# Patient Record
Sex: FEMALE | Race: WHITE | NOT HISPANIC OR LATINO | Employment: FULL TIME | URBAN - METROPOLITAN AREA
[De-identification: names, ages, dates, MRNs, and addresses within clinical notes are randomized per-mention and may not be internally consistent; named-entity substitution may affect disease eponyms.]

---

## 2017-02-19 ENCOUNTER — PATIENT MESSAGE (OUTPATIENT)
Dept: FAMILY MEDICINE | Facility: CLINIC | Age: 57
End: 2017-02-19

## 2017-02-20 RX ORDER — BUTALBITAL, ACETAMINOPHEN AND CAFFEINE 50; 325; 40 MG/1; MG/1; MG/1
1 TABLET ORAL EVERY 4 HOURS PRN
Qty: 30 TABLET | Refills: 3 | Status: SHIPPED | OUTPATIENT
Start: 2017-02-20 | End: 2017-03-22

## 2017-06-28 ENCOUNTER — TELEPHONE (OUTPATIENT)
Dept: FAMILY MEDICINE | Facility: CLINIC | Age: 57
End: 2017-06-28

## 2017-06-28 DIAGNOSIS — R30.0 DYSURIA: Primary | ICD-10-CM

## 2017-06-28 RX ORDER — CIPROFLOXACIN 250 MG/1
250 TABLET, FILM COATED ORAL 2 TIMES DAILY
Qty: 10 TABLET | Refills: 0 | Status: SHIPPED | OUTPATIENT
Start: 2017-06-28 | End: 2017-09-08

## 2017-06-28 RX ORDER — CIPROFLOXACIN 250 MG/1
250 TABLET, FILM COATED ORAL 2 TIMES DAILY
Qty: 10 TABLET | Refills: 0 | Status: SHIPPED | OUTPATIENT
Start: 2017-06-28 | End: 2017-06-28 | Stop reason: SDUPTHER

## 2017-06-29 ENCOUNTER — LAB VISIT (OUTPATIENT)
Dept: LAB | Facility: HOSPITAL | Age: 57
End: 2017-06-29
Attending: FAMILY MEDICINE
Payer: COMMERCIAL

## 2017-06-29 DIAGNOSIS — R30.0 DYSURIA: ICD-10-CM

## 2017-06-29 LAB
BACTERIA #/AREA URNS HPF: ABNORMAL /HPF
BILIRUB UR QL STRIP: NEGATIVE
CLARITY UR: ABNORMAL
COLOR UR: YELLOW
GLUCOSE UR QL STRIP: NEGATIVE
HGB UR QL STRIP: ABNORMAL
KETONES UR QL STRIP: NEGATIVE
LEUKOCYTE ESTERASE UR QL STRIP: ABNORMAL
MICROSCOPIC COMMENT: ABNORMAL
NITRITE UR QL STRIP: NEGATIVE
PH UR STRIP: 7 [PH] (ref 5–8)
PROT UR QL STRIP: NEGATIVE
RBC #/AREA URNS HPF: 0 /HPF (ref 0–4)
SP GR UR STRIP: <=1.005 (ref 1–1.03)
SQUAMOUS #/AREA URNS HPF: 3 /HPF
URN SPEC COLLECT METH UR: ABNORMAL
UROBILINOGEN UR STRIP-ACNC: NEGATIVE EU/DL
WBC #/AREA URNS HPF: 39 /HPF (ref 0–5)
WBC CLUMPS URNS QL MICRO: ABNORMAL

## 2017-06-29 PROCEDURE — 81000 URINALYSIS NONAUTO W/SCOPE: CPT

## 2017-06-29 PROCEDURE — 87086 URINE CULTURE/COLONY COUNT: CPT

## 2017-06-30 ENCOUNTER — PATIENT MESSAGE (OUTPATIENT)
Dept: FAMILY MEDICINE | Facility: CLINIC | Age: 57
End: 2017-06-30

## 2017-06-30 LAB
BACTERIA UR CULT: NORMAL
BACTERIA UR CULT: NORMAL

## 2017-07-24 ENCOUNTER — PATIENT MESSAGE (OUTPATIENT)
Dept: FAMILY MEDICINE | Facility: CLINIC | Age: 57
End: 2017-07-24

## 2017-07-24 DIAGNOSIS — Z12.31 ENCOUNTER FOR SCREENING MAMMOGRAM FOR BREAST CANCER: Primary | ICD-10-CM

## 2017-08-03 ENCOUNTER — HOSPITAL ENCOUNTER (OUTPATIENT)
Dept: RADIOLOGY | Facility: HOSPITAL | Age: 57
Discharge: HOME | End: 2017-08-03
Attending: FAMILY MEDICINE
Payer: COMMERCIAL

## 2017-08-03 VITALS — BODY MASS INDEX: 32.57 KG/M2 | HEIGHT: 62 IN | WEIGHT: 177 LBS

## 2017-08-03 DIAGNOSIS — Z12.31 ENCOUNTER FOR SCREENING MAMMOGRAM FOR BREAST CANCER: ICD-10-CM

## 2017-08-03 PROCEDURE — 77067 SCR MAMMO BI INCL CAD: CPT | Mod: TC

## 2017-08-03 PROCEDURE — 77067 SCR MAMMO BI INCL CAD: CPT | Mod: 26,,, | Performed by: RADIOLOGY

## 2017-08-03 PROCEDURE — 77063 BREAST TOMOSYNTHESIS BI: CPT | Mod: 26,,, | Performed by: RADIOLOGY

## 2017-09-07 ENCOUNTER — TELEPHONE (OUTPATIENT)
Dept: FAMILY MEDICINE | Facility: CLINIC | Age: 57
End: 2017-09-07

## 2017-09-07 NOTE — TELEPHONE ENCOUNTER
----- Message from Lourdes Saul sent at 9/7/2017  3:43 PM CDT -----  Contact: Self/ [phone]  Patient is returning your call.  Please advise.

## 2017-09-07 NOTE — TELEPHONE ENCOUNTER
----- Message from Allison Almonte sent at 9/7/2017  3:28 PM CDT -----  Contact: [phone]/self  Pt requesting to speak with the nurse about scheduling a PHYSICAL before the next available appointment.  Please call and advise

## 2017-09-08 ENCOUNTER — OFFICE VISIT (OUTPATIENT)
Dept: INTERNAL MEDICINE | Facility: CLINIC | Age: 57
End: 2017-09-08
Payer: COMMERCIAL

## 2017-09-08 VITALS
HEIGHT: 62 IN | DIASTOLIC BLOOD PRESSURE: 62 MMHG | BODY MASS INDEX: 31.16 KG/M2 | RESPIRATION RATE: 16 BRPM | HEART RATE: 99 BPM | WEIGHT: 169.31 LBS | TEMPERATURE: 99 F | SYSTOLIC BLOOD PRESSURE: 97 MMHG

## 2017-09-08 DIAGNOSIS — R35.0 URINARY FREQUENCY: ICD-10-CM

## 2017-09-08 DIAGNOSIS — Z11.59 NEED FOR HEPATITIS C SCREENING TEST: ICD-10-CM

## 2017-09-08 DIAGNOSIS — E78.5 HYPERLIPIDEMIA, UNSPECIFIED HYPERLIPIDEMIA TYPE: ICD-10-CM

## 2017-09-08 DIAGNOSIS — E66.9 OBESITY (BMI 30.0-34.9): ICD-10-CM

## 2017-09-08 DIAGNOSIS — I10 BENIGN ESSENTIAL HYPERTENSION: ICD-10-CM

## 2017-09-08 DIAGNOSIS — G43.109 MIGRAINE WITH AURA AND WITHOUT STATUS MIGRAINOSUS, NOT INTRACTABLE: ICD-10-CM

## 2017-09-08 DIAGNOSIS — Z00.00 ANNUAL PHYSICAL EXAM: Primary | ICD-10-CM

## 2017-09-08 PROCEDURE — 99396 PREV VISIT EST AGE 40-64: CPT | Mod: S$GLB,,, | Performed by: INTERNAL MEDICINE

## 2017-09-08 PROCEDURE — 99999 PR PBB SHADOW E&M-EST. PATIENT-LVL III: CPT | Mod: PBBFAC,,, | Performed by: INTERNAL MEDICINE

## 2017-09-08 NOTE — PROGRESS NOTES
Subjective:      Esther Adams is a 57 y.o. female who presents for annual exam.    Family History:  family history includes Breast cancer in her mother; Glaucoma in her mother; Heart disease in her mother; Hypertension in her mother; Migraines in her father and sister; Ovarian cancer (age of onset: 61) in her other; Ovarian cancer (age of onset: 65) in her maternal grandmother; Pancreatitis in her father; Schizophrenia in her father; Stroke in her father and mother; Thyroid disease in her mother.    Health Maintenance:  Health Maintenance       Date Due Completion Date    Hepatitis C Screening 1960 ---    Influenza Vaccine 08/01/2017 10/14/2015 (Done)    Override on 10/14/2015: Done (at work)    Mammogram 08/03/2019 8/3/2017    Colonoscopy 07/30/2020 7/30/2010    Lipid Panel 09/08/2021 9/8/2016    TETANUS VACCINE 09/07/2026 9/7/2016        Eye exam: glasses, exam due  Dental Exam: up to date     Diet: minimal fast food, eats more salads  Body mass index is 30.97 kg/m².      Meds:   Current Outpatient Prescriptions:     amitriptyline (ELAVIL) 25 MG tablet, TAKE THREE TO FOUR TABLETS BY MOUTH ONCE DAILY IN THE EVENING, Disp: 180 tablet, Rfl: 4    aspirin (ECOTRIN) 81 MG EC tablet, Take 1 tablet (81 mg total) by mouth once daily. 1 Tablet, Delayed Release (E.C.) Oral Every day, Disp: 90 tablet, Rfl: 0    butalbital-acetaminophen-caffeine -40 mg (FIORICET, ESGIC) -40 mg per tablet, TAKE ONE TABLET BY MOUTH EVERY 4 HOURS AS NEEDED FOR HEADACHE, Disp: 60 tablet, Rfl: 2    estradiol (ESTRACE) 0.01 % (0.1 mg/gram) vaginal cream, Place 1 g vaginally twice a week. Use cream nightly for very 1st 2 weeks, Disp: 8 g, Rfl: 11    hydrochlorothiazide (HYDRODIURIL) 25 MG tablet, Take 1 tablet (25 mg total) by mouth once daily., Disp: 90 tablet, Rfl: 3    lisinopril (PRINIVIL,ZESTRIL) 20 MG tablet, Take 1 tablet (20 mg total) by mouth once daily., Disp: 90 tablet, Rfl: 3    potassium chloride (KLOR-CON)  10 MEQ TbSR, Take 1 tablet (10 mEq total) by mouth once daily. 1 Tablet Sustained Release Oral Every day, Disp: 90 tablet, Rfl: 3    simvastatin (ZOCOR) 20 MG tablet, Take 1 tablet (20 mg total) by mouth once daily., Disp: 90 tablet, Rfl: 3    PMHx:   Past Medical History:   Diagnosis Date    Allergy     Benign essential hypertension 2012    Hyperlipidemia 2016    Migraine syndrome        PSHx:  Past Surgical History:   Procedure Laterality Date    BILATERAL OOPHORECTOMY       SECTION      HYSTERECTOMY      endometriosis    OOPHORECTOMY         SocHx:   Social History     Social History    Marital status:      Spouse name: N/A    Number of children: N/A    Years of education: N/A     Social History Main Topics    Smoking status: Never Smoker    Smokeless tobacco: None    Alcohol use No      Comment: couple of glasses per week    Drug use: No    Sexual activity: Yes     Partners: Male      Comment:      Other Topics Concern    None     Social History Narrative    None       Review of Systems   Constitutional: Negative for activity change, chills, fatigue, fever and unexpected weight change.   HENT: Positive for postnasal drip. Negative for congestion, ear discharge, ear pain, hearing loss, mouth sores, rhinorrhea, sinus pressure and trouble swallowing.    Eyes: Negative for discharge and visual disturbance.   Respiratory: Negative for cough, chest tightness, shortness of breath and wheezing.    Cardiovascular: Negative for chest pain, palpitations and leg swelling.   Gastrointestinal: Negative for abdominal pain, constipation, diarrhea, nausea and vomiting.   Endocrine: Positive for polyuria. Negative for polydipsia and polyphagia.   Genitourinary: Positive for frequency. Negative for difficulty urinating, dysuria, flank pain and hematuria.        Change in smell of urine, reports dyspareunia   Musculoskeletal: Negative for arthralgias, joint swelling, myalgias and  neck pain.   Skin: Negative for rash.        Small mole below left eye   Neurological: Positive for headaches. Negative for dizziness, weakness, light-headedness and numbness.        Migraine headaches, with aura- yawning, uses ibuprofen used to take midrin, last migraine 6 months ago, light and sound sensitivity and nausea   Hematological: Negative for adenopathy.   Psychiatric/Behavioral: Negative for confusion and dysphoric mood. The patient is not nervous/anxious.         Grief and crying spells when thinking about her mother who  almost 1 year ago       Objective:      Physical Exam   Constitutional: She is oriented to person, place, and time. Vital signs are normal. She appears well-developed and well-nourished. No distress.   HENT:   Head: Normocephalic and atraumatic.   Right Ear: Hearing, tympanic membrane, external ear and ear canal normal. Tympanic membrane is not erythematous and not bulging.   Left Ear: Hearing, tympanic membrane, external ear and ear canal normal. Tympanic membrane is not erythematous and not bulging.   Nose: Nose normal.   Mouth/Throat: Uvula is midline, oropharynx is clear and moist and mucous membranes are normal. No oropharyngeal exudate or posterior oropharyngeal erythema.   Eyes: Conjunctivae, EOM and lids are normal. Pupils are equal, round, and reactive to light. No scleral icterus.   Neck: Normal range of motion. Neck supple. No thyroid mass and no thyromegaly present.   Cardiovascular: Normal rate, regular rhythm, normal heart sounds and intact distal pulses.    No murmur heard.  Pulmonary/Chest: Effort normal and breath sounds normal. She has no wheezes.   Abdominal: Soft. Bowel sounds are normal. She exhibits no distension. There is no hepatosplenomegaly. There is no tenderness. There is no rigidity, no rebound and no guarding.   Musculoskeletal: Normal range of motion. She exhibits no edema.   Lymphadenopathy:     She has no cervical adenopathy.        Right: No  supraclavicular adenopathy present.        Left: No supraclavicular adenopathy present.   Neurological: She is alert and oriented to person, place, and time. She has normal reflexes. No sensory deficit. Coordination and gait normal.   Skin: Skin is warm, dry and intact. Lesion (fleshy mole left lower eye near lower lash line) noted. No rash noted. She is not diaphoretic.   Psychiatric: She has a normal mood and affect.   Vitals reviewed.      Assessment:       1. Annual physical exam    2. Benign essential hypertension    3. Migraine with aura and without status migrainosus, not intractable    4. Hyperlipidemia, unspecified hyperlipidemia type    5. Obesity (BMI 30.0-34.9)    6. Urinary frequency    7. Need for hepatitis C screening test        Plan:       1,7. Ordered CBC, CMP, TSH, screening hepatitis C, lipid panel and U/A.   2. BP is well-controlled, continue HCTZ and lisinopril  3. Well-controlled with elavil as prophylaxis, uses Fioricet as needed, last HA ~1 month ago  4. Tolerates Zocor, check lipid panel, cmp, continue Zocor  5. Increase physical activity, continue healthy diet, weight has decreased from 177 to 169 lbs since last year.  6. Urinalysis and culture, will treat if positive    RTC for follow-up with PCP    Deisy Polanco MD        Answers for HPI/ROS submitted by the patient on 9/7/2017   activity change: No  unexpected weight change: No  neck pain: No  hearing loss: No  rhinorrhea: No  trouble swallowing: No  eye discharge: No  visual disturbance: No  chest tightness: No  wheezing: No  chest pain: No  palpitations: No  blood in stool: No  constipation: No  vomiting: No  diarrhea: No  polydipsia: No  polyuria: Yes  difficulty urinating: No  hematuria: No  menstrual problem: No  dysuria: No  joint swelling: No  arthralgias: No  headaches: Yes  weakness: No  confusion: No  dysphoric mood: No

## 2017-09-11 ENCOUNTER — LAB VISIT (OUTPATIENT)
Dept: LAB | Facility: HOSPITAL | Age: 57
End: 2017-09-11
Attending: INTERNAL MEDICINE
Payer: COMMERCIAL

## 2017-09-11 DIAGNOSIS — Z00.00 ANNUAL PHYSICAL EXAM: ICD-10-CM

## 2017-09-11 DIAGNOSIS — Z11.59 NEED FOR HEPATITIS C SCREENING TEST: ICD-10-CM

## 2017-09-11 LAB
25(OH)D3+25(OH)D2 SERPL-MCNC: 20 NG/ML
ALBUMIN SERPL BCP-MCNC: 3.9 G/DL
ALP SERPL-CCNC: 85 U/L
ALT SERPL W/O P-5'-P-CCNC: 19 U/L
ANION GAP SERPL CALC-SCNC: 11 MMOL/L
AST SERPL-CCNC: 20 U/L
BASOPHILS # BLD AUTO: 0.05 K/UL
BASOPHILS NFR BLD: 0.5 %
BILIRUB SERPL-MCNC: 0.4 MG/DL
BUN SERPL-MCNC: 13 MG/DL
CALCIUM SERPL-MCNC: 9.2 MG/DL
CHLORIDE SERPL-SCNC: 104 MMOL/L
CHOLEST SERPL-MCNC: 149 MG/DL
CHOLEST/HDLC SERPL: 2.9 {RATIO}
CO2 SERPL-SCNC: 28 MMOL/L
CREAT SERPL-MCNC: 1 MG/DL
DIFFERENTIAL METHOD: ABNORMAL
EOSINOPHIL # BLD AUTO: 0.2 K/UL
EOSINOPHIL NFR BLD: 1.7 %
ERYTHROCYTE [DISTWIDTH] IN BLOOD BY AUTOMATED COUNT: 13.1 %
EST. GFR  (AFRICAN AMERICAN): >60 ML/MIN/1.73 M^2
EST. GFR  (NON AFRICAN AMERICAN): >60 ML/MIN/1.73 M^2
ESTIMATED AVG GLUCOSE: 111 MG/DL
GLUCOSE SERPL-MCNC: 112 MG/DL
HBA1C MFR BLD HPLC: 5.5 %
HCT VFR BLD AUTO: 38.9 %
HCV AB SERPL QL IA: POSITIVE
HDLC SERPL-MCNC: 52 MG/DL
HDLC SERPL: 34.9 %
HGB BLD-MCNC: 12.8 G/DL
LDLC SERPL CALC-MCNC: 78.4 MG/DL
LYMPHOCYTES # BLD AUTO: 3.4 K/UL
LYMPHOCYTES NFR BLD: 34.1 %
MCH RBC QN AUTO: 29.8 PG
MCHC RBC AUTO-ENTMCNC: 32.9 G/DL
MCV RBC AUTO: 91 FL
MONOCYTES # BLD AUTO: 0.4 K/UL
MONOCYTES NFR BLD: 3.8 %
NEUTROPHILS # BLD AUTO: 6 K/UL
NEUTROPHILS NFR BLD: 59.8 %
NONHDLC SERPL-MCNC: 97 MG/DL
PLATELET # BLD AUTO: 316 K/UL
PMV BLD AUTO: 9.6 FL
POTASSIUM SERPL-SCNC: 3.7 MMOL/L
PROT SERPL-MCNC: 7.7 G/DL
RBC # BLD AUTO: 4.3 M/UL
SODIUM SERPL-SCNC: 143 MMOL/L
TRIGL SERPL-MCNC: 93 MG/DL
TSH SERPL DL<=0.005 MIU/L-ACNC: 1.54 UIU/ML
WBC # BLD AUTO: 9.96 K/UL

## 2017-09-11 PROCEDURE — 82306 VITAMIN D 25 HYDROXY: CPT

## 2017-09-11 PROCEDURE — 36415 COLL VENOUS BLD VENIPUNCTURE: CPT

## 2017-09-11 PROCEDURE — 80061 LIPID PANEL: CPT

## 2017-09-11 PROCEDURE — 83036 HEMOGLOBIN GLYCOSYLATED A1C: CPT

## 2017-09-11 PROCEDURE — 86803 HEPATITIS C AB TEST: CPT

## 2017-09-11 PROCEDURE — 80053 COMPREHEN METABOLIC PANEL: CPT

## 2017-09-11 PROCEDURE — 85025 COMPLETE CBC W/AUTO DIFF WBC: CPT

## 2017-09-11 PROCEDURE — 84443 ASSAY THYROID STIM HORMONE: CPT

## 2017-09-22 ENCOUNTER — PATIENT MESSAGE (OUTPATIENT)
Dept: INTERNAL MEDICINE | Facility: CLINIC | Age: 57
End: 2017-09-22

## 2017-09-23 DIAGNOSIS — R76.8 POSITIVE HEPATITIS C ANTIBODY TEST: Primary | ICD-10-CM

## 2017-09-23 PROBLEM — E55.9 VITAMIN D INSUFFICIENCY: Status: ACTIVE | Noted: 2017-09-23

## 2017-10-04 ENCOUNTER — LAB VISIT (OUTPATIENT)
Dept: LAB | Facility: HOSPITAL | Age: 57
End: 2017-10-04
Attending: INTERNAL MEDICINE
Payer: COMMERCIAL

## 2017-10-04 ENCOUNTER — TELEPHONE (OUTPATIENT)
Dept: INTERNAL MEDICINE | Facility: CLINIC | Age: 57
End: 2017-10-04

## 2017-10-04 DIAGNOSIS — R76.8 POSITIVE HEPATITIS C ANTIBODY TEST: ICD-10-CM

## 2017-10-04 PROCEDURE — 36415 COLL VENOUS BLD VENIPUNCTURE: CPT | Mod: PO

## 2017-10-04 PROCEDURE — 87522 HEPATITIS C REVRS TRNSCRPJ: CPT

## 2017-10-04 NOTE — TELEPHONE ENCOUNTER
vm left for patient to return call to discuss if she has or is having any burning, pain with urination, frequency or foul smelling urine. Informed Dr Grossman was reviewing lab results and wanted to follow up on results previously viewed by DR Polanco. Instructed to call at [phone] to discuss.

## 2017-10-04 NOTE — TELEPHONE ENCOUNTER
----- Message from Desirae Johnson MD sent at 10/4/2017 11:27 AM CDT -----  Please call pt and see if she is having any UTI sx  At this time  ( she had bacteria that usually does not cause sx )  May say covering MD reviewing results since Dr. SANDIE mitchell

## 2017-10-05 ENCOUNTER — PATIENT MESSAGE (OUTPATIENT)
Dept: INTERNAL MEDICINE | Facility: CLINIC | Age: 57
End: 2017-10-05

## 2017-10-05 LAB
HCV LOG: <1.08 LOG (10) IU/ML
HCV RNA QUANT PCR: <12 IU/ML
HCV, QUALITATIVE: NOT DETECTED IU/ML

## 2017-10-05 RX ORDER — AMOXICILLIN AND CLAVULANATE POTASSIUM 875; 125 MG/1; MG/1
1 TABLET, FILM COATED ORAL 2 TIMES DAILY
Qty: 14 TABLET | Refills: 0 | Status: SHIPPED | OUTPATIENT
Start: 2017-10-05 | End: 2017-10-12

## 2017-10-05 NOTE — TELEPHONE ENCOUNTER
Labs addressed by Dr Grossman, spoke with patient today, patient reports she is having burning, foul odor with urination and frequency. Would like to discuss treatment as per Dr Grossman's notes. Message forwarded.

## 2017-10-05 NOTE — TELEPHONE ENCOUNTER
Since she is symptomatic, would recommend treating with augmentin. This is a penicillin based abx. What pharmacy does she prefer?

## 2017-10-09 DIAGNOSIS — E78.5 HYPERLIPIDEMIA, UNSPECIFIED HYPERLIPIDEMIA TYPE: ICD-10-CM

## 2017-10-09 RX ORDER — SIMVASTATIN 20 MG/1
TABLET, FILM COATED ORAL
Qty: 90 TABLET | Refills: 5 | Status: SHIPPED | OUTPATIENT
Start: 2017-10-09 | End: 2017-11-14 | Stop reason: SDUPTHER

## 2017-10-12 ENCOUNTER — TELEPHONE (OUTPATIENT)
Dept: INTERNAL MEDICINE | Facility: CLINIC | Age: 57
End: 2017-10-12

## 2017-10-12 DIAGNOSIS — R76.8 HEPATITIS C ANTIBODY TEST POSITIVE: Primary | ICD-10-CM

## 2017-10-15 ENCOUNTER — DOCUMENTATION ONLY (OUTPATIENT)
Dept: TRANSPLANT | Facility: CLINIC | Age: 57
End: 2017-10-15

## 2017-10-15 NOTE — LETTER
October 15, 2017    Esther Adams      Dear Dr. Adams    Patient: Esther Adams   MR Number: [id number]   YOB: 1960     Thank you for the referral of Esther Adams to the [facility] program. An initial appointment will be scheduled for your patient with one of our Hepatologists.      Thank you again for your trust in our program.  If there is anything we can do for you or your staff, please feel free to contact us.        Sincerely,        [facility]  [address] [place] [id number]  [phone]

## 2017-10-15 NOTE — LETTER
October 15, 2017    Esther Adams  [address]  [place] [address]      Dear Esther Adams:    Your doctor has referred you to the [facility]. You will be contacted by our office and an initial appointment will then be scheduled for you.    We look forward to seeing you soon. If you have any further questions, please contact us at [phone].       Sincerely,        [facility]   [address] [place] [id number]  [phone]

## 2017-10-16 NOTE — NURSING
Pt records reviewed.   Pt will be referred to Hepatitis C clinic    Initial referral received  from the workque.   Referring Provider/diagnosis  FARRAH WELCH Provider:   Diagnosis: Hepatitis C antibody test positive         Referral letter sent to provider and patient.

## 2017-11-14 DIAGNOSIS — I10 BENIGN ESSENTIAL HYPERTENSION: ICD-10-CM

## 2017-11-14 DIAGNOSIS — G43.909 MIGRAINE SYNDROME: ICD-10-CM

## 2017-11-14 DIAGNOSIS — E78.5 HYPERLIPIDEMIA, UNSPECIFIED HYPERLIPIDEMIA TYPE: ICD-10-CM

## 2017-11-14 DIAGNOSIS — E87.6 HYPOKALEMIA: ICD-10-CM

## 2017-11-14 RX ORDER — LISINOPRIL 20 MG/1
20 TABLET ORAL DAILY
Qty: 90 TABLET | Refills: 4 | Status: SHIPPED | OUTPATIENT
Start: 2017-11-14 | End: 2018-09-21 | Stop reason: SDUPTHER

## 2017-11-14 RX ORDER — POTASSIUM CHLORIDE 750 MG/1
10 TABLET, EXTENDED RELEASE ORAL DAILY
Qty: 90 TABLET | Refills: 4 | Status: SHIPPED | OUTPATIENT
Start: 2017-11-14 | End: 2018-11-26

## 2017-11-14 RX ORDER — AMITRIPTYLINE HYDROCHLORIDE 25 MG/1
TABLET, FILM COATED ORAL
Qty: 180 TABLET | Refills: 11 | Status: SHIPPED | OUTPATIENT
Start: 2017-11-14 | End: 2018-09-21 | Stop reason: DRUGHIGH

## 2017-11-14 RX ORDER — HYDROCHLOROTHIAZIDE 25 MG/1
25 TABLET ORAL DAILY
Qty: 90 TABLET | Refills: 4 | Status: SHIPPED | OUTPATIENT
Start: 2017-11-14 | End: 2018-09-21 | Stop reason: SDUPTHER

## 2017-11-14 RX ORDER — SIMVASTATIN 20 MG/1
20 TABLET, FILM COATED ORAL DAILY
Qty: 90 TABLET | Refills: 5 | Status: SHIPPED | OUTPATIENT
Start: 2017-12-18 | End: 2018-09-21 | Stop reason: SDUPTHER

## 2017-11-14 NOTE — TELEPHONE ENCOUNTER
Estherfannie Ortizorly would like a refill of the following medications:         potassium chloride (KLOR-CON) 10 MEQ TbSR [Shanta Perez MD]         lisinopril (PRINIVIL,ZESTRIL) 20 MG tablet [Shanta Perez MD]         hydrochlorothiazide (HYDRODIURIL) 25 MG tablet [Shanta Perez MD]         amitriptyline (ELAVIL) 25 MG tablet [Shanta Perez MD]         simvastatin (ZOCOR) 20 MG tablet [Shanta Perez MD]     Preferred pharmacy: [facility] [place] - [address] [facility]

## 2017-12-19 ENCOUNTER — PATIENT MESSAGE (OUTPATIENT)
Dept: FAMILY MEDICINE | Facility: CLINIC | Age: 57
End: 2017-12-19

## 2017-12-19 ENCOUNTER — HOSPITAL ENCOUNTER (EMERGENCY)
Facility: HOSPITAL | Age: 57
Discharge: HOME | End: 2017-12-19
Attending: EMERGENCY MEDICINE
Payer: COMMERCIAL

## 2017-12-19 VITALS
SYSTOLIC BLOOD PRESSURE: 129 MMHG | WEIGHT: 166 LBS | TEMPERATURE: 98 F | RESPIRATION RATE: 16 BRPM | HEIGHT: 62 IN | DIASTOLIC BLOOD PRESSURE: 61 MMHG | HEART RATE: 102 BPM | BODY MASS INDEX: 30.55 KG/M2 | OXYGEN SATURATION: 97 %

## 2017-12-19 DIAGNOSIS — K52.9 COLITIS, ACUTE: Primary | ICD-10-CM

## 2017-12-19 DIAGNOSIS — R10.84 GENERALIZED ABDOMINAL PAIN: ICD-10-CM

## 2017-12-19 DIAGNOSIS — R11.2 NAUSEA, VOMITING, AND DIARRHEA: ICD-10-CM

## 2017-12-19 DIAGNOSIS — R19.7 NAUSEA, VOMITING, AND DIARRHEA: ICD-10-CM

## 2017-12-19 LAB
ABO + RH BLD: NORMAL
ALBUMIN SERPL BCP-MCNC: 4.2 G/DL
ALP SERPL-CCNC: 90 U/L
ALT SERPL W/O P-5'-P-CCNC: 25 U/L
ANION GAP SERPL CALC-SCNC: 12 MMOL/L
APTT BLDCRRT: 24.9 SEC
AST SERPL-CCNC: 21 U/L
BASOPHILS # BLD AUTO: 0.04 K/UL
BASOPHILS NFR BLD: 0.3 %
BILIRUB SERPL-MCNC: 0.4 MG/DL
BLD GP AB SCN CELLS X3 SERPL QL: NORMAL
BUN SERPL-MCNC: 13 MG/DL
CALCIUM SERPL-MCNC: 9.7 MG/DL
CHLORIDE SERPL-SCNC: 102 MMOL/L
CO2 SERPL-SCNC: 24 MMOL/L
CREAT SERPL-MCNC: 1 MG/DL
DIFFERENTIAL METHOD: ABNORMAL
EOSINOPHIL # BLD AUTO: 0 K/UL
EOSINOPHIL NFR BLD: 0.1 %
ERYTHROCYTE [DISTWIDTH] IN BLOOD BY AUTOMATED COUNT: 12.9 %
EST. GFR  (AFRICAN AMERICAN): >60 ML/MIN/1.73 M^2
EST. GFR  (NON AFRICAN AMERICAN): >60 ML/MIN/1.73 M^2
GLUCOSE SERPL-MCNC: 142 MG/DL
HCT VFR BLD AUTO: 41.3 %
HGB BLD-MCNC: 13.8 G/DL
INR PPP: 0.9
LYMPHOCYTES # BLD AUTO: 2 K/UL
LYMPHOCYTES NFR BLD: 13.1 %
MCH RBC QN AUTO: 29.6 PG
MCHC RBC AUTO-ENTMCNC: 33.4 G/DL
MCV RBC AUTO: 88 FL
MONOCYTES # BLD AUTO: 0.7 K/UL
MONOCYTES NFR BLD: 4.8 %
NEUTROPHILS # BLD AUTO: 12.5 K/UL
NEUTROPHILS NFR BLD: 81.6 %
PLATELET # BLD AUTO: 350 K/UL
PMV BLD AUTO: 9.8 FL
POTASSIUM SERPL-SCNC: 3.6 MMOL/L
PROT SERPL-MCNC: 8.1 G/DL
PROTHROMBIN TIME: 9.8 SEC
RBC # BLD AUTO: 4.67 M/UL
SODIUM SERPL-SCNC: 138 MMOL/L
WBC # BLD AUTO: 15.27 K/UL

## 2017-12-19 PROCEDURE — 96375 TX/PRO/DX INJ NEW DRUG ADDON: CPT

## 2017-12-19 PROCEDURE — 99284 EMERGENCY DEPT VISIT MOD MDM: CPT | Mod: 25

## 2017-12-19 PROCEDURE — 96372 THER/PROPH/DIAG INJ SC/IM: CPT

## 2017-12-19 PROCEDURE — 96361 HYDRATE IV INFUSION ADD-ON: CPT

## 2017-12-19 PROCEDURE — 85610 PROTHROMBIN TIME: CPT

## 2017-12-19 PROCEDURE — 80053 COMPREHEN METABOLIC PANEL: CPT

## 2017-12-19 PROCEDURE — 25500020 PHARM REV CODE 255: Performed by: EMERGENCY MEDICINE

## 2017-12-19 PROCEDURE — 63600175 PHARM REV CODE 636 W HCPCS: Performed by: EMERGENCY MEDICINE

## 2017-12-19 PROCEDURE — 85730 THROMBOPLASTIN TIME PARTIAL: CPT

## 2017-12-19 PROCEDURE — 96374 THER/PROPH/DIAG INJ IV PUSH: CPT

## 2017-12-19 PROCEDURE — 85025 COMPLETE CBC W/AUTO DIFF WBC: CPT

## 2017-12-19 PROCEDURE — 25000003 PHARM REV CODE 250: Performed by: EMERGENCY MEDICINE

## 2017-12-19 PROCEDURE — 86850 RBC ANTIBODY SCREEN: CPT

## 2017-12-19 PROCEDURE — 86900 BLOOD TYPING SEROLOGIC ABO: CPT

## 2017-12-19 RX ORDER — KETOROLAC TROMETHAMINE 10 MG/1
10 TABLET, FILM COATED ORAL 3 TIMES DAILY PRN
Qty: 15 TABLET | Refills: 0 | Status: SHIPPED | OUTPATIENT
Start: 2017-12-19 | End: 2018-01-12

## 2017-12-19 RX ORDER — ONDANSETRON 2 MG/ML
8 INJECTION INTRAMUSCULAR; INTRAVENOUS
Status: COMPLETED | OUTPATIENT
Start: 2017-12-19 | End: 2017-12-19

## 2017-12-19 RX ORDER — CIPROFLOXACIN 500 MG/1
500 TABLET ORAL 2 TIMES DAILY
Qty: 14 TABLET | Refills: 0 | Status: SHIPPED | OUTPATIENT
Start: 2017-12-19 | End: 2017-12-26

## 2017-12-19 RX ORDER — METRONIDAZOLE 500 MG/1
500 TABLET ORAL
Status: COMPLETED | OUTPATIENT
Start: 2017-12-19 | End: 2017-12-19

## 2017-12-19 RX ORDER — ONDANSETRON 8 MG/1
8 TABLET, ORALLY DISINTEGRATING ORAL EVERY 8 HOURS PRN
Qty: 30 TABLET | Refills: 0 | Status: SHIPPED | OUTPATIENT
Start: 2017-12-19 | End: 2018-01-12

## 2017-12-19 RX ORDER — CIPROFLOXACIN 500 MG/1
500 TABLET ORAL
Status: COMPLETED | OUTPATIENT
Start: 2017-12-19 | End: 2017-12-19

## 2017-12-19 RX ORDER — METRONIDAZOLE 500 MG/1
500 TABLET ORAL 3 TIMES DAILY
Qty: 21 TABLET | Refills: 0 | Status: SHIPPED | OUTPATIENT
Start: 2017-12-19 | End: 2017-12-26

## 2017-12-19 RX ORDER — DICYCLOMINE HYDROCHLORIDE 10 MG/ML
20 INJECTION INTRAMUSCULAR
Status: COMPLETED | OUTPATIENT
Start: 2017-12-19 | End: 2017-12-19

## 2017-12-19 RX ORDER — DICYCLOMINE HYDROCHLORIDE 10 MG/1
10 CAPSULE ORAL
COMMUNITY
End: 2019-09-16 | Stop reason: SDUPTHER

## 2017-12-19 RX ORDER — KETOROLAC TROMETHAMINE 30 MG/ML
10 INJECTION, SOLUTION INTRAMUSCULAR; INTRAVENOUS
Status: COMPLETED | OUTPATIENT
Start: 2017-12-19 | End: 2017-12-19

## 2017-12-19 RX ADMIN — KETOROLAC TROMETHAMINE 10 MG: 30 INJECTION, SOLUTION INTRAMUSCULAR at 03:12

## 2017-12-19 RX ADMIN — CIPROFLOXACIN 500 MG: 500 TABLET, FILM COATED ORAL at 04:12

## 2017-12-19 RX ADMIN — METRONIDAZOLE 500 MG: 500 TABLET ORAL at 04:12

## 2017-12-19 RX ADMIN — IOHEXOL 100 ML: 350 INJECTION, SOLUTION INTRAVENOUS at 04:12

## 2017-12-19 RX ADMIN — ONDANSETRON 8 MG: 2 INJECTION INTRAMUSCULAR; INTRAVENOUS at 02:12

## 2017-12-19 RX ADMIN — SODIUM CHLORIDE 1000 ML: 0.9 INJECTION, SOLUTION INTRAVENOUS at 02:12

## 2017-12-19 RX ADMIN — DICYCLOMINE HYDROCHLORIDE 20 MG: 20 INJECTION, SOLUTION INTRAMUSCULAR at 02:12

## 2017-12-19 NOTE — ED NOTES
Pt updated on plan of care. Pt verbalized understanding of POC. Family member at bedside. Call light within reach. Safety and comfort measures maintained. NAD noted. Will continue to monitor.

## 2017-12-19 NOTE — ED NOTES
"Patient presents to ED secondary to abdominal pain and rectal bleeding. Pt describes pain as "burning" sensation. States has taken two bentyl po prior to arrival with no relief. States had a couple episodes of vomiting. Pain is generalized but worsened in LLQ. Also c/o rectal bleeding. States had two episodes of "slimy bloody stool." Pt appears anxious. Rocking back and forth. Rates pain 7/10. Less than 3 sec cap refill. Appears in moderate distress.  at bedside. Will continue to monitor closely.   "

## 2017-12-19 NOTE — ED PROVIDER NOTES
Encounter Date: 2017       History     Chief Complaint   Patient presents with    Abdominal Pain     reports LLQ abd pain for 6 hours after eating with nausea/vomiting. reports rectal bleeding for 3 hours     Rectal Bleeding     57F with HTN, HLD, and migraine HAs presents with acute onset of abdominal pain with n/v/d.  Symptoms began on the way home from a restaurant.  She initially had cramping pain in her entire abdomen with vomiting and diarrhea.  She reports about 15 episodes of diarrhea.  She began noting BRB in her stools and states her last few stools have been only bloody mucus.  Her cramping pain is localized to her LLQ.  The rest of her abdomen is burning.  She reports 2 episodes of near syncope.  She has no other complaints.  No modifying factors.  Her  ate the exact same meal as her and is fine.          Review of patient's allergies indicates:   Allergen Reactions    Morphine      Other reaction(s): Itching     Past Medical History:   Diagnosis Date    Allergy     Benign essential hypertension 2012    Hyperlipidemia 2016    Migraine syndrome      Past Surgical History:   Procedure Laterality Date    BILATERAL OOPHORECTOMY       SECTION      HYSTERECTOMY      endometriosis    OOPHORECTOMY       Family History   Problem Relation Age of Onset    Breast cancer Mother     Heart disease Mother     Stroke Mother     Hypertension Mother     Glaucoma Mother     Thyroid disease Mother     Stroke Father     Schizophrenia Father     Pancreatitis Father      viral    Migraines Father     Migraines Sister     Ovarian cancer Maternal Grandmother 65    Ovarian cancer Other 61     Social History   Substance Use Topics    Smoking status: Never Smoker    Smokeless tobacco: Never Used    Alcohol use No      Comment: couple of glasses per week     Review of Systems   Constitutional: Negative for fever.   Gastrointestinal: Positive for abdominal pain, blood in stool,  diarrhea, nausea and vomiting.   Neurological: Positive for light-headedness.   All other systems reviewed and are negative.      Physical Exam     Initial Vitals [12/19/17 0213]   BP Pulse Resp Temp SpO2   (!) 163/92 (!) 121 20 98.3 °F (36.8 °C) 100 %      MAP       115.67         Physical Exam    Nursing note and vitals reviewed.  Constitutional: She appears well-developed and well-nourished. No distress.   HENT:   Head: Normocephalic and atraumatic.   Eyes: Conjunctivae are normal.   Neck: Normal range of motion.   Cardiovascular: Tachycardia present.    No murmur heard.  Pulmonary/Chest: Breath sounds normal. She has no wheezes. She has no rhonchi. She has no rales.   Abdominal: Soft. Bowel sounds are normal. She exhibits no distension and no mass. There is generalized tenderness and tenderness in the left lower quadrant. There is no rebound and no guarding.   Musculoskeletal: Normal range of motion.   Neurological: She is alert and oriented to person, place, and time.   Skin: Skin is warm and dry.   Psychiatric: She has a normal mood and affect. Her behavior is normal.         ED Course   Procedures  Labs Reviewed   CBC W/ AUTO DIFFERENTIAL - Abnormal; Notable for the following:        Result Value    WBC 15.27 (*)     Gran # 12.5 (*)     Gran% 81.6 (*)     Lymph% 13.1 (*)     All other components within normal limits   COMPREHENSIVE METABOLIC PANEL - Abnormal; Notable for the following:     Glucose 142 (*)     All other components within normal limits   PROTIME-INR   APTT   TYPE & SCREEN             Medical Decision Making:   Clinical Tests:   Lab Tests: Ordered and Reviewed  Radiological Study: Ordered and Reviewed  ED Management:  Gen abd pain with n/v/d that began acutely this evening after eating.  BRB in stools.  Pt was given zofran and bentyl.  Cramping improved but she continued to have burning pain.  Labs show leukocytosis.  CT shows inflammatory changes of ascending and transverse colon suggestive of  colitis.  Will treat with cipro/flagyl and bentyl/zofran.  Pt was feeling much better at time of discharge.                     ED Course      Clinical Impression:   The primary encounter diagnosis was Colitis, acute. Diagnoses of Generalized abdominal pain and Nausea, vomiting, and diarrhea were also pertinent to this visit.                           Syeda Katz MD  12/19/17 0431

## 2017-12-21 ENCOUNTER — PATIENT MESSAGE (OUTPATIENT)
Dept: FAMILY MEDICINE | Facility: CLINIC | Age: 57
End: 2017-12-21

## 2017-12-26 ENCOUNTER — OFFICE VISIT (OUTPATIENT)
Dept: FAMILY MEDICINE | Facility: CLINIC | Age: 57
End: 2017-12-26
Payer: COMMERCIAL

## 2017-12-26 VITALS
DIASTOLIC BLOOD PRESSURE: 67 MMHG | HEART RATE: 102 BPM | SYSTOLIC BLOOD PRESSURE: 109 MMHG | WEIGHT: 168 LBS | BODY MASS INDEX: 30.73 KG/M2 | OXYGEN SATURATION: 99 % | TEMPERATURE: 100 F

## 2017-12-26 DIAGNOSIS — J06.9 VIRAL UPPER RESPIRATORY TRACT INFECTION: ICD-10-CM

## 2017-12-26 DIAGNOSIS — J02.9 SORE THROAT: ICD-10-CM

## 2017-12-26 DIAGNOSIS — L91.8 INFLAMED SKIN TAG: ICD-10-CM

## 2017-12-26 DIAGNOSIS — K52.9 COLITIS: Primary | ICD-10-CM

## 2017-12-26 PROCEDURE — 11055 PARING/CUTG B9 HYPRKER LES 1: CPT | Mod: S$GLB,,, | Performed by: FAMILY MEDICINE

## 2017-12-26 PROCEDURE — 87081 CULTURE SCREEN ONLY: CPT

## 2017-12-26 PROCEDURE — 99999 PR PBB SHADOW E&M-EST. PATIENT-LVL IV: CPT | Mod: PBBFAC,,, | Performed by: FAMILY MEDICINE

## 2017-12-26 PROCEDURE — 99214 OFFICE O/P EST MOD 30 MIN: CPT | Mod: 25,S$GLB,, | Performed by: FAMILY MEDICINE

## 2017-12-26 NOTE — PROGRESS NOTES
" Office Visit    Patient Name: Esther Adams    : 1960  MRN: [id number]    Subjective:  Esther is a 57 y.o. female who presents today for:    Follow-up (went to ER for collitis. ) and Nasal Congestion (also has sore throat)    57-year-old generally healthy patient of mine with past medical history significant for controlled hypertension here today for ER follow-up for colitis.  She has a past history of "gastrointestinal spasms."  She reports that for several years she has had episodes where she gets a sudden onset of abdominal pain and needs to run to the bathroom and have diarrhea fairly quickly.  In fact, last year she had an episode of severe sudden pain accompanied by sudden severe diarrhea.  These previous episodes, however, have not involved blood in the stool.  Recently on 2017 she began to experience cramping pain in her entire abdominal area that was then associated with severe profuse vomiting and diarrhea.  She reported about 15 episodes of diarrhea and began noticing some bright red blood in her stools.  Upon presenting to the emergency department she reported that she was having stools consisting of only bloody mucus.  Accompanying these episodes she reported feeling like she was going to pass out.  She did not, however, feel ill in any way.  She reported no fevers, chills, preceding symptoms of malaise.  She had eaten at a restaurant just prior to the onset of her symptoms, but she reports that her  ate the same things and did not get sick at all.    Workup in the ER consisted of a CT scan of the abdomen and pelvis that showed acute colitis of the ascending and transverse colon.  Blood work was remarkable for a mild white blood count elevation of 15, but was otherwise unremarkable.  She was given Zofran and Bentyl in the emergency room with some improvement in her symptoms.  She was started on Cipro and Flagyl to complete a 7 day course as an outpatient, and she has one " "remaining day.  She reports that over the last several days her diarrhea has significantly improved.  She is having about 3 mildly loose stools per day, but this is very manageable in comparison to what she was experiencing prior.  Still with some residual abdominal bloating and nausea and  some associated gas and bloating. Adequate PO intake but appetite is decreased. She is not having any bloody stools and did not have any bloody stools after about 24-48 hours of her initial presentation.  Unfortunately, in the last 2 days she has come down with upper respiratory symptoms.  She is now reporting nasal congestion, dry cough, postnasal drainage, sore throat.  She also feels mildly feverish and her temperature today in office is 100.2.  She has a son who was recently diagnosed with strep throat.    Finally, she would like to discuss removal of an inflamed skin tag underneath her left eye. she has had it for years, but in the last few months it is growing and becoming more inflamed and irritated. It bothers her significantly as it is right where her glasses sit.     Past Medical History  Past Medical History:   Diagnosis Date    Allergic rhinitis 2016    trial of daily allegra or any antihistamine- avoid "D" products due to high blood pressure    Benign essential hypertension 2012    Family history of stroke 2016    Hyperlipidemia 2016    Migraine syndrome     Obesity (BMI 30.0-34.9) 2016    Positive hepatitis C antibody test 2017    Vitamin D insufficiency 2017       Past Surgical History  Past Surgical History:   Procedure Laterality Date    BILATERAL OOPHORECTOMY       SECTION      HYSTERECTOMY      endometriosis    OOPHORECTOMY         Family History  Family History   Problem Relation Age of Onset    Breast cancer Mother     Heart disease Mother     Stroke Mother     Hypertension Mother     Glaucoma Mother     Thyroid disease Mother     Stroke Father     " Schizophrenia Father     Pancreatitis Father      viral    Migraines Father     Migraines Sister     Ovarian cancer Maternal Grandmother 65    Ovarian cancer Other 61       Social History  Social History     Social History    Marital status:      Spouse name: N/A    Number of children: N/A    Years of education: N/A     Occupational History    Not on file.     Social History Main Topics    Smoking status: Never Smoker    Smokeless tobacco: Never Used    Alcohol use No      Comment: couple of glasses per week    Drug use: No    Sexual activity: Yes     Partners: Male      Comment:      Other Topics Concern    Not on file     Social History Narrative    No narrative on file       Current Medications  Medications reviewed and updated.     Allergies   Review of patient's allergies indicates:   Allergen Reactions    Morphine      Other reaction(s): Itching       Review of Systems (Pertinent positives)  Review of Systems   Constitutional: Positive for fatigue and fever.   HENT: Positive for congestion, postnasal drip, rhinorrhea and sore throat.    Respiratory: Positive for cough. Negative for shortness of breath.    Gastrointestinal: Positive for abdominal distention, abdominal pain and nausea. Negative for blood in stool (resolved).   Skin: Wound: inflamed skin tag.   Neurological: Positive for light-headedness.       /67 (BP Location: Right arm, Patient Position: Sitting)   Pulse 102   Temp 100.2 °F (37.9 °C) (Oral)   Wt 76.2 kg (167 lb 15.9 oz)   LMP 01/01/2002 (Approximate) Comment: 15-18 yrs ago  SpO2 99%   BMI 30.73 kg/m²     Physical Exam   Constitutional: She is oriented to person, place, and time. She appears well-developed and well-nourished. No distress.   HENT:   Head: Normocephalic and atraumatic.   Right Ear: Tympanic membrane is not erythematous and not bulging.   Left Ear: Tympanic membrane is not erythematous and not bulging.   Nose: Mucosal edema (mild) and  rhinorrhea (mild) present.   Mouth/Throat: No oral lesions. No dental caries. Posterior oropharyngeal erythema present. No oropharyngeal exudate, posterior oropharyngeal edema or tonsillar abscesses.   Eyes: Conjunctivae are normal.   Cardiovascular: Normal rate and regular rhythm.    Pulmonary/Chest: Effort normal and breath sounds normal.   Abdominal: Soft. Bowel sounds are normal. There is generalized tenderness. There is no rebound and no guarding.   Musculoskeletal: She exhibits no edema.   Neurological: She is alert and oriented to person, place, and time.   Skin: Skin is warm and dry. Lesion noted.        Psychiatric: She has a normal mood and affect.   Vitals reviewed.        Assessment/Plan:  Esther Adams is a 57 y.o. female who presents today for :    Esther was seen today for follow-up and nasal congestion.    Diagnoses and all orders for this visit:    Colitis  Comments:  some recent improvement with cipro and flagyl but would like to refer to GI to consider ischemic colitis as a possibility  Orders:  -     Ambulatory referral to Gastroenterology    Viral upper respiratory tract infection    Sore throat  -     Strep A culture, throat    Inflamed skin tag            ICD-10-CM ICD-9-CM    1. Colitis K52.9 558.9 Ambulatory referral to Gastroenterology    some recent improvement with cipro and flagyl but would like to refer to GI to consider ischemic colitis as a possibility   2. Viral upper respiratory tract infection J06.9 465.9     B97.89     3. Sore throat J02.9 462 Strep A culture, throat   4. Inflamed skin tag L91.8 701.9      686.9        There are no Patient Instructions on file for this visit.      Return for will call w/ throat culture results, GI referral to discuss further colitis eval.

## 2017-12-28 ENCOUNTER — TELEPHONE (OUTPATIENT)
Dept: FAMILY MEDICINE | Facility: CLINIC | Age: 57
End: 2017-12-28

## 2017-12-28 LAB — BACTERIA THROAT CULT: NORMAL

## 2017-12-28 NOTE — TELEPHONE ENCOUNTER
Patient returned call regarding throat culture, no step growth.  Patient verbalized understanding.

## 2017-12-28 NOTE — TELEPHONE ENCOUNTER
Called patient to notify that no strep growth on throat culture.  No answer, left voicemail requesting call back.

## 2018-01-12 ENCOUNTER — INITIAL CONSULT (OUTPATIENT)
Dept: GASTROENTEROLOGY | Facility: CLINIC | Age: 58
End: 2018-01-12
Payer: COMMERCIAL

## 2018-01-12 VITALS
BODY MASS INDEX: 31.4 KG/M2 | HEART RATE: 95 BPM | SYSTOLIC BLOOD PRESSURE: 109 MMHG | HEIGHT: 62 IN | WEIGHT: 170.63 LBS | DIASTOLIC BLOOD PRESSURE: 63 MMHG

## 2018-01-12 DIAGNOSIS — K62.5 RECTAL BLEEDING: ICD-10-CM

## 2018-01-12 DIAGNOSIS — R93.3 ABNORMAL CT SCAN, COLON: Primary | ICD-10-CM

## 2018-01-12 DIAGNOSIS — R10.31 ABDOMINAL PAIN, BILATERAL LOWER QUADRANT: ICD-10-CM

## 2018-01-12 DIAGNOSIS — R10.32 ABDOMINAL PAIN, BILATERAL LOWER QUADRANT: ICD-10-CM

## 2018-01-12 DIAGNOSIS — R19.7 DIARRHEA, UNSPECIFIED TYPE: ICD-10-CM

## 2018-01-12 PROCEDURE — 99203 OFFICE O/P NEW LOW 30 MIN: CPT | Mod: S$GLB,,, | Performed by: NURSE PRACTITIONER

## 2018-01-12 PROCEDURE — 99999 PR PBB SHADOW E&M-EST. PATIENT-LVL III: CPT | Mod: PBBFAC,,, | Performed by: NURSE PRACTITIONER

## 2018-01-12 RX ORDER — POLYETHYLENE GLYCOL 3350, SODIUM SULFATE ANHYDROUS, SODIUM BICARBONATE, SODIUM CHLORIDE, POTASSIUM CHLORIDE 236; 22.74; 6.74; 5.86; 2.97 G/4L; G/4L; G/4L; G/4L; G/4L
4 POWDER, FOR SOLUTION ORAL SEE ADMIN INSTRUCTIONS
Qty: 4000 ML | Refills: 0 | Status: ON HOLD | OUTPATIENT
Start: 2018-01-12 | End: 2018-02-07 | Stop reason: HOSPADM

## 2018-01-12 NOTE — PROGRESS NOTES
Subjective:       Patient ID: Esther Adams is a 57 y.o. female.    Chief Complaint: Abdominal Pain; Rectal Bleeding; and Diarrhea (a week)    HPI  In December after eating began to have complaints of abdominal cramping, heart racing, and urge for bowel movement.  She then began having multiple loose stools and then explosive bowel movements.  Pain continued.  She then reports only passing bright red blood.  Denies anal rectal pain.  Reports at least 15 bowel movements.  With continued pain she presented to the ED.  Pain was initially in the lower abdomen bilaterally but persisted in the LLQ.    CT at that time:  1. Long segment of colonic wall thickening and surrounding inflammatory change predominantly involving the upper ascending and transverse colon. Findings likely represent a nonspecific infectious/inflammatory colitis.    2. Small gallstones.    WBC elevated.  She was treated with cipro and flagyl.  She noted blood with bowel movements for two further days.  She continued to have loose stools but with improvement over time and she reports she has returned to a normal bowel pattern currently.    She denies further rectal bleeding.  Denies current abdominal pain.    She has had episodes in the past past of abdominal cramping and explosive diarrhea.  She has used bentyl.  Bentyl was not effective with her current episode.      Denies family history of colon cancer, colon polyps or IBD.  She had colonoscopy in 2010 that was normal.    Review of Systems   Constitutional: Negative.  Negative for activity change, appetite change, fatigue, fever and unexpected weight change.   Respiratory: Negative.  Negative for cough, choking and shortness of breath.    Cardiovascular: Negative.  Negative for chest pain.   Gastrointestinal: Positive for abdominal pain, blood in stool, diarrhea and nausea. Negative for abdominal distention, constipation and rectal pain.   Genitourinary: Negative.  Negative for difficulty  urinating, dysuria and hematuria.   Musculoskeletal: Negative.  Negative for neck pain and neck stiffness.   Skin: Negative.    Neurological: Negative.  Negative for dizziness, syncope, weakness and light-headedness.   Psychiatric/Behavioral: Negative.        Objective:      Physical Exam   Constitutional: She is oriented to person, place, and time. She appears well-developed and well-nourished. No distress.   Eyes: No scleral icterus.   Cardiovascular: Normal rate.    Pulmonary/Chest: Effort normal. No respiratory distress.   Abdominal: Soft. Bowel sounds are normal. She exhibits no distension and no mass. There is no tenderness. There is no guarding.   Musculoskeletal: Normal range of motion.   Neurological: She is alert and oriented to person, place, and time.   Skin: Skin is warm and dry. She is not diaphoretic.   Psychiatric: She has a normal mood and affect. Her behavior is normal. Judgment and thought content normal.   Vitals reviewed.      Assessment:       1. Abnormal CT scan, colon    2. Diarrhea, unspecified type    3. Rectal bleeding    4. Abdominal pain, bilateral lower quadrant        Plan:         Esther was seen today for abdominal pain, rectal bleeding and diarrhea.    Diagnoses and all orders for this visit:    Abnormal CT scan, colon    Diarrhea, unspecified type    Rectal bleeding    Abdominal pain, bilateral lower quadrant         I have explained the planned procedures to the patient.The risks, benefits and alternatives of the procedure were also explained in detail. Patient verbalized understanding, all questions were answered. The patient agrees to proceed as planned    Will schedule colonoscopy for evaluation.  Suspect that this was most likely infectious and symptoms have resolved.  Her last colonoscopy was 8 years ago and will repeat now to rule out any more serious pathology.

## 2018-01-12 NOTE — LETTER
January 12, 2018      Shanta Perez MD  [address]  [address]  [place] [address]           [facility]  [address]  [place] [address]  Phone: [phone]          Patient: Esther Adams   MR Number: [id number]   YOB: 1960   Date of Visit: 1/12/2018       Dear Dr. Shanta Perez:    Thank you for referring Esther Adams to me for evaluation. Attached you will find relevant portions of my assessment and plan of care.    If you have questions, please do not hesitate to call me. I look forward to following Esther Adams along with you.    Sincerely,    Anita Jenkins, ROCHELLE    Enclosure  CC:  No Recipients    If you would like to receive this communication electronically, please contact [email or web address] or [phone] to request more information on [employer] Link access.    For providers and/or their staff who would like to refer a patient to [facility], please contact us through our one-stop-shop provider referral line, [facility], at [phone].    If you feel you have received this communication in error or would no longer like to receive these types of communications, please e-mail [email or web address]

## 2018-02-07 ENCOUNTER — ANESTHESIA EVENT (OUTPATIENT)
Dept: ENDOSCOPY | Facility: HOSPITAL | Age: 58
End: 2018-02-07
Payer: COMMERCIAL

## 2018-02-07 ENCOUNTER — HOSPITAL ENCOUNTER (OUTPATIENT)
Facility: HOSPITAL | Age: 58
Discharge: HOME | End: 2018-02-07
Attending: INTERNAL MEDICINE | Admitting: INTERNAL MEDICINE
Payer: COMMERCIAL

## 2018-02-07 ENCOUNTER — SURGERY (OUTPATIENT)
Age: 58
End: 2018-02-07

## 2018-02-07 ENCOUNTER — ANESTHESIA (OUTPATIENT)
Dept: ENDOSCOPY | Facility: HOSPITAL | Age: 58
End: 2018-02-07
Payer: COMMERCIAL

## 2018-02-07 VITALS
WEIGHT: 170 LBS | BODY MASS INDEX: 31.28 KG/M2 | TEMPERATURE: 99 F | SYSTOLIC BLOOD PRESSURE: 100 MMHG | HEART RATE: 78 BPM | HEIGHT: 62 IN | RESPIRATION RATE: 20 BRPM | OXYGEN SATURATION: 100 % | DIASTOLIC BLOOD PRESSURE: 54 MMHG

## 2018-02-07 DIAGNOSIS — Z12.11 SPECIAL SCREENING FOR MALIGNANT NEOPLASMS, COLON: Primary | ICD-10-CM

## 2018-02-07 DIAGNOSIS — R19.7 ACUTE DIARRHEA: ICD-10-CM

## 2018-02-07 DIAGNOSIS — Z12.11 SCREENING FOR COLON CANCER: ICD-10-CM

## 2018-02-07 PROCEDURE — 63600175 PHARM REV CODE 636 W HCPCS: Performed by: NURSE ANESTHETIST, CERTIFIED REGISTERED

## 2018-02-07 PROCEDURE — 37000008 HC ANESTHESIA 1ST 15 MINUTES: Performed by: INTERNAL MEDICINE

## 2018-02-07 PROCEDURE — 45380 COLONOSCOPY AND BIOPSY: CPT | Performed by: INTERNAL MEDICINE

## 2018-02-07 PROCEDURE — 45380 COLONOSCOPY AND BIOPSY: CPT | Mod: ,,, | Performed by: INTERNAL MEDICINE

## 2018-02-07 PROCEDURE — 37000009 HC ANESTHESIA EA ADD 15 MINS: Performed by: INTERNAL MEDICINE

## 2018-02-07 PROCEDURE — 88305 TISSUE EXAM BY PATHOLOGIST: CPT | Mod: 26,,, | Performed by: PATHOLOGY

## 2018-02-07 PROCEDURE — 27201012 HC FORCEPS, HOT/COLD, DISP: Performed by: INTERNAL MEDICINE

## 2018-02-07 PROCEDURE — 25000003 PHARM REV CODE 250: Performed by: INTERNAL MEDICINE

## 2018-02-07 PROCEDURE — 88305 TISSUE EXAM BY PATHOLOGIST: CPT | Performed by: PATHOLOGY

## 2018-02-07 RX ORDER — PROPOFOL 10 MG/ML
VIAL (ML) INTRAVENOUS CONTINUOUS PRN
Status: DISCONTINUED | OUTPATIENT
Start: 2018-02-07 | End: 2018-02-07

## 2018-02-07 RX ORDER — SODIUM CHLORIDE 9 MG/ML
INJECTION, SOLUTION INTRAVENOUS CONTINUOUS
Status: DISCONTINUED | OUTPATIENT
Start: 2018-02-07 | End: 2018-02-07 | Stop reason: HOSPADM

## 2018-02-07 RX ORDER — LIDOCAINE HCL/PF 100 MG/5ML
SYRINGE (ML) INTRAVENOUS
Status: DISCONTINUED | OUTPATIENT
Start: 2018-02-07 | End: 2018-02-07

## 2018-02-07 RX ORDER — PROPOFOL 10 MG/ML
VIAL (ML) INTRAVENOUS
Status: DISCONTINUED | OUTPATIENT
Start: 2018-02-07 | End: 2018-02-07

## 2018-02-07 RX ADMIN — LIDOCAINE HYDROCHLORIDE 60 MG: 20 INJECTION, SOLUTION INTRAVENOUS at 07:02

## 2018-02-07 RX ADMIN — PROPOFOL 50 MG: 10 INJECTION, EMULSION INTRAVENOUS at 07:02

## 2018-02-07 RX ADMIN — PROPOFOL 150 MCG/KG/MIN: 10 INJECTION, EMULSION INTRAVENOUS at 07:02

## 2018-02-07 RX ADMIN — SODIUM CHLORIDE: 0.9 INJECTION, SOLUTION INTRAVENOUS at 07:02

## 2018-02-07 NOTE — TRANSFER OF CARE
"Anesthesia Transfer of Care Note    Patient: Esther Adams    Procedure(s) Performed: Procedure(s) (LRB):  COLONOSCOPY Golytely (N/A)    Patient location: GI    Anesthesia Type: MAC    Transport from OR: Transported from OR on room air with adequate spontaneous ventilation    Post pain: adequate analgesia    Post assessment: no apparent anesthetic complications and tolerated procedure well    Post vital signs: stable    Level of consciousness: awake, alert and oriented    Nausea/Vomiting: no nausea/vomiting    Complications: none    Transfer of care protocol was followed      Last vitals:   Visit Vitals  /67   Pulse 97   Temp 37 °C (98.6 °F) (Oral)   Resp 18   Ht 5' 2" (1.575 m)   Wt 77.1 kg (170 lb)   LMP 01/01/2002 (Approximate)   SpO2 99%   Breastfeeding? No   BMI 31.09 kg/m²     "

## 2018-02-07 NOTE — ANESTHESIA PREPROCEDURE EVALUATION
"                                                                                                             2018  Esther Adams is a 57 y.o., female investigational colonoscopy.  PMH - migraine, HTN.    Anesthesia Evaluation    I have reviewed the Patient Summary Reports.    I have reviewed the Nursing Notes.   I have reviewed the Medications.     Review of Systems  Anesthesia Hx:  No problems with previous Anesthesia    Social:  Non-Smoker    Cardiovascular:   Hypertension    Neurological:   Neuromuscular Disease, Headaches        Physical Exam  General:  Well nourished    Airway/Jaw/Neck:  Airway Findings: Mouth Opening: Normal Tongue: Normal  General Airway Assessment: Adult  Mallampati: III  Improves to II with phonation.  Jaw/Neck Findings:  Neck ROM: Normal ROM       Chest/Lungs:  Chest/Lungs Findings: Clear to auscultation, Normal Respiratory Rate     Heart/Vascular:  Heart Findings: Rate: Normal  Rhythm: Regular Rhythm  Sounds: Normal        Mental Status:  Mental Status Findings:  Alert and Oriented      Past Medical History       Past Medical History:   Diagnosis Date    Allergic rhinitis 2016     trial of daily allegra or any antihistamine- avoid "D" products due to high blood pressure    Benign essential hypertension 2012    Family history of stroke 2016    Hyperlipidemia 2016    Migraine syndrome      Obesity (BMI 30.0-34.9) 2016    Positive hepatitis C antibody test 2017    Vitamin D insufficiency 2017         Past Surgical History        Past Surgical History:   Procedure Laterality Date    BILATERAL OOPHORECTOMY         SECTION        HYSTERECTOMY         endometriosis    OOPHORECTOMY           Anesthesia Plan  Type of Anesthesia, risks & benefits discussed:  Anesthesia Type:  MAC  Patient's Preference:   Intra-op Monitoring Plan:   Intra-op Monitoring Plan Comments:   Post Op Pain Control Plan:   Post Op Pain Control Plan Comments: "   Induction:   IV  Beta Blocker:  Patient is not currently on a Beta-Blocker (No further documentation required).       Informed Consent: Patient understands risks and agrees with Anesthesia plan.  Questions answered. Anesthesia consent signed with patient.  ASA Score: 2     Day of Surgery Review of History & Physical: I have interviewed and examined the patient. I have reviewed the patient's H&P dated:            Ready For Surgery From Anesthesia Perspective.

## 2018-02-07 NOTE — PLAN OF CARE
"Past Medical History:   Diagnosis Date    Allergic rhinitis 9/7/2016    trial of daily allegra or any antihistamine- avoid "D" products due to high blood pressure    Benign essential hypertension 12/14/2012    Family history of stroke 9/7/2016    Hyperlipidemia 9/7/2016    Migraine syndrome     Obesity (BMI 30.0-34.9) 9/7/2016    Positive hepatitis C antibody test 9/23/2017    Vitamin D insufficiency 9/23/2017     Accompanied by spouse, ok for MD to speak to him re results of exam.  "

## 2018-02-07 NOTE — ANESTHESIA POSTPROCEDURE EVALUATION
"Anesthesia Post Evaluation    Patient: Esther Adams    Procedure(s) Performed: Procedure(s) (LRB):  COLONOSCOPY Golytely (N/A)    Final Anesthesia Type: MAC  Patient location during evaluation: GI PACU  Patient participation: Yes- Able to Participate  Level of consciousness: awake and alert and oriented  Post-procedure vital signs: reviewed and stable  Pain management: adequate  Airway patency: patent  PONV status at discharge: No PONV  Anesthetic complications: no      Cardiovascular status: blood pressure returned to baseline and hemodynamically stable  Respiratory status: unassisted, spontaneous ventilation and room air  Hydration status: euvolemic  Follow-up needed         Visit Vitals  /67   Pulse 97   Temp 37 °C (98.6 °F) (Oral)   Resp 18   Ht 5' 2" (1.575 m)   Wt 77.1 kg (170 lb)   LMP 01/01/2002 (Approximate)   SpO2 99%   Breastfeeding? No   BMI 31.09 kg/m²       Pain/Amauri Score: Presence of Pain: denies (2/7/2018  7:05 AM)      "

## 2018-02-07 NOTE — DISCHARGE INSTRUCTIONS
Discharge Summary/Instructions for after Colonoscopy with Biopsy/Polypectomy    Esther Adams  2/7/2018  Jo-Ann Simon MD    Restrictions on Activity:    -  Do not drive car, or operate machinery, make critical decisions, or do activities that   require coordination or balance for 24 hours.  - The following day: return to full activity including work.  - For 3 days: No heavy lifting, straining or running.  - Diet: Eat and drink normally unless instructed otherwise.    Treatment for Common Side Effects:  - Mild abdominal pain and bloating or excessive gas: rest, eat lightly and use a heating pad.     Symptoms to watch for and report to your physician:  1. Severe abdominal pain.  2. Fever within 24 hours after a procedure.  3. A large amount of rectal bleeding. (A small amount of blood from the rectum is not serious, especially if hemorrhoids are present.)  4. Because air was put into your colon during the procedure, expelling large amount of air from your rectum is normal.  5. You may not have a bowel movement for 1-3 days because of the colonoscopy prep. This is normal.  6. Go directly to the emergency room if you notice any of the following:     Chills and/or fever over 101   Persistent vomiting   Severe abdominal pain, other than gas cramps   Severe chest pain   Black, tarry stools   Any bleeding - exceeding one tablespoon    If you have any questions or problems, please call your Physician:    Jo-Ann Simon MD      Lab Results: Contact Physician's Office      If a complication or emergency situation arises and you are unable to reach your Physician - GO TO THE EMERGENCY ROOM.

## 2018-02-12 ENCOUNTER — PATIENT MESSAGE (OUTPATIENT)
Dept: GASTROENTEROLOGY | Facility: CLINIC | Age: 58
End: 2018-02-12

## 2018-05-02 ENCOUNTER — PATIENT MESSAGE (OUTPATIENT)
Dept: FAMILY MEDICINE | Facility: CLINIC | Age: 58
End: 2018-05-02

## 2018-05-02 RX ORDER — BUTALBITAL, ACETAMINOPHEN AND CAFFEINE 50; 325; 40 MG/1; MG/1; MG/1
1 TABLET ORAL EVERY 4 HOURS PRN
Qty: 60 TABLET | Refills: 3 | Status: SHIPPED | OUTPATIENT
Start: 2018-05-02 | End: 2019-03-20 | Stop reason: SDUPTHER

## 2018-08-27 ENCOUNTER — PATIENT MESSAGE (OUTPATIENT)
Dept: FAMILY MEDICINE | Facility: CLINIC | Age: 58
End: 2018-08-27

## 2018-08-27 ENCOUNTER — TELEPHONE (OUTPATIENT)
Dept: FAMILY MEDICINE | Facility: CLINIC | Age: 58
End: 2018-08-27

## 2018-08-27 DIAGNOSIS — Z12.31 ENCOUNTER FOR SCREENING MAMMOGRAM FOR BREAST CANCER: Primary | ICD-10-CM

## 2018-09-21 ENCOUNTER — OFFICE VISIT (OUTPATIENT)
Dept: FAMILY MEDICINE | Facility: CLINIC | Age: 58
End: 2018-09-21
Payer: COMMERCIAL

## 2018-09-21 ENCOUNTER — LAB VISIT (OUTPATIENT)
Dept: LAB | Facility: HOSPITAL | Age: 58
End: 2018-09-21
Attending: FAMILY MEDICINE
Payer: COMMERCIAL

## 2018-09-21 VITALS
SYSTOLIC BLOOD PRESSURE: 108 MMHG | BODY MASS INDEX: 32.14 KG/M2 | WEIGHT: 174.63 LBS | DIASTOLIC BLOOD PRESSURE: 82 MMHG | HEART RATE: 87 BPM | OXYGEN SATURATION: 98 % | HEIGHT: 62 IN

## 2018-09-21 DIAGNOSIS — I10 BENIGN ESSENTIAL HYPERTENSION: ICD-10-CM

## 2018-09-21 DIAGNOSIS — G43.909 MIGRAINE SYNDROME: ICD-10-CM

## 2018-09-21 DIAGNOSIS — Z87.19 HISTORY OF COLITIS: ICD-10-CM

## 2018-09-21 DIAGNOSIS — Z23 NEEDS FLU SHOT: ICD-10-CM

## 2018-09-21 DIAGNOSIS — Z90.722 S/P TAH-BSO (TOTAL ABDOMINAL HYSTERECTOMY AND BILATERAL SALPINGO-OOPHORECTOMY): ICD-10-CM

## 2018-09-21 DIAGNOSIS — Z90.79 S/P TAH-BSO (TOTAL ABDOMINAL HYSTERECTOMY AND BILATERAL SALPINGO-OOPHORECTOMY): ICD-10-CM

## 2018-09-21 DIAGNOSIS — Z90.710 S/P TAH-BSO (TOTAL ABDOMINAL HYSTERECTOMY AND BILATERAL SALPINGO-OOPHORECTOMY): ICD-10-CM

## 2018-09-21 DIAGNOSIS — E78.5 HYPERLIPIDEMIA, UNSPECIFIED HYPERLIPIDEMIA TYPE: ICD-10-CM

## 2018-09-21 DIAGNOSIS — J30.89 NON-SEASONAL ALLERGIC RHINITIS, UNSPECIFIED TRIGGER: ICD-10-CM

## 2018-09-21 DIAGNOSIS — E55.9 VITAMIN D INSUFFICIENCY: ICD-10-CM

## 2018-09-21 DIAGNOSIS — Z79.82 LONG-TERM USE OF ASPIRIN THERAPY: ICD-10-CM

## 2018-09-21 DIAGNOSIS — E66.9 OBESITY (BMI 30.0-34.9): ICD-10-CM

## 2018-09-21 DIAGNOSIS — R76.8 POSITIVE HEPATITIS C ANTIBODY TEST: ICD-10-CM

## 2018-09-21 DIAGNOSIS — Z79.899 MEDICATION MANAGEMENT: ICD-10-CM

## 2018-09-21 DIAGNOSIS — F43.29 PROLONGED GRIEF REACTION: ICD-10-CM

## 2018-09-21 DIAGNOSIS — Z00.00 ROUTINE GENERAL MEDICAL EXAMINATION AT A HEALTH CARE FACILITY: ICD-10-CM

## 2018-09-21 DIAGNOSIS — Z78.0 POSTMENOPAUSAL: ICD-10-CM

## 2018-09-21 DIAGNOSIS — N95.2 ATROPHIC VAGINITIS: ICD-10-CM

## 2018-09-21 DIAGNOSIS — Z82.3 FAMILY HISTORY OF STROKE: ICD-10-CM

## 2018-09-21 DIAGNOSIS — Z00.00 ROUTINE GENERAL MEDICAL EXAMINATION AT A HEALTH CARE FACILITY: Primary | ICD-10-CM

## 2018-09-21 LAB
25(OH)D3+25(OH)D2 SERPL-MCNC: 26 NG/ML
ALBUMIN SERPL BCP-MCNC: 4.1 G/DL
ALP SERPL-CCNC: 84 U/L
ALT SERPL W/O P-5'-P-CCNC: 30 U/L
ANION GAP SERPL CALC-SCNC: 10 MMOL/L
AST SERPL-CCNC: 29 U/L
BASOPHILS # BLD AUTO: 0.04 K/UL
BASOPHILS NFR BLD: 0.6 %
BILIRUB SERPL-MCNC: 0.4 MG/DL
BUN SERPL-MCNC: 11 MG/DL
CALCIUM SERPL-MCNC: 9.9 MG/DL
CHLORIDE SERPL-SCNC: 102 MMOL/L
CHOLEST SERPL-MCNC: 167 MG/DL
CHOLEST/HDLC SERPL: 3.3 {RATIO}
CO2 SERPL-SCNC: 28 MMOL/L
CREAT SERPL-MCNC: 0.9 MG/DL
DIFFERENTIAL METHOD: ABNORMAL
EOSINOPHIL # BLD AUTO: 0.1 K/UL
EOSINOPHIL NFR BLD: 1.6 %
ERYTHROCYTE [DISTWIDTH] IN BLOOD BY AUTOMATED COUNT: 12.8 %
EST. GFR  (AFRICAN AMERICAN): >60 ML/MIN/1.73 M^2
EST. GFR  (NON AFRICAN AMERICAN): >60 ML/MIN/1.73 M^2
ESTIMATED AVG GLUCOSE: 114 MG/DL
GLUCOSE SERPL-MCNC: 105 MG/DL
HBA1C MFR BLD HPLC: 5.6 %
HCT VFR BLD AUTO: 39 %
HDLC SERPL-MCNC: 51 MG/DL
HDLC SERPL: 30.5 %
HGB BLD-MCNC: 13 G/DL
LDLC SERPL CALC-MCNC: 99.6 MG/DL
LYMPHOCYTES # BLD AUTO: 2.2 K/UL
LYMPHOCYTES NFR BLD: 32.1 %
MCH RBC QN AUTO: 29.7 PG
MCHC RBC AUTO-ENTMCNC: 33.3 G/DL
MCV RBC AUTO: 89 FL
MONOCYTES # BLD AUTO: 0.3 K/UL
MONOCYTES NFR BLD: 3.6 %
NEUTROPHILS # BLD AUTO: 4.3 K/UL
NEUTROPHILS NFR BLD: 62 %
NONHDLC SERPL-MCNC: 116 MG/DL
PLATELET # BLD AUTO: 320 K/UL
PMV BLD AUTO: 9.7 FL
POTASSIUM SERPL-SCNC: 3.8 MMOL/L
PROT SERPL-MCNC: 7.9 G/DL
RBC # BLD AUTO: 4.38 M/UL
SODIUM SERPL-SCNC: 140 MMOL/L
TRIGL SERPL-MCNC: 82 MG/DL
TSH SERPL DL<=0.005 MIU/L-ACNC: 1.02 UIU/ML
WBC # BLD AUTO: 6.88 K/UL

## 2018-09-21 PROCEDURE — 99396 PREV VISIT EST AGE 40-64: CPT | Mod: 25,S$GLB,, | Performed by: FAMILY MEDICINE

## 2018-09-21 PROCEDURE — 3079F DIAST BP 80-89 MM HG: CPT | Mod: CPTII,S$GLB,, | Performed by: FAMILY MEDICINE

## 2018-09-21 PROCEDURE — 90471 IMMUNIZATION ADMIN: CPT | Mod: S$GLB,,, | Performed by: FAMILY MEDICINE

## 2018-09-21 PROCEDURE — 85025 COMPLETE CBC W/AUTO DIFF WBC: CPT

## 2018-09-21 PROCEDURE — 87522 HEPATITIS C REVRS TRNSCRPJ: CPT

## 2018-09-21 PROCEDURE — 99999 PR PBB SHADOW E&M-EST. PATIENT-LVL III: CPT | Mod: PBBFAC,,, | Performed by: FAMILY MEDICINE

## 2018-09-21 PROCEDURE — 80061 LIPID PANEL: CPT

## 2018-09-21 PROCEDURE — 3074F SYST BP LT 130 MM HG: CPT | Mod: CPTII,S$GLB,, | Performed by: FAMILY MEDICINE

## 2018-09-21 PROCEDURE — 82306 VITAMIN D 25 HYDROXY: CPT

## 2018-09-21 PROCEDURE — 80053 COMPREHEN METABOLIC PANEL: CPT

## 2018-09-21 PROCEDURE — 90686 IIV4 VACC NO PRSV 0.5 ML IM: CPT | Mod: S$GLB,,, | Performed by: FAMILY MEDICINE

## 2018-09-21 PROCEDURE — 84443 ASSAY THYROID STIM HORMONE: CPT

## 2018-09-21 PROCEDURE — 83036 HEMOGLOBIN GLYCOSYLATED A1C: CPT

## 2018-09-21 PROCEDURE — 36415 COLL VENOUS BLD VENIPUNCTURE: CPT

## 2018-09-21 RX ORDER — AMITRIPTYLINE HYDROCHLORIDE 50 MG/1
TABLET, FILM COATED ORAL
Qty: 180 TABLET | Refills: 3 | Status: SHIPPED | OUTPATIENT
Start: 2018-09-21 | End: 2019-09-16 | Stop reason: SDUPTHER

## 2018-09-21 RX ORDER — ESTRADIOL 0.1 MG/G
1 CREAM VAGINAL
Qty: 42.5 G | Refills: 11 | Status: SHIPPED | OUTPATIENT
Start: 2018-09-24 | End: 2019-09-16 | Stop reason: SDUPTHER

## 2018-09-21 RX ORDER — LISINOPRIL 20 MG/1
20 TABLET ORAL DAILY
Qty: 90 TABLET | Refills: 4 | Status: SHIPPED | OUTPATIENT
Start: 2018-09-21 | End: 2019-09-16 | Stop reason: SDUPTHER

## 2018-09-21 RX ORDER — HYDROCHLOROTHIAZIDE 25 MG/1
25 TABLET ORAL DAILY
Qty: 90 TABLET | Refills: 4 | Status: SHIPPED | OUTPATIENT
Start: 2018-09-21 | End: 2019-09-16 | Stop reason: SDUPTHER

## 2018-09-21 RX ORDER — SIMVASTATIN 20 MG/1
20 TABLET, FILM COATED ORAL DAILY
Qty: 90 TABLET | Refills: 4 | Status: SHIPPED | OUTPATIENT
Start: 2018-09-21 | End: 2019-09-16 | Stop reason: SDUPTHER

## 2018-09-21 NOTE — PROGRESS NOTES
" Office Visit    Patient Name: Esther Adams    : 1960  MRN: [id number]    Subjective:  Esther is a 58 y.o. female who presents today for:    Annual Exam and Medication Management (requesting 90 day supply.)    Esther Adams presents today for annual wellness exam and monitoring of chronic health conditions including obesity, hypertension, hyperlipidemia, history of colitis with recent GI evaluation and colonoscopy 2018.      She is post menopausal.  She had a EUGENIE/BSO for endometrosis.      They have been feeling overall well.  Her digestion has overall remained stable ever since her episode of colitis, though she does feel that she is not able to eat lobster as this may have precipitated the previous episode and some of her symptoms reoccurred when she had a little bit of that at a seafood restaurant.  Vaginal dryness is improved with Estrace cream.  Sleep and migraines remained stable with use of nightly Elavil.  Her mood is overall good but she does have some issues with prolonged unresolved grief related to the death of her mother 2 years ago     General lifestyle habits are as follows:  Diet is described as healthy-- under more stress at work and not able to prepare meals as much , exercise is described as recently fair- will go back to the gym soon but right now doing a little walking and home gym stuff, sleep is described as good-- Elavil helps with sleep and migraines. Weight overall stable at about 174 going back a few years.      Immunizations: TDaP 2016, FLU SHOT TODAY     Screening Tests: colonoscopy 2018--> biopsy benign and repeat 5 years, mammogram 8/3/2017--> repeat 1 year and ordered, DEXA at age 64, no longer needs paps-- EUGENIE/BSO secondary to endometriosis     Eye/Dental: dental is due, up to date with eye          Past Medical History  Past Medical History:   Diagnosis Date    Allergic rhinitis 2016    trial of daily allegra or any antihistamine- avoid "D" products due " to high blood pressure    Benign essential hypertension 2012    Family history of stroke 2016    Hyperlipidemia 2016    Migraine syndrome     Obesity (BMI 30.0-34.9) 2016    Positive hepatitis C antibody test 2017    Vitamin D insufficiency 2017       Past Surgical History  Past Surgical History:   Procedure Laterality Date    BILATERAL OOPHORECTOMY       SECTION      COLONOSCOPY N/A 2018    Procedure: COLONOSCOPY Golytely;  Surgeon: Jo-Ann Simon MD;  Location: [facility] END[facility];  Service: Endoscopy;  Laterality: N/A;    COLONOSCOPY Golytely N/A 2018    Performed by Jo-Ann Simon MD at [facility]    HYSTERECTOMY      endometriosis    OOPHORECTOMY         Family History  Family History   Problem Relation Age of Onset    Breast cancer Mother     Heart disease Mother     Stroke Mother     Hypertension Mother     Glaucoma Mother     Thyroid disease Mother     Stroke Father     Schizophrenia Father     Pancreatitis Father         viral    Migraines Father     Migraines Sister     Ovarian cancer Maternal Grandmother 65    Ovarian cancer Other 61       Social History  Social History     Socioeconomic History    Marital status:      Spouse name: Not on file    Number of children: Not on file    Years of education: Not on file    Highest education level: Not on file   Social Needs    Financial resource strain: Not on file    Food insecurity - worry: Not on file    Food insecurity - inability: Not on file    Transportation needs - medical: Not on file    Transportation needs - non-medical: Not on file   Occupational History    Not on file   Tobacco Use    Smoking status: Never Smoker    Smokeless tobacco: Never Used   Substance and Sexual Activity    Alcohol use: No     Comment: couple of glasses per week    Drug use: No    Sexual activity: Yes     Partners: Male     Comment:    Other Topics Concern    Not on file  "  Social History Narrative    Not on file       Current Medications  Medications reviewed and updated.     Allergies   Review of patient's allergies indicates:   Allergen Reactions    Morphine      Other reaction(s): Itching       Review of Systems (Pertinent positives)  Review of Systems   Constitutional: Negative for unexpected weight change.   HENT: Negative for dental problem and hearing loss.    Eyes: Negative for visual disturbance.   Respiratory: Negative for shortness of breath.    Cardiovascular: Negative for chest pain.   Gastrointestinal: Positive for rectal pain (hemorrhoids). Negative for blood in stool, constipation and diarrhea.   Genitourinary: Positive for vaginal pain (dryness). Negative for dysuria.   Neurological: Negative for dizziness, syncope, light-headedness and headaches.   Psychiatric/Behavioral: Positive for dysphoric mood.       /82 (BP Location: Right arm, Patient Position: Sitting)   Pulse 87   Ht 5' 2" (1.575 m)   Wt 79.2 kg (174 lb 9.7 oz)   LMP 01/01/2002 (Approximate) Comment: 15-18 yrs ago  SpO2 98%   BMI 31.94 kg/m²     Physical Exam   Constitutional: She is oriented to person, place, and time. She appears well-developed and well-nourished. No distress.   HENT:   Head: Normocephalic and atraumatic.   Right Ear: Ear canal normal. Tympanic membrane is not erythematous and not bulging.   Left Ear: Ear canal normal. Tympanic membrane is not erythematous and not bulging.   Mouth/Throat: No oropharyngeal exudate.   Eyes: Conjunctivae are normal.   Neck: Carotid bruit is not present. No thyroid mass and no thyromegaly present.   Cardiovascular: Normal rate, regular rhythm and normal heart sounds.   No murmur heard.  Pulses:       Dorsalis pedis pulses are 2+ on the right side, and 2+ on the left side.   Pulmonary/Chest: Effort normal and breath sounds normal. No respiratory distress.   Abdominal: Soft. Bowel sounds are normal. She exhibits no distension and no mass. There " is no hepatosplenomegaly. There is no tenderness.   Musculoskeletal: Normal range of motion.   Lymphadenopathy:     She has no cervical adenopathy.   Neurological: She is alert and oriented to person, place, and time.   Skin: Skin is warm and dry. No rash noted.   Psychiatric: She has a normal mood and affect.   Vitals reviewed.        Assessment/Plan:  Esther Adams is a 58 y.o. female who presents today for :    Esther was seen today for annual exam and medication management.    Diagnoses and all orders for this visit:    Routine general medical examination at a health care facility  Comments:  Health maintenance reviewed, flu shot today and mammogram scheduled.  Advised on healthy diet, regular exercise, eye and dental exams  Orders:  -     Hemoglobin A1c; Future  -     Comprehensive metabolic panel; Future  -     Lipid panel; Future  -     CBC auto differential; Future  -     TSH; Future  -     Vitamin D; Future  -     HEPATITIS C RNA, QUANTITATIVE, PCR; Future    Obesity (BMI 30.0-34.9)    S/P EUGENIE-BSO (total abdominal hysterectomy and bilateral salpingo-oophorectomy)    Benign essential hypertension  Comments:  controlled on current meds  Orders:  -     Comprehensive metabolic panel; Future  -     Lipid panel; Future  -     TSH; Future  -     lisinopril (PRINIVIL,ZESTRIL) 20 MG tablet; Take 1 tablet (20 mg total) by mouth once daily.  -     hydroCHLOROthiazide (HYDRODIURIL) 25 MG tablet; Take 1 tablet (25 mg total) by mouth once daily.    Family history of stroke    Hyperlipidemia, unspecified hyperlipidemia type  -     Comprehensive metabolic panel; Future  -     Lipid panel; Future  -     simvastatin (ZOCOR) 20 MG tablet; Take 1 tablet (20 mg total) by mouth once daily.    Long-term use of aspirin therapy    Postmenopausal  -     estradiol (ESTRACE) 0.01 % (0.1 mg/gram) vaginal cream; Place 1 g vaginally twice a week. Use cream nightly for very 1st 2 weeks    Atrophic vaginitis  -     estradiol  (ESTRACE) 0.01 % (0.1 mg/gram) vaginal cream; Place 1 g vaginally twice a week. Use cream nightly for very 1st 2 weeks    Migraine syndrome  Comments:  improved on amitriptyline  Orders:  -     amitriptyline (ELAVIL) 50 MG tablet; Take 1-2 tablets by mouth nightly for sleep and migraine prevention    Non-seasonal allergic rhinitis, unspecified trigger    Positive hepatitis C antibody test  Comments:  undetectable viral load 10/2017  Orders:  -     HEPATITIS C RNA, QUANTITATIVE, PCR; Future    Medication management  -     Hemoglobin A1c; Future  -     Comprehensive metabolic panel; Future  -     Lipid panel; Future  -     CBC auto differential; Future  -     TSH; Future  -     Vitamin D; Future  -     HEPATITIS C RNA, QUANTITATIVE, PCR; Future    History of colitis  Comments:  2/7/2018 colonoscopy with benign biopsy    Vitamin D insufficiency  -     Vitamin D; Future    Prolonged grief reaction  Comments:  considering employee health counselling    Needs flu shot  -     Influenza - Quadrivalent (3 years & older) (PF)            ICD-10-CM ICD-9-CM    1. Routine general medical examination at a health care facility Z00.00 V70.0 Hemoglobin A1c      Comprehensive metabolic panel      Lipid panel      CBC auto differential      TSH      Vitamin D      HEPATITIS C RNA, QUANTITATIVE, PCR    Health maintenance reviewed, flu shot today and mammogram scheduled.  Advised on healthy diet, regular exercise, eye and dental exams   2. Obesity (BMI 30.0-34.9) E66.9 278.00    3. S/P EUGENIE-BSO (total abdominal hysterectomy and bilateral salpingo-oophorectomy) Z90.710 V88.01     Z90.722 V45.77     Z90.79     4. Benign essential hypertension I10 401.1 Comprehensive metabolic panel      Lipid panel      TSH      lisinopril (PRINIVIL,ZESTRIL) 20 MG tablet      hydroCHLOROthiazide (HYDRODIURIL) 25 MG tablet    controlled on current meds   5. Family history of stroke Z82.3 V17.1    6. Hyperlipidemia, unspecified hyperlipidemia type E78.5  272.4 Comprehensive metabolic panel      Lipid panel      simvastatin (ZOCOR) 20 MG tablet   7. Long-term use of aspirin therapy Z79.82 V58.66    8. Postmenopausal Z78.0 V49.81 estradiol (ESTRACE) 0.01 % (0.1 mg/gram) vaginal cream   9. Atrophic vaginitis N95.2 627.3 estradiol (ESTRACE) 0.01 % (0.1 mg/gram) vaginal cream   10. Migraine syndrome G43.909 346.00 amitriptyline (ELAVIL) 50 MG tablet    improved on amitriptyline   11. Non-seasonal allergic rhinitis, unspecified trigger J30.89 477.8    12. Positive hepatitis C antibody test R76.8 795.79 HEPATITIS C RNA, QUANTITATIVE, PCR    undetectable viral load 10/2017   13. Medication management Z79.899 V58.69 Hemoglobin A1c      Comprehensive metabolic panel      Lipid panel      CBC auto differential      TSH      Vitamin D      HEPATITIS C RNA, QUANTITATIVE, PCR   14. History of colitis Z87.19 V12.79     2/7/2018 colonoscopy with benign biopsy   15. Vitamin D insufficiency E55.9 268.9 Vitamin D   16. Prolonged grief reaction F43.29 309.1     considering employee health counselling   17. Needs flu shot Z23 V04.81 Influenza - Quadrivalent (3 years & older) (PF)       There are no Patient Instructions on file for this visit.      Follow-up in about 1 year (around 9/21/2019) for return as needed for new concerns.

## 2018-09-24 ENCOUNTER — HOSPITAL ENCOUNTER (OUTPATIENT)
Dept: RADIOLOGY | Facility: HOSPITAL | Age: 58
Discharge: HOME | End: 2018-09-24
Attending: FAMILY MEDICINE
Payer: COMMERCIAL

## 2018-09-24 DIAGNOSIS — Z12.31 ENCOUNTER FOR SCREENING MAMMOGRAM FOR BREAST CANCER: ICD-10-CM

## 2018-09-24 LAB
HCV LOG: <1.08 LOG (10) IU/ML
HCV RNA QUANT PCR: <12 IU/ML
HCV, QUALITATIVE: NOT DETECTED IU/ML

## 2018-09-24 PROCEDURE — 77067 SCR MAMMO BI INCL CAD: CPT | Mod: 26,,, | Performed by: RADIOLOGY

## 2018-09-24 PROCEDURE — 77063 BREAST TOMOSYNTHESIS BI: CPT | Mod: 26,,, | Performed by: RADIOLOGY

## 2018-09-24 PROCEDURE — 77067 SCR MAMMO BI INCL CAD: CPT | Mod: TC

## 2018-10-19 ENCOUNTER — OFFICE VISIT (OUTPATIENT)
Dept: FAMILY MEDICINE | Facility: CLINIC | Age: 58
End: 2018-10-19
Payer: COMMERCIAL

## 2018-10-19 ENCOUNTER — HOSPITAL ENCOUNTER (OUTPATIENT)
Dept: RADIOLOGY | Facility: HOSPITAL | Age: 58
Discharge: HOME | End: 2018-10-19
Attending: FAMILY MEDICINE
Payer: COMMERCIAL

## 2018-10-19 ENCOUNTER — TELEPHONE (OUTPATIENT)
Dept: FAMILY MEDICINE | Facility: CLINIC | Age: 58
End: 2018-10-19

## 2018-10-19 VITALS
HEART RATE: 99 BPM | DIASTOLIC BLOOD PRESSURE: 70 MMHG | BODY MASS INDEX: 32.09 KG/M2 | WEIGHT: 174.38 LBS | SYSTOLIC BLOOD PRESSURE: 122 MMHG | HEIGHT: 62 IN | OXYGEN SATURATION: 97 %

## 2018-10-19 DIAGNOSIS — T14.90XA TRAUMA: ICD-10-CM

## 2018-10-19 DIAGNOSIS — M79.674 PAIN OF TOE OF RIGHT FOOT: ICD-10-CM

## 2018-10-19 DIAGNOSIS — M79.671 RIGHT FOOT PAIN: Primary | ICD-10-CM

## 2018-10-19 DIAGNOSIS — M79.671 RIGHT FOOT PAIN: ICD-10-CM

## 2018-10-19 PROCEDURE — 73630 X-RAY EXAM OF FOOT: CPT | Mod: TC,FY,RT

## 2018-10-19 PROCEDURE — 3008F BODY MASS INDEX DOCD: CPT | Mod: CPTII,S$GLB,, | Performed by: FAMILY MEDICINE

## 2018-10-19 PROCEDURE — 73630 X-RAY EXAM OF FOOT: CPT | Mod: 26,RT,, | Performed by: RADIOLOGY

## 2018-10-19 PROCEDURE — 3078F DIAST BP <80 MM HG: CPT | Mod: CPTII,S$GLB,, | Performed by: FAMILY MEDICINE

## 2018-10-19 PROCEDURE — 99999 PR PBB SHADOW E&M-EST. PATIENT-LVL III: CPT | Mod: PBBFAC,,, | Performed by: FAMILY MEDICINE

## 2018-10-19 PROCEDURE — 99214 OFFICE O/P EST MOD 30 MIN: CPT | Mod: S$GLB,,, | Performed by: FAMILY MEDICINE

## 2018-10-19 PROCEDURE — 3074F SYST BP LT 130 MM HG: CPT | Mod: CPTII,S$GLB,, | Performed by: FAMILY MEDICINE

## 2018-10-19 RX ORDER — DICLOFENAC SODIUM 10 MG/G
2 GEL TOPICAL 4 TIMES DAILY
Qty: 100 G | Refills: 0 | Status: SHIPPED | OUTPATIENT
Start: 2018-10-19 | End: 2019-09-16

## 2018-10-19 RX ORDER — IBUPROFEN 600 MG/1
600 TABLET ORAL
Qty: 30 TABLET | Refills: 0 | Status: SHIPPED | OUTPATIENT
Start: 2018-10-19 | End: 2018-10-29

## 2018-10-19 NOTE — PATIENT INSTRUCTIONS

## 2018-10-19 NOTE — PROGRESS NOTES
Subjective:       Patient ID: Esther Adams is a 58 y.o. female.    Chief Complaint: Foot Swelling (right foot)    Esther is a 58 y.o. female who presents today for an urgent care visit for right foot swelling. She was walking in her bedroom and she hit her bed frame on Sunday. It didn't initially hurt, but then has progressively gotten worse. She is having trouble putting pressure on the toe. She has not taken anything for this pain. She has not rested because her grandson is in the NICU at [facility].     Pain   This is a new problem. The current episode started in the past 7 days. The problem occurs daily. The problem has been gradually worsening. Associated symptoms include joint swelling. Pertinent negatives include no anorexia, change in bowel habit, chest pain, chills, congestion, coughing, fever, myalgias, nausea, neck pain, numbness, rash, sore throat, visual change, vomiting or weakness. Exacerbated by: walking  She has tried nothing for the symptoms. The treatment provided mild relief.     Review of Systems   Constitutional: Negative for chills and fever.   HENT: Negative for congestion and sore throat.    Respiratory: Negative for cough.    Cardiovascular: Negative for chest pain.   Gastrointestinal: Negative for anorexia, change in bowel habit, nausea and vomiting.   Musculoskeletal: Positive for joint swelling. Negative for myalgias and neck pain.   Skin: Negative for rash.   Neurological: Negative for weakness and numbness.         Objective:     Vitals:    10/19/18 1504   BP: 122/70   Pulse: 99        Physical Exam   Constitutional: She appears well-developed and well-nourished.   Cardiovascular: Normal rate and regular rhythm.   Pulmonary/Chest: Effort normal and breath sounds normal.   Musculoskeletal: She exhibits tenderness.        Right foot: There is tenderness, bony tenderness and swelling. There is normal range of motion, normal capillary refill, no crepitus, no deformity and no laceration.         Left foot: There is normal range of motion, no tenderness, no bony tenderness, no swelling, normal capillary refill, no crepitus, no deformity and no laceration.        Feet:    Nursing note and vitals reviewed.      Assessment:       1. Right foot pain    2. Trauma    3. Pain of toe of right foot        Plan:       Rule out fracture  R.I.C.E  But OTC compression socks  Ibuprofen  voltaren  Wear shoes with wide toe box to help alleviated symptoms    Right foot pain  -     ibuprofen (ADVIL,MOTRIN) 600 MG tablet; Take 1 tablet (600 mg total) by mouth 3 (three) times daily with meals. for 10 days  Dispense: 30 tablet; Refill: 0  -     diclofenac sodium (VOLTAREN) 1 % Gel; Apply 2 g topically 4 (four) times daily. for 10 days  Dispense: 100 g; Refill: 0  -     X-Ray Foot Complete Right; Future; Expected date: 10/19/2018    Trauma  -     ibuprofen (ADVIL,MOTRIN) 600 MG tablet; Take 1 tablet (600 mg total) by mouth 3 (three) times daily with meals. for 10 days  Dispense: 30 tablet; Refill: 0  -     diclofenac sodium (VOLTAREN) 1 % Gel; Apply 2 g topically 4 (four) times daily. for 10 days  Dispense: 100 g; Refill: 0  -     X-Ray Foot Complete Right; Future; Expected date: 10/19/2018    Pain of toe of right foot  -     X-Ray Foot Complete Right; Future; Expected date: 10/19/2018      Warning signs discussed, patient to call with any further issues or worsening of symptoms.

## 2018-10-19 NOTE — TELEPHONE ENCOUNTER
----- Message from Linda Aguilar sent at 10/19/2018  2:02 PM CDT -----  No. [phone]   Patient hit her right foot, and it is swollen.   She would like to have an Xray done today in [place].

## 2018-11-26 DIAGNOSIS — E87.6 HYPOKALEMIA: ICD-10-CM

## 2018-11-26 DIAGNOSIS — I10 BENIGN ESSENTIAL HYPERTENSION: ICD-10-CM

## 2018-11-26 RX ORDER — POTASSIUM CHLORIDE 750 MG/1
10 TABLET, EXTENDED RELEASE ORAL DAILY
Qty: 90 TABLET | Refills: 4 | Status: SHIPPED | OUTPATIENT
Start: 2018-11-26 | End: 2019-09-16 | Stop reason: SDUPTHER

## 2019-03-20 RX ORDER — BUTALBITAL, ACETAMINOPHEN AND CAFFEINE 50; 325; 40 MG/1; MG/1; MG/1
1 TABLET ORAL EVERY 4 HOURS PRN
Qty: 60 TABLET | Refills: 3 | Status: SHIPPED | OUTPATIENT
Start: 2019-03-20 | End: 2019-09-16 | Stop reason: SDUPTHER

## 2019-09-16 ENCOUNTER — OFFICE VISIT (OUTPATIENT)
Dept: FAMILY MEDICINE | Facility: CLINIC | Age: 59
End: 2019-09-16
Payer: COMMERCIAL

## 2019-09-16 VITALS
OXYGEN SATURATION: 97 % | DIASTOLIC BLOOD PRESSURE: 70 MMHG | SYSTOLIC BLOOD PRESSURE: 108 MMHG | BODY MASS INDEX: 33.55 KG/M2 | TEMPERATURE: 98 F | HEIGHT: 62 IN | HEART RATE: 88 BPM | WEIGHT: 182.31 LBS

## 2019-09-16 DIAGNOSIS — Z12.31 ENCOUNTER FOR SCREENING MAMMOGRAM FOR BREAST CANCER: ICD-10-CM

## 2019-09-16 DIAGNOSIS — E66.9 OBESITY (BMI 30.0-34.9): ICD-10-CM

## 2019-09-16 DIAGNOSIS — R76.8 POSITIVE HEPATITIS C ANTIBODY TEST: ICD-10-CM

## 2019-09-16 DIAGNOSIS — Z23 NEED FOR SHINGLES VACCINE: ICD-10-CM

## 2019-09-16 DIAGNOSIS — E78.5 HYPERLIPIDEMIA, UNSPECIFIED HYPERLIPIDEMIA TYPE: ICD-10-CM

## 2019-09-16 DIAGNOSIS — Z00.00 ROUTINE GENERAL MEDICAL EXAMINATION AT A HEALTH CARE FACILITY: Primary | ICD-10-CM

## 2019-09-16 DIAGNOSIS — Z90.722 S/P TAH-BSO (TOTAL ABDOMINAL HYSTERECTOMY AND BILATERAL SALPINGO-OOPHORECTOMY): ICD-10-CM

## 2019-09-16 DIAGNOSIS — E55.9 VITAMIN D INSUFFICIENCY: ICD-10-CM

## 2019-09-16 DIAGNOSIS — E87.6 HYPOKALEMIA: ICD-10-CM

## 2019-09-16 DIAGNOSIS — Z23 NEEDS FLU SHOT: ICD-10-CM

## 2019-09-16 DIAGNOSIS — I87.8 VENOUS STASIS: ICD-10-CM

## 2019-09-16 DIAGNOSIS — J30.89 NON-SEASONAL ALLERGIC RHINITIS, UNSPECIFIED TRIGGER: ICD-10-CM

## 2019-09-16 DIAGNOSIS — Z79.82 LONG-TERM USE OF ASPIRIN THERAPY: ICD-10-CM

## 2019-09-16 DIAGNOSIS — I10 BENIGN ESSENTIAL HYPERTENSION: ICD-10-CM

## 2019-09-16 DIAGNOSIS — Z90.710 S/P TAH-BSO (TOTAL ABDOMINAL HYSTERECTOMY AND BILATERAL SALPINGO-OOPHORECTOMY): ICD-10-CM

## 2019-09-16 DIAGNOSIS — N95.2 ATROPHIC VAGINITIS: ICD-10-CM

## 2019-09-16 DIAGNOSIS — Z82.3 FAMILY HISTORY OF STROKE: ICD-10-CM

## 2019-09-16 DIAGNOSIS — G43.909 MIGRAINE SYNDROME: ICD-10-CM

## 2019-09-16 DIAGNOSIS — Z90.79 S/P TAH-BSO (TOTAL ABDOMINAL HYSTERECTOMY AND BILATERAL SALPINGO-OOPHORECTOMY): ICD-10-CM

## 2019-09-16 DIAGNOSIS — Z79.899 MEDICATION MANAGEMENT: ICD-10-CM

## 2019-09-16 DIAGNOSIS — Z78.0 POSTMENOPAUSAL: ICD-10-CM

## 2019-09-16 PROCEDURE — 99999 PR PBB SHADOW E&M-EST. PATIENT-LVL IV: CPT | Mod: PBBFAC,,, | Performed by: FAMILY MEDICINE

## 2019-09-16 PROCEDURE — 3078F PR MOST RECENT DIASTOLIC BLOOD PRESSURE < 80 MM HG: ICD-10-PCS | Mod: CPTII,S$GLB,, | Performed by: FAMILY MEDICINE

## 2019-09-16 PROCEDURE — 3074F PR MOST RECENT SYSTOLIC BLOOD PRESSURE < 130 MM HG: ICD-10-PCS | Mod: CPTII,S$GLB,, | Performed by: FAMILY MEDICINE

## 2019-09-16 PROCEDURE — 99396 PR PREVENTIVE VISIT,EST,40-64: ICD-10-PCS | Mod: S$GLB,,, | Performed by: FAMILY MEDICINE

## 2019-09-16 PROCEDURE — 99396 PREV VISIT EST AGE 40-64: CPT | Mod: S$GLB,,, | Performed by: FAMILY MEDICINE

## 2019-09-16 PROCEDURE — 3078F DIAST BP <80 MM HG: CPT | Mod: CPTII,S$GLB,, | Performed by: FAMILY MEDICINE

## 2019-09-16 PROCEDURE — 99999 PR PBB SHADOW E&M-EST. PATIENT-LVL IV: ICD-10-PCS | Mod: PBBFAC,,, | Performed by: FAMILY MEDICINE

## 2019-09-16 PROCEDURE — 3074F SYST BP LT 130 MM HG: CPT | Mod: CPTII,S$GLB,, | Performed by: FAMILY MEDICINE

## 2019-09-16 RX ORDER — LISINOPRIL 20 MG/1
20 TABLET ORAL DAILY
Qty: 90 TABLET | Refills: 4 | Status: SHIPPED | OUTPATIENT
Start: 2019-09-16 | End: 2020-10-23 | Stop reason: SDUPTHER

## 2019-09-16 RX ORDER — HYDROCHLOROTHIAZIDE 25 MG/1
25 TABLET ORAL DAILY
Qty: 90 TABLET | Refills: 4 | Status: SHIPPED | OUTPATIENT
Start: 2019-09-16 | End: 2020-11-24 | Stop reason: SDUPTHER

## 2019-09-16 RX ORDER — POTASSIUM CHLORIDE 750 MG/1
10 TABLET, EXTENDED RELEASE ORAL DAILY
Qty: 90 TABLET | Refills: 4 | Status: SHIPPED | OUTPATIENT
Start: 2019-09-16 | End: 2020-10-09 | Stop reason: SDUPTHER

## 2019-09-16 RX ORDER — BUTALBITAL, ACETAMINOPHEN AND CAFFEINE 50; 325; 40 MG/1; MG/1; MG/1
1 TABLET ORAL EVERY 4 HOURS PRN
Qty: 60 TABLET | Refills: 3 | Status: SHIPPED | OUTPATIENT
Start: 2019-09-16 | End: 2021-03-05 | Stop reason: SDUPTHER

## 2019-09-16 RX ORDER — ESTRADIOL 0.1 MG/G
1 CREAM VAGINAL
Qty: 42.5 G | Refills: 11 | Status: SHIPPED | OUTPATIENT
Start: 2019-09-16 | End: 2022-10-03 | Stop reason: SDUPTHER

## 2019-09-16 RX ORDER — AMITRIPTYLINE HYDROCHLORIDE 50 MG/1
TABLET, FILM COATED ORAL
Qty: 180 TABLET | Refills: 3 | Status: SHIPPED | OUTPATIENT
Start: 2019-09-16 | End: 2020-12-17 | Stop reason: SDUPTHER

## 2019-09-16 RX ORDER — DICYCLOMINE HYDROCHLORIDE 10 MG/1
10 CAPSULE ORAL
Qty: 30 CAPSULE | Refills: 11 | Status: SHIPPED | OUTPATIENT
Start: 2019-09-16 | End: 2019-10-15

## 2019-09-16 RX ORDER — SIMVASTATIN 20 MG/1
20 TABLET, FILM COATED ORAL DAILY
Qty: 90 TABLET | Refills: 4 | Status: SHIPPED | OUTPATIENT
Start: 2019-09-16 | End: 2020-12-17 | Stop reason: SDUPTHER
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
## 2019-09-16 NOTE — PATIENT INSTRUCTIONS
Advise following up through the pharmacy or Employee Health regarding the FLU SHOT and also the 2 PART SHINGRIX SHINGLES VACCINE.

## 2019-09-18 ENCOUNTER — LAB VISIT (OUTPATIENT)
Dept: LAB | Facility: HOSPITAL | Age: 59
End: 2019-09-18
Attending: FAMILY MEDICINE
Payer: COMMERCIAL

## 2019-09-18 DIAGNOSIS — Z79.899 MEDICATION MANAGEMENT: ICD-10-CM

## 2019-09-18 DIAGNOSIS — E78.5 HYPERLIPIDEMIA, UNSPECIFIED HYPERLIPIDEMIA TYPE: ICD-10-CM

## 2019-09-18 DIAGNOSIS — Z00.00 ROUTINE GENERAL MEDICAL EXAMINATION AT A HEALTH CARE FACILITY: ICD-10-CM

## 2019-09-18 DIAGNOSIS — I10 BENIGN ESSENTIAL HYPERTENSION: ICD-10-CM

## 2019-09-18 DIAGNOSIS — E55.9 VITAMIN D INSUFFICIENCY: ICD-10-CM

## 2019-09-18 LAB
25(OH)D3+25(OH)D2 SERPL-MCNC: 61 NG/ML (ref 30–96)
ALBUMIN SERPL BCP-MCNC: 4.1 G/DL (ref 3.5–5.2)
ALP SERPL-CCNC: 79 U/L (ref 55–135)
ALT SERPL W/O P-5'-P-CCNC: 23 U/L (ref 10–44)
ANION GAP SERPL CALC-SCNC: 8 MMOL/L (ref 8–16)
AST SERPL-CCNC: 22 U/L (ref 10–40)
BASOPHILS # BLD AUTO: 0.03 K/UL (ref 0–0.2)
BASOPHILS NFR BLD: 0.4 % (ref 0–1.9)
BILIRUB SERPL-MCNC: 0.5 MG/DL (ref 0.1–1)
BUN SERPL-MCNC: 23 MG/DL (ref 6–20)
CALCIUM SERPL-MCNC: 9.7 MG/DL (ref 8.7–10.5)
CHLORIDE SERPL-SCNC: 101 MMOL/L (ref 95–110)
CHOLEST SERPL-MCNC: 169 MG/DL (ref 120–199)
CHOLEST/HDLC SERPL: 3.4 {RATIO} (ref 2–5)
CO2 SERPL-SCNC: 31 MMOL/L (ref 23–29)
CREAT SERPL-MCNC: 1.2 MG/DL (ref 0.5–1.4)
DIFFERENTIAL METHOD: NORMAL
EOSINOPHIL # BLD AUTO: 0.2 K/UL (ref 0–0.5)
EOSINOPHIL NFR BLD: 2.8 % (ref 0–8)
ERYTHROCYTE [DISTWIDTH] IN BLOOD BY AUTOMATED COUNT: 12.8 % (ref 11.5–14.5)
EST. GFR  (AFRICAN AMERICAN): 57 ML/MIN/1.73 M^2
EST. GFR  (NON AFRICAN AMERICAN): 49 ML/MIN/1.73 M^2
ESTIMATED AVG GLUCOSE: 123 MG/DL (ref 68–131)
GLUCOSE SERPL-MCNC: 122 MG/DL (ref 70–110)
HBA1C MFR BLD HPLC: 5.9 % (ref 4–5.6)
HCT VFR BLD AUTO: 39.3 % (ref 37–48.5)
HDLC SERPL-MCNC: 50 MG/DL (ref 40–75)
HDLC SERPL: 29.6 % (ref 20–50)
HGB BLD-MCNC: 12.8 G/DL (ref 12–16)
LDLC SERPL CALC-MCNC: 96.8 MG/DL (ref 63–159)
LYMPHOCYTES # BLD AUTO: 2.9 K/UL (ref 1–4.8)
LYMPHOCYTES NFR BLD: 34.4 % (ref 18–48)
MCH RBC QN AUTO: 29.6 PG (ref 27–31)
MCHC RBC AUTO-ENTMCNC: 32.6 G/DL (ref 32–36)
MCV RBC AUTO: 91 FL (ref 82–98)
MONOCYTES # BLD AUTO: 0.4 K/UL (ref 0.3–1)
MONOCYTES NFR BLD: 5.3 % (ref 4–15)
NEUTROPHILS # BLD AUTO: 4.8 K/UL (ref 1.8–7.7)
NEUTROPHILS NFR BLD: 57.1 % (ref 38–73)
NONHDLC SERPL-MCNC: 119 MG/DL
PLATELET # BLD AUTO: 331 K/UL (ref 150–350)
PMV BLD AUTO: 9.5 FL (ref 9.2–12.9)
POTASSIUM SERPL-SCNC: 4.1 MMOL/L (ref 3.5–5.1)
PROT SERPL-MCNC: 7.6 G/DL (ref 6–8.4)
RBC # BLD AUTO: 4.32 M/UL (ref 4–5.4)
SODIUM SERPL-SCNC: 140 MMOL/L (ref 136–145)
TRIGL SERPL-MCNC: 111 MG/DL (ref 30–150)
TSH SERPL DL<=0.005 MIU/L-ACNC: 2.39 UIU/ML (ref 0.4–4)
WBC # BLD AUTO: 8.35 K/UL (ref 3.9–12.7)

## 2019-09-18 PROCEDURE — 80053 COMPREHEN METABOLIC PANEL: CPT

## 2019-09-18 PROCEDURE — 83036 HEMOGLOBIN GLYCOSYLATED A1C: CPT

## 2019-09-18 PROCEDURE — 82306 VITAMIN D 25 HYDROXY: CPT

## 2019-09-18 PROCEDURE — 80061 LIPID PANEL: CPT

## 2019-09-18 PROCEDURE — 36415 COLL VENOUS BLD VENIPUNCTURE: CPT

## 2019-09-18 PROCEDURE — 85025 COMPLETE CBC W/AUTO DIFF WBC: CPT

## 2019-09-18 PROCEDURE — 84443 ASSAY THYROID STIM HORMONE: CPT

## 2019-09-24 ENCOUNTER — TELEPHONE (OUTPATIENT)
Dept: FAMILY MEDICINE | Facility: CLINIC | Age: 59
End: 2019-09-24

## 2019-09-24 ENCOUNTER — TELEPHONE (OUTPATIENT)
Dept: INTERNAL MEDICINE | Facility: CLINIC | Age: 59
End: 2019-09-24

## 2019-09-24 DIAGNOSIS — N28.9 RENAL INSUFFICIENCY: Primary | ICD-10-CM

## 2019-09-24 NOTE — TELEPHONE ENCOUNTER
Called pt let her know her results, got her repeat CMP scheduled for January 6th. Pt verbalized understanding.

## 2019-09-24 NOTE — TELEPHONE ENCOUNTER
Here is recheck CMP to recheck kidney function in 3 months.  It is nonfasting-see results note on my chart.

## 2019-09-30 ENCOUNTER — HOSPITAL ENCOUNTER (OUTPATIENT)
Dept: RADIOLOGY | Facility: HOSPITAL | Age: 59
Discharge: HOME | End: 2019-09-30
Attending: FAMILY MEDICINE
Payer: COMMERCIAL

## 2019-09-30 VITALS — WEIGHT: 182 LBS | BODY MASS INDEX: 33.49 KG/M2 | HEIGHT: 62 IN

## 2019-09-30 DIAGNOSIS — Z12.31 ENCOUNTER FOR SCREENING MAMMOGRAM FOR BREAST CANCER: ICD-10-CM

## 2019-09-30 DIAGNOSIS — Z00.00 ROUTINE GENERAL MEDICAL EXAMINATION AT A HEALTH CARE FACILITY: ICD-10-CM

## 2019-09-30 PROCEDURE — 77063 MAMMO DIGITAL SCREENING BILAT WITH TOMOSYNTHESIS_CAD: ICD-10-PCS | Mod: 26,,, | Performed by: RADIOLOGY

## 2019-09-30 PROCEDURE — 77067 MAMMO DIGITAL SCREENING BILAT WITH TOMOSYNTHESIS_CAD: ICD-10-PCS | Mod: 26,,, | Performed by: RADIOLOGY

## 2019-09-30 PROCEDURE — 77067 SCR MAMMO BI INCL CAD: CPT | Mod: 26,,, | Performed by: RADIOLOGY

## 2019-09-30 PROCEDURE — 77067 SCR MAMMO BI INCL CAD: CPT | Mod: TC

## 2019-09-30 PROCEDURE — 77063 BREAST TOMOSYNTHESIS BI: CPT | Mod: 26,,, | Performed by: RADIOLOGY

## 2019-10-02 ENCOUNTER — HOSPITAL ENCOUNTER (OUTPATIENT)
Dept: RADIOLOGY | Facility: HOSPITAL | Age: 59
Discharge: HOME | End: 2019-10-02
Attending: FAMILY MEDICINE
Payer: COMMERCIAL

## 2019-10-02 DIAGNOSIS — Z00.00 ROUTINE GENERAL MEDICAL EXAMINATION AT A HEALTH CARE FACILITY: ICD-10-CM

## 2019-10-02 DIAGNOSIS — Z78.0 POSTMENOPAUSAL: ICD-10-CM

## 2019-10-02 PROCEDURE — 77080 DEXA BONE DENSITY SPINE HIP: ICD-10-PCS | Mod: 26,,, | Performed by: RADIOLOGY

## 2019-10-02 PROCEDURE — 77080 DXA BONE DENSITY AXIAL: CPT | Mod: 26,,, | Performed by: RADIOLOGY

## 2019-10-02 PROCEDURE — 77080 DXA BONE DENSITY AXIAL: CPT | Mod: TC

## 2019-10-08 ENCOUNTER — TELEPHONE (OUTPATIENT)
Dept: FAMILY MEDICINE | Facility: CLINIC | Age: 59
End: 2019-10-08

## 2019-10-08 ENCOUNTER — PATIENT MESSAGE (OUTPATIENT)
Dept: FAMILY MEDICINE | Facility: CLINIC | Age: 59
End: 2019-10-08

## 2019-10-08 DIAGNOSIS — J30.89 NON-SEASONAL ALLERGIC RHINITIS, UNSPECIFIED TRIGGER: Primary | ICD-10-CM

## 2019-10-08 RX ORDER — MONTELUKAST SODIUM 10 MG/1
10 TABLET ORAL NIGHTLY
Qty: 30 TABLET | Refills: 11 | Status: SHIPPED | OUTPATIENT
Start: 2019-10-08 | End: 2019-11-07

## 2019-10-08 RX ORDER — FAMOTIDINE 40 MG/1
40 TABLET, FILM COATED ORAL NIGHTLY
Qty: 30 TABLET | Refills: 11 | Status: SHIPPED | OUTPATIENT
Start: 2019-10-08 | End: 2019-10-24

## 2019-10-08 RX ORDER — METHYLPREDNISOLONE 4 MG/1
TABLET ORAL
Qty: 21 TABLET | Refills: 0 | Status: SHIPPED | OUTPATIENT
Start: 2019-10-08 | End: 2019-10-15

## 2019-10-08 RX ORDER — MINERAL OIL
180 ENEMA (ML) RECTAL DAILY
Qty: 30 TABLET | Refills: 11 | Status: SHIPPED | OUTPATIENT
Start: 2019-10-08 | End: 2021-03-05 | Stop reason: ALTCHOICE

## 2019-10-15 ENCOUNTER — HOSPITAL ENCOUNTER (OUTPATIENT)
Dept: RADIOLOGY | Facility: HOSPITAL | Age: 59
Discharge: HOME | End: 2019-10-15
Attending: FAMILY MEDICINE
Payer: COMMERCIAL

## 2019-10-15 ENCOUNTER — OFFICE VISIT (OUTPATIENT)
Dept: FAMILY MEDICINE | Facility: CLINIC | Age: 59
End: 2019-10-15
Payer: COMMERCIAL

## 2019-10-15 ENCOUNTER — TELEPHONE (OUTPATIENT)
Dept: FAMILY MEDICINE | Facility: CLINIC | Age: 59
End: 2019-10-15

## 2019-10-15 VITALS
DIASTOLIC BLOOD PRESSURE: 72 MMHG | HEART RATE: 70 BPM | WEIGHT: 179.25 LBS | TEMPERATURE: 98 F | BODY MASS INDEX: 32.99 KG/M2 | OXYGEN SATURATION: 99 % | SYSTOLIC BLOOD PRESSURE: 110 MMHG | HEIGHT: 62 IN

## 2019-10-15 DIAGNOSIS — R05.3 CHRONIC COUGH: ICD-10-CM

## 2019-10-15 DIAGNOSIS — J30.89 NON-SEASONAL ALLERGIC RHINITIS, UNSPECIFIED TRIGGER: ICD-10-CM

## 2019-10-15 DIAGNOSIS — R09.89 PHLEGM IN THROAT: ICD-10-CM

## 2019-10-15 DIAGNOSIS — R05.3 CHRONIC COUGH: Primary | ICD-10-CM

## 2019-10-15 DIAGNOSIS — I10 BENIGN ESSENTIAL HYPERTENSION: ICD-10-CM

## 2019-10-15 DIAGNOSIS — J32.0 MAXILLARY SINUSITIS, UNSPECIFIED CHRONICITY: ICD-10-CM

## 2019-10-15 PROCEDURE — 3078F DIAST BP <80 MM HG: CPT | Mod: CPTII,S$GLB,, | Performed by: FAMILY MEDICINE

## 2019-10-15 PROCEDURE — 99214 OFFICE O/P EST MOD 30 MIN: CPT | Mod: S$GLB,,, | Performed by: FAMILY MEDICINE

## 2019-10-15 PROCEDURE — 71046 X-RAY EXAM CHEST 2 VIEWS: CPT | Mod: TC,FY

## 2019-10-15 PROCEDURE — 3078F PR MOST RECENT DIASTOLIC BLOOD PRESSURE < 80 MM HG: ICD-10-PCS | Mod: CPTII,S$GLB,, | Performed by: FAMILY MEDICINE

## 2019-10-15 PROCEDURE — 71046 X-RAY EXAM CHEST 2 VIEWS: CPT | Mod: 26,,, | Performed by: RADIOLOGY

## 2019-10-15 PROCEDURE — 3008F BODY MASS INDEX DOCD: CPT | Mod: CPTII,S$GLB,, | Performed by: FAMILY MEDICINE

## 2019-10-15 PROCEDURE — 99214 PR OFFICE/OUTPT VISIT, EST, LEVL IV, 30-39 MIN: ICD-10-PCS | Mod: S$GLB,,, | Performed by: FAMILY MEDICINE

## 2019-10-15 PROCEDURE — 71046 XR CHEST PA AND LATERAL: ICD-10-PCS | Mod: 26,,, | Performed by: RADIOLOGY

## 2019-10-15 PROCEDURE — 3008F PR BODY MASS INDEX (BMI) DOCUMENTED: ICD-10-PCS | Mod: CPTII,S$GLB,, | Performed by: FAMILY MEDICINE

## 2019-10-15 PROCEDURE — 3074F SYST BP LT 130 MM HG: CPT | Mod: CPTII,S$GLB,, | Performed by: FAMILY MEDICINE

## 2019-10-15 PROCEDURE — 99999 PR PBB SHADOW E&M-EST. PATIENT-LVL IV: ICD-10-PCS | Mod: PBBFAC,,, | Performed by: FAMILY MEDICINE

## 2019-10-15 PROCEDURE — 99999 PR PBB SHADOW E&M-EST. PATIENT-LVL IV: CPT | Mod: PBBFAC,,, | Performed by: FAMILY MEDICINE

## 2019-10-15 PROCEDURE — 3074F PR MOST RECENT SYSTOLIC BLOOD PRESSURE < 130 MM HG: ICD-10-PCS | Mod: CPTII,S$GLB,, | Performed by: FAMILY MEDICINE

## 2019-10-15 RX ORDER — DOXYCYCLINE 100 MG/1
100 CAPSULE ORAL 2 TIMES DAILY
Qty: 20 CAPSULE | Refills: 0 | Status: SHIPPED | OUTPATIENT
Start: 2019-10-15 | End: 2019-10-25

## 2019-10-15 NOTE — TELEPHONE ENCOUNTER
----- Message from Reva Mckeon sent at 10/15/2019  9:51 AM CDT -----  Contact: self / [phone]  Can you see her this week she is not feeling well her throat hurts and she has sinus drip? Please advise

## 2019-10-15 NOTE — PROGRESS NOTES
" Office Visit    Patient Name: Esther Adams    : 1960  MRN: [id number]    Subjective:  Esther is a 59 y.o. female who presents today for:    Cough    59-year-old patient of mine recently seen 2019 for annual exam and with past medical history of obesity, hypertension, hyperlipidemia, history of colitis s/p GI evaluation and colonoscopy 2018, here today for UC visit to discuss ongoing cough.    She was reporting a bothersome cough with phlegm in her throat starting around the time of her annual.  I tried augmenting treatment for her allergies as it sounded like the cough was potentially secondary to her underlying allergies with post nasal drainage. Also added nightly pepcid for both allergy and potential GERD component. She is currently on daily allegra and nightly singulair and pepcid. She took a 6 day medrol steroid pack and reported improvement on the 1st 2 days but then her symptoms regressed to where they were prior to starting it. She reports some recently increased maxillary pressure and feeling of malaise/ subjective fever in the last couple of days.  No shortness of breath, can breath through her nose, but continuing the experiencing thick phlegm in throat with intermittently productive cough-- she can always cough up the phlegm in her throat if she tries. No shortness of breath.     Past Medical History  Past Medical History:   Diagnosis Date    Allergic rhinitis 2016    trial of daily allegra or any antihistamine- avoid "D" products due to high blood pressure    Benign essential hypertension 2012    Family history of stroke 2016    Hyperlipidemia 2016    Migraine syndrome     Obesity (BMI 30.0-34.9) 2016    Positive hepatitis C antibody test 2017    Vitamin D insufficiency 2017       Past Surgical History  Past Surgical History:   Procedure Laterality Date    BILATERAL OOPHORECTOMY       SECTION      COLONOSCOPY N/A 2018    " Procedure: COLONOSCOPY Golytely;  Surgeon: Jo-Ann Simon MD;  Location: [facility];  Service: Endoscopy;  Laterality: N/A;    HYSTERECTOMY      endometriosis    OOPHORECTOMY         Family History  Family History   Problem Relation Age of Onset    Breast cancer Mother     Heart disease Mother     Stroke Mother     Hypertension Mother     Glaucoma Mother     Thyroid disease Mother     Osteoporosis Mother     Stroke Father     Schizophrenia Father     Pancreatitis Father         viral    Migraines Father     Migraines Sister     Ovarian cancer Maternal Grandmother 65    Ovarian cancer Other 61       Social History  Social History     Socioeconomic History    Marital status:      Spouse name: Not on file    Number of children: Not on file    Years of education: Not on file    Highest education level: Not on file   Occupational History    Not on file   Social Needs    Financial resource strain: Not on file    Food insecurity:     Worry: Not on file     Inability: Not on file    Transportation needs:     Medical: Not on file     Non-medical: Not on file   Tobacco Use    Smoking status: Never Smoker    Smokeless tobacco: Never Used   Substance and Sexual Activity    Alcohol use: Yes     Frequency: 2-3 times a week     Comment: couple of glasses per week    Drug use: No    Sexual activity: Yes     Partners: Male     Comment:    Lifestyle    Physical activity:     Days per week: Not on file     Minutes per session: Not on file    Stress: Not on file   Relationships    Social connections:     Talks on phone: Not on file     Gets together: Not on file     Attends Taoism service: Not on file     Active member of club or organization: Not on file     Attends meetings of clubs or organizations: Not on file     Relationship status: Not on file   Other Topics Concern    Not on file   Social History Narrative    Not on file       Current Medications  Medications reviewed  "and updated.     Allergies   Review of patient's allergies indicates:   Allergen Reactions    Morphine      Other reaction(s): Itching       Review of Systems (Pertinent positives)  Review of Systems   Constitutional: Positive for fatigue. Negative for fever.   HENT: Positive for postnasal drip and sinus pressure. Negative for congestion (no trouble breathing through nose).    Respiratory: Positive for cough. Negative for shortness of breath.    Allergic/Immunologic: Positive for environmental allergies.       /72   Pulse 70   Temp 97.6 °F (36.4 °C)   Ht 5' 2" (1.575 m)   Wt 81.3 kg (179 lb 3.7 oz)   LMP 01/01/2002 (Approximate) Comment: 15-18 yrs ago  SpO2 99%   BMI 32.78 kg/m²     Physical Exam   Constitutional: She is oriented to person, place, and time. She appears well-developed and well-nourished. No distress.   HENT:   Head: Normocephalic and atraumatic.   Right Ear: Tympanic membrane is not erythematous and not bulging.   Left Ear: Tympanic membrane is not erythematous and not bulging.   Nose: Mucosal edema and rhinorrhea present. Right sinus exhibits maxillary sinus tenderness. Left sinus exhibits maxillary sinus tenderness.   Mouth/Throat: Posterior oropharyngeal erythema present. No oropharyngeal exudate or posterior oropharyngeal edema.   Eyes: Conjunctivae are normal.   Cardiovascular: Normal rate and regular rhythm.   Pulmonary/Chest: Effort normal and breath sounds normal.   Musculoskeletal: She exhibits no edema.   Neurological: She is alert and oriented to person, place, and time.   Skin: Skin is warm and dry.   Psychiatric: She has a normal mood and affect.   Vitals reviewed.        Assessment/Plan:  Esther Adams is a 59 y.o. female who presents today for :    Esther was seen today for cough.    Diagnoses and all orders for this visit:    Chronic cough  Comments:  poor response to allergy management- singulair/antihistamine daily, also added H2 blocker for allergies and also " GERD. check CXR & refer to ENT given ongoing sx  Orders:  -     X-Ray Chest PA And Lateral; Future  -     Ambulatory referral to ENT    Phlegm in throat  Comments:  could be draining from sinuses, has maxillary pressure and thick mucus in nares. trial of treating for sinusitis with Doxycycline antibiotic   Orders:  -     Ambulatory referral to ENT    Non-seasonal allergic rhinitis, unspecified trigger  Comments:  continue daily allegra/ nightly singluair pending ENT evaluation  Orders:  -     Ambulatory referral to ENT    Maxillary sinusitis, unspecified chronicity  Comments:  trial of Doxycyline. hold off on sinus CT pending response/ ENT evaluation  Orders:  -     doxycycline (VIBRAMYCIN) 100 MG Cap; Take 1 capsule (100 mg total) by mouth 2 (two) times daily. for 10 days  -     Ambulatory referral to ENT    Benign essential hypertension  Comments:  controlled on Lisinopril 20 and HCTZ 25 mg daily            ICD-10-CM ICD-9-CM    1. Chronic cough R05 786.2 X-Ray Chest PA And Lateral      Ambulatory referral to ENT    poor response to allergy management- singulair/antihistamine daily, also added H2 blocker for allergies and also GERD. check CXR & refer to ENT given ongoing sx   2. Phlegm in throat R09.89 786.4 Ambulatory referral to ENT    could be draining from sinuses, has maxillary pressure and thick mucus in nares. trial of treating for sinusitis with Doxycycline antibiotic    3. Non-seasonal allergic rhinitis, unspecified trigger J30.89 477.8 Ambulatory referral to ENT    continue daily allegra/ nightly singluair pending ENT evaluation   4. Maxillary sinusitis, unspecified chronicity J32.0 473.0 doxycycline (VIBRAMYCIN) 100 MG Cap      Ambulatory referral to ENT    trial of Doxycyline. hold off on sinus CT pending response/ ENT evaluation   5. Benign essential hypertension I10 401.1     controlled on Lisinopril 20 and HCTZ 25 mg daily       There are no Patient Instructions on file for this visit.      Follow  up for to review results of diagnostic procedure.

## 2019-10-17 ENCOUNTER — TELEPHONE (OUTPATIENT)
Dept: FAMILY MEDICINE | Facility: CLINIC | Age: 59
End: 2019-10-17

## 2019-10-18 ENCOUNTER — TELEPHONE (OUTPATIENT)
Dept: FAMILY MEDICINE | Facility: CLINIC | Age: 59
End: 2019-10-18

## 2019-10-21 ENCOUNTER — TELEPHONE (OUTPATIENT)
Dept: FAMILY MEDICINE | Facility: CLINIC | Age: 59
End: 2019-10-21

## 2019-10-24 ENCOUNTER — OFFICE VISIT (OUTPATIENT)
Dept: OTOLARYNGOLOGY | Facility: CLINIC | Age: 59
End: 2019-10-24
Payer: COMMERCIAL

## 2019-10-24 VITALS
HEART RATE: 89 BPM | DIASTOLIC BLOOD PRESSURE: 78 MMHG | WEIGHT: 180.56 LBS | TEMPERATURE: 98 F | SYSTOLIC BLOOD PRESSURE: 119 MMHG | HEIGHT: 62 IN | BODY MASS INDEX: 33.23 KG/M2

## 2019-10-24 DIAGNOSIS — K21.9 LARYNGOPHARYNGEAL REFLUX (LPR): ICD-10-CM

## 2019-10-24 DIAGNOSIS — J30.9 CHRONIC ALLERGIC RHINITIS: Primary | ICD-10-CM

## 2019-10-24 PROCEDURE — 99999 PR PBB SHADOW E&M-EST. PATIENT-LVL III: CPT | Mod: PBBFAC,,, | Performed by: OTOLARYNGOLOGY

## 2019-10-24 PROCEDURE — 99999 PR PBB SHADOW E&M-EST. PATIENT-LVL III: ICD-10-PCS | Mod: PBBFAC,,, | Performed by: OTOLARYNGOLOGY

## 2019-10-24 PROCEDURE — 99244 OFF/OP CNSLTJ NEW/EST MOD 40: CPT | Mod: 25,S$GLB,, | Performed by: OTOLARYNGOLOGY

## 2019-10-24 PROCEDURE — 31575 PR LARYNGOSCOPY, FLEXIBLE; DIAGNOSTIC: ICD-10-PCS | Mod: S$GLB,,, | Performed by: OTOLARYNGOLOGY

## 2019-10-24 PROCEDURE — 99244 PR OFFICE CONSULTATION,LEVEL IV: ICD-10-PCS | Mod: 25,S$GLB,, | Performed by: OTOLARYNGOLOGY

## 2019-10-24 PROCEDURE — 31575 DIAGNOSTIC LARYNGOSCOPY: CPT | Mod: S$GLB,,, | Performed by: OTOLARYNGOLOGY

## 2019-10-24 RX ORDER — FLUTICASONE PROPIONATE 50 MCG
1 SPRAY, SUSPENSION (ML) NASAL 2 TIMES DAILY
Qty: 16 G | Refills: 3 | Status: SHIPPED | OUTPATIENT
Start: 2019-10-24 | End: 2022-09-19

## 2019-10-24 RX ORDER — BUTALBITAL, ACETAMINOPHEN AND CAFFEINE 50; 325; 40 MG/1; MG/1; MG/1
TABLET ORAL
COMMUNITY
Start: 2015-05-28 | End: 2021-03-05 | Stop reason: SDUPTHER

## 2019-10-24 RX ORDER — AMITRIPTYLINE HYDROCHLORIDE 25 MG/1
TABLET, FILM COATED ORAL
COMMUNITY
Start: 2016-07-20 | End: 2021-03-04 | Stop reason: SDUPTHER

## 2019-10-24 RX ORDER — OMEPRAZOLE 40 MG/1
40 CAPSULE, DELAYED RELEASE ORAL DAILY
Qty: 30 CAPSULE | Refills: 11 | Status: SHIPPED | OUTPATIENT
Start: 2019-10-24 | End: 2021-09-10 | Stop reason: ALTCHOICE

## 2019-10-24 NOTE — PATIENT INSTRUCTIONS
For the allergic rhinitis I recommend nasal saline spray twice daily (morning and night). I also recommend Flonase (fluticasone) nasal spray daily. If tolerating daily add to twice a day. Saline to be used 30 minutes prior to other sprays. Patient was instructed on correct application of nasal spray (insert into nare and point slightly up and out towards the eye).     Also recommend an oral antihistamine- Allegra.    Patient counseled on the fact that nasal steroid sprays may take up to 2-3 weeks to experience full effects.      I would recommend nasal AYR GEL SPRAY. Use 2-3 times daily. If using nasal steroid be sure to space the two out by at least 30 min-1 hour to prevent washing medication out. Would use saline spray  before the steroid.

## 2019-10-24 NOTE — PROCEDURES
Flexible Fiberoptic Laryngoscopy    Indications: Symptoms concerning for LPR including post-nasal drip, frequent throat clearing, cough, globus sensation and Chronic Cough    Anesthesia: Topical xylocaine with arlene-synephrine    Complications: None    Findings: mild interarytenoid edema and erythema; some mild pachydermia of the posterior commissure    Procedure in Detail: After verbal consent obtained, nares were decongested and anesthetized, and flexible fiberoptic laryngoscope passed via right nare with following results:  - Nasal cavity: right mid septal deviation, inferior turbinates pale and boggy with clear rhinorrhea, no mass or lesion  - Nasopharynx: no adenoid hypertrophy, no mass or lesion  - Oropharynx: no lingual tonsil asymmetry, no mass or lesion  - Hypopharynx: no mass or lesion  - Supraglottis: no mass or lesion, mild interarytenoid edema and erythema with pachydermia of the posterior commissure  - Glottis: bilateral true vocal cords with normal mobility, no edema, no mass or lesion    Patient tolerated the procedure well

## 2019-10-24 NOTE — PROGRESS NOTES
Otolaryngology Clinic Note    Esther Adams  Encounter Date: 10/24/2019   YOB: 1960  Referring Physician: Shanta Perez Md  [address] [facility] [address]  [address]  [place] [id number]   PCP: Shanta Perez MD    Chief Complaint:   Chief Complaint   Patient presents with    Other     Referred by  for post nasal drip.       HPI: Esther Adams is a 59 y.o. female referred for chronic post nasal drip. Dr Perez has had her on claritin and zyrtec. Tried a steroid dose pack with Pepcid and Singulair. Had slight improvement with steroid but felt worse. Given doxycycline with relief. Today is last day of doxycycline. She also coughs a lot. Not currently using nasal sprays. Tried flonase once in the past but got nose bleeds. Denies nasal congestion. No further facial pressure or pain. No history of reflux. Reports occasional hoarseness that is worse in the morning. Also reports frequent throat clearing. Denies globus sensation.     Overall main symptoms that are still present are coughing, throat clearing and post nasal drip. No history of reflux. Is currently taking the Allegra, Pepcid and singulair. Symptoms all worse during the fall and when she is around fresh cut grass. No history of allergy testing.    Review of Systems   Constitutional: Negative for chills, fever and weight loss.   HENT: Positive for congestion. Negative for ear pain and sinus pain.    Eyes: Negative for blurred vision and double vision.   Respiratory: Positive for cough. Negative for shortness of breath.    Cardiovascular: Negative for chest pain and palpitations.   Gastrointestinal: Negative for nausea and vomiting.   Musculoskeletal: Negative for joint pain and neck pain.   Skin: Negative for rash.   Neurological: Negative for dizziness, focal weakness and headaches.   Psychiatric/Behavioral: Negative for depression. The patient is not nervous/anxious.         Review of patient's allergies indicates:   Allergen  "Reactions    Morphine      Other reaction(s): Itching       Past Medical History:   Diagnosis Date    Allergic rhinitis 2016    trial of daily allegra or any antihistamine- avoid "D" products due to high blood pressure    Benign essential hypertension 2012    Family history of stroke 2016    Hyperlipidemia 2016    Migraine syndrome     Obesity (BMI 30.0-34.9) 2016    Positive hepatitis C antibody test 2017    Vitamin D insufficiency 2017       Past Surgical History:   Procedure Laterality Date    BILATERAL OOPHORECTOMY       SECTION      COLONOSCOPY N/A 2018    Procedure: COLONOSCOPY Golytely;  Surgeon: Jo-Ann Smion MD;  Location: [facility];  Service: Endoscopy;  Laterality: N/A;    HYSTERECTOMY      endometriosis    OOPHORECTOMY         Social History     Socioeconomic History    Marital status:      Spouse name: Not on file    Number of children: Not on file    Years of education: Not on file    Highest education level: Not on file   Occupational History    Not on file   Social Needs    Financial resource strain: Not on file    Food insecurity:     Worry: Not on file     Inability: Not on file    Transportation needs:     Medical: Not on file     Non-medical: Not on file   Tobacco Use    Smoking status: Never Smoker    Smokeless tobacco: Never Used   Substance and Sexual Activity    Alcohol use: Yes     Frequency: 2-3 times a week     Comment: couple of glasses per week    Drug use: No    Sexual activity: Yes     Partners: Male     Comment:    Lifestyle    Physical activity:     Days per week: Not on file     Minutes per session: Not on file    Stress: Not on file   Relationships    Social connections:     Talks on phone: Not on file     Gets together: Not on file     Attends Restorationism service: Not on file     Active member of club or organization: Not on file     Attends meetings of clubs or organizations: Not on " file     Relationship status: Not on file   Other Topics Concern    Not on file   Social History Narrative    Not on file       Family History   Problem Relation Age of Onset    Breast cancer Mother     Heart disease Mother     Stroke Mother     Hypertension Mother     Glaucoma Mother     Thyroid disease Mother     Osteoporosis Mother     Stroke Father     Schizophrenia Father     Pancreatitis Father         viral    Migraines Father     Migraines Sister     Ovarian cancer Maternal Grandmother 65    Ovarian cancer Other 61       Outpatient Encounter Medications as of 10/24/2019   Medication Sig Dispense Refill    amitriptyline (ELAVIL) 25 MG tablet TAKE THREE TO FOUR TABLETS BY MOUTH ONCE DAILY IN THE EVENING      amitriptyline (ELAVIL) 50 MG tablet Take 1-2 tablets by mouth nightly for sleep and migraine prevention 180 tablet 3    aspirin (ECOTRIN) 81 MG EC tablet Take 1 tablet (81 mg total) by mouth once daily. 1 Tablet, Delayed Release (E.C.) Oral Every day 90 tablet 0    butalbital-acetaminophen-caffeine -40 mg (FIORICET, ESGIC) -40 mg per tablet Take 1 tablet by mouth every 4 (four) hours as needed for Pain or Headaches. 60 tablet 3    butalbital-acetaminophen-caffeine -40 mg (FIORICET, ESGIC) -40 mg per tablet TAKE ONE TABLET BY MOUTH EVERY 4 HOURS AS NEEDED FOR HEADACHE      doxycycline (VIBRAMYCIN) 100 MG Cap Take 1 capsule (100 mg total) by mouth 2 (two) times daily. for 10 days 20 capsule 0    estradiol (ESTRACE) 0.01 % (0.1 mg/gram) vaginal cream Place 1 g vaginally twice a week. Use cream nightly for very 1st 2 weeks 42.5 g 11    fexofenadine (ALLEGRA) 180 MG tablet Take 1 tablet (180 mg total) by mouth once daily. 30 tablet 11    hydroCHLOROthiazide (HYDRODIURIL) 25 MG tablet Take 1 tablet (25 mg total) by mouth once daily. 90 tablet 4    lisinopril (PRINIVIL,ZESTRIL) 20 MG tablet Take 1 tablet (20 mg total) by mouth once daily. 90 tablet 4     montelukast (SINGULAIR) 10 mg tablet Take 1 tablet (10 mg total) by mouth every evening. 30 tablet 11    potassium chloride (KLOR-CON) 10 MEQ TbSR Take 1 tablet (10 mEq total) by mouth once daily. 90 tablet 4    simvastatin (ZOCOR) 20 MG tablet Take 1 tablet (20 mg total) by mouth once daily. 90 tablet 4    [DISCONTINUED] famotidine (PEPCID) 40 MG tablet Take 1 tablet (40 mg total) by mouth every evening. 30 tablet 11    fluticasone propionate (FLONASE) 50 mcg/actuation nasal spray 1 spray (50 mcg total) by Each Nostril route 2 (two) times daily. 16 g 3    omeprazole (PRILOSEC) 40 MG capsule Take 1 capsule (40 mg total) by mouth once daily. 30 capsule 11     No facility-administered encounter medications on file as of 10/24/2019.        Physical Exam:    Vitals:    10/24/19 0918   BP: 119/78   Pulse: 89   Temp: 98.2 °F (36.8 °C)       Constitutional  General Appearance: well nourished, well-developed, alert, oriented, in no acute distress  Communication: ability, understanding, voice quality normal  Head and Face  Inspection: normocephalic, atraumatic, no scars, lesions or masses    Palpation: no stepoffs, sinus tenderness or masses  Parotid glands: no masses, stones, swelling or tenderness  Eyes  Ocular Motility / Alignment: normal alignment, motility, no proptosis, enophthalmus or nystagmus  Conjunctiva: not injected  Eyelids: no entropion or ectropion, no edema  Ears  Hearing: speech reception thresholds grossly normal  External Ears: no auricle lesions, non-tender, mobile to palpation  Otoscopy:  Right Ear: no tympanic membrane lesions, perforations, or effusion, normal EAC  Left Ear: no tympanic membrane lesions, perforations, or effusion, normal EAC  Nose  External Nose: no lesions, tenderness, trauma or deformity  Intranasal Exam: inferior turbinates pale and boggy with hypertrophy, clear rhinorrhea, mid-posterior right septal deviation, small inferior spur on the left  Oral Cavity / Oropharynx  Lips:  upper and lower lips pink and moist  Teeth: dentition good  Gingiva: healthy  Oral Mucosa: moist, no mucosal lesions  Floor of Mouth: normal, no lesions, salivary ducts patent  Tongue: moist, normal mobility, no lesions  Palate: soft and hard palates without lesions or ulcers  Oropharynx: tonsils and walls without erythema, exudate, lesions, fullness or obstruction  Neck  Inspection and Palpation: no erythema, induration, emphysema, tenderness or masses  Larynx and Trachea: normal position and mobility   Thyroid: no tenderness, enlargement or nodules  Submandibular Glands: no masses or tenderness  Lymphatic:  Anterior, Posterior, Submandibular, Submental, Supraclavicular: no lymphadenopathy present  Chest / Respiratory  Chest: no stridor or retractions, normal effort and expansion  Cardiovascular:  Pulses: 2+ radial pulses, regular rhythm and rate  Auscultation: deferred  Neurological  Cranial Nerves: grossly intact  General: no focal deficits  Psychiatric  Orientation: oriented to time, place and person  Mood and Affect: no depression, anxiety or agitation  Extremities  Inspection: moves all extremities well    Procedures:  Flexible Fiberoptic Laryngoscopy    Indications: Symptoms concerning for LPR including post-nasal drip, frequent throat clearing, cough, globus sensation and Chronic Cough    Anesthesia: Topical xylocaine with arlene-synephrine    Complications: None    Findings: mild interarytenoid edema and erythema; some mild pachydermia of the posterior commissure    Procedure in Detail: After verbal consent obtained, nares were decongested and anesthetized, and flexible fiberoptic laryngoscope passed via right nare with following results:  - Nasal cavity: right mid septal deviation, inferior turbinates pale and boggy with clear rhinorrhea, no mass or lesion  - Nasopharynx: no adenoid hypertrophy, no mass or lesion  - Oropharynx: no lingual tonsil asymmetry, no mass or lesion  - Hypopharynx: no mass or  lesion  - Supraglottis: no mass or lesion, mild interarytenoid edema and erythema with pachydermia of the posterior commissure  - Glottis: bilateral true vocal cords with normal mobility, no edema, no mass or lesion    Patient tolerated the procedure well        Pertinent Data:  ? LABS:   ? AUDIO:  ? PATH:      I personally reviewed the following pertinent data at today's visit:    Imaging:   ? XRAY:  ? Ultrasound:  ? CT Scan:  ? MRI Scan:  ? PET/CT Scan:    I personally reviewed the following images:    Summary of Outside Records:      Assessment and Plan:  Esther Adams is a 59 y.o. female with     Chronic allergic rhinitis  -     fluticasone propionate (FLONASE) 50 mcg/actuation nasal spray; 1 spray (50 mcg total) by Each Nostril route 2 (two) times daily.  Dispense: 16 g; Refill: 3    Laryngopharyngeal reflux (LPR)  -     omeprazole (PRILOSEC) 40 MG capsule; Take 1 capsule (40 mg total) by mouth once daily.  Dispense: 30 capsule; Refill: 11       Reviewed findings on  flexible laryngoscopy with the patient.  I think her symptoms are likely multifactorial.  Think there is likely a reflux component as well as a chronic rhinitis component.  I will have her return Flonase.  I have instructed her on proper use of the Flonase.  Will have her use Ayr gel to help keep the nose moisturized and prevent bleeds.  She is able to tolerate Flonase have recommended she try it twice daily.  I will switch her from daily Pepcid daily Prilosec. Counseled on reflux precautions and given handout    She will return in 6-8 weeks to assess response to the medication.  She will return sooner if symptoms worsen        EAntione Rodriguez MD  [facility]

## 2020-01-06 ENCOUNTER — LAB VISIT (OUTPATIENT)
Dept: LAB | Facility: HOSPITAL | Age: 60
End: 2020-01-06
Attending: FAMILY MEDICINE
Payer: COMMERCIAL

## 2020-01-06 DIAGNOSIS — N28.9 RENAL INSUFFICIENCY: ICD-10-CM

## 2020-01-06 LAB
ALBUMIN SERPL BCP-MCNC: 4.2 G/DL (ref 3.5–5.2)
ALP SERPL-CCNC: 78 U/L (ref 55–135)
ALT SERPL W/O P-5'-P-CCNC: 28 U/L (ref 10–44)
ANION GAP SERPL CALC-SCNC: 11 MMOL/L (ref 8–16)
AST SERPL-CCNC: 24 U/L (ref 10–40)
BILIRUB SERPL-MCNC: 0.4 MG/DL (ref 0.1–1)
BUN SERPL-MCNC: 18 MG/DL (ref 6–20)
CALCIUM SERPL-MCNC: 9.5 MG/DL (ref 8.7–10.5)
CHLORIDE SERPL-SCNC: 104 MMOL/L (ref 95–110)
CO2 SERPL-SCNC: 29 MMOL/L (ref 23–29)
CREAT SERPL-MCNC: 1.1 MG/DL (ref 0.5–1.4)
EST. GFR  (AFRICAN AMERICAN): >60 ML/MIN/1.73 M^2
EST. GFR  (NON AFRICAN AMERICAN): 55 ML/MIN/1.73 M^2
GLUCOSE SERPL-MCNC: 113 MG/DL (ref 70–110)
POTASSIUM SERPL-SCNC: 4 MMOL/L (ref 3.5–5.1)
PROT SERPL-MCNC: 7.5 G/DL (ref 6–8.4)
SODIUM SERPL-SCNC: 144 MMOL/L (ref 136–145)

## 2020-01-06 PROCEDURE — 80053 COMPREHEN METABOLIC PANEL: CPT

## 2020-01-06 PROCEDURE — 36415 COLL VENOUS BLD VENIPUNCTURE: CPT

## 2020-04-21 DIAGNOSIS — Z01.84 ANTIBODY RESPONSE EXAMINATION: ICD-10-CM

## 2020-04-23 ENCOUNTER — LAB VISIT (OUTPATIENT)
Dept: LAB | Facility: HOSPITAL | Age: 60
End: 2020-04-23
Attending: INTERNAL MEDICINE
Payer: COMMERCIAL

## 2020-04-23 DIAGNOSIS — Z01.84 ANTIBODY RESPONSE EXAMINATION: ICD-10-CM

## 2020-04-23 LAB — SARS-COV-2 IGG SERPL QL IA: NEGATIVE

## 2020-04-23 PROCEDURE — 86769 SARS-COV-2 COVID-19 ANTIBODY: CPT

## 2020-04-23 PROCEDURE — 36415 COLL VENOUS BLD VENIPUNCTURE: CPT

## 2020-06-29 NOTE — TELEPHONE ENCOUNTER
I sent a ciprofloxacin prescription to her [facility].  I placed orders for urinalysis and culture.  If at all possible she should submit a urine specimen for UA and culture prior to starting the antibiotics.  Thank you  
Patient having UTI symptoms, burning and frequency. Would you call in a prescription for her?   
[Nl] : Integumentary

## 2020-08-06 ENCOUNTER — PATIENT OUTREACH (OUTPATIENT)
Dept: OTHER | Facility: OTHER | Age: 60
End: 2020-08-06

## 2020-08-06 NOTE — LETTER
August 6, 2020     Esther Adams  [address]  [place] [address]       Dear Esther,    Welcome to [facility]! Our goal is to make care effective, proactive and convenient by using data you send us from home to better treat your chronic conditions.              My name is Tkeyah Bazin, and I am your dedicated Digital Medicine clinician. As an expert in medication management, I will help ensure that the medications you are taking continue to provide the intended benefits and help you reach your goals. You can reach me directly at [phone] or by sending me a message directly through your [facility] account.      I am Tone Bloom and I will be your health . My job is to help you identify lifestyle changes to improve your disease control. We will talk about nutrition, exercise, and other ways you may be able to adjust your current habits to better your health. Additionally, we will help ensure you are completing the tests and screenings that are necessary to help manage your conditions. You can reach me directly at [phone] or by sending me a message directly through your [facility] account.    Most importantly, YOU are at the center of this team. Together, we will work to improve your overall health and encourage you to meet your goals for a healthier lifestyle.     What we expect from YOU:  · Please take frequent home blood pressure measurements. We ask that you take at least 1 blood pressure reading per week, but more information will better help us get you know you. Be sure you rest for a few minutes before taking the reading in a quiet, comfortable place.     Be available to receive phone calls or [employer] messages, when appropriate, from your care team. Please let us know if there are any specific days or times that work best for us to reach you via phone.     Complete routine tests and screenings. Dont worry, we will help keep you on track!           What you should expect  from your Digital Medicine Care Team:   We will work with you to create a personalized plan of care and provide you with encouragement and education, including regarding lifestyle changes, that could help you manage your disease states.     We will adjust your current medications, if needed, and continue to monitor your long-term progress.     We will provide you and your physician with monthly progress reports after you have been in the program for more than 30 days.     We will send you reminders through [employer] and text messages to help ensure you do not miss any testing deadlines to help manage your disease states.    You will be able to reach us by phone or through your [facility] account by clicking our names under Care Team on the right side of the home screen.    I look forward to working with you to achieve your blood pressure goals!    We look forward to working with you to help manage your health,    Sincerely,    Your Digital Medicine Team    Please visit our websites to learn more:   · Hypertension: [email or web address]      Remember, we are not available for emergencies. If you have an emergency, please contact your doctors office directly or call [facility] ([phone] or [phone]) or 911.

## 2020-08-06 NOTE — PROGRESS NOTES
Digital Medicine: Health  Introduction    Introduced Esther Adams to Digital Medicine. Discussed health  role and recommended lifestyle modifications.    The history is provided by the patient.               HYPERTENSION  Explained hypertension digital medicine goals including BP goal less than or equal to 130/80mmHg, improved convenience of BP management and reduced risk of heart attack, kidney failure, stroke, eye disease, dementia, and death.      Explained non-pharmacologic therapies like low salt diet and physical activity can reduce blood pressure. .      Explained that we expect patient to submit several blood pressure readings per week at random times of the day, but at least 30 minutes after taking blood pressure medications. Instructed patient not to allow anyone else to use their blood pressure monitor and phone as data submitted is directly entered into medical record. Reviewed and confirmed appropriate blood pressure monitoring technique.         Patient's BP goal is less than or equal to 130/80.Patient's BP average is 115/68 mmHg, which is at goal, per 2017 ACC/AHA Hypertension Guidelines.    Explained to the patient that the Digital Medicine team is not available for emergencies. Advised patient call [facility] ([phone] or [phone]) or 911 if needed.               Diet-Not assessed          Physical Activity-Not assessed    Medication Adherence-Medication Adherence not addressed.      Substance, Sleep, Stress-Not assessed      Reviewed Device Techniques.     Addressed any questions or concerns and patient has my contact information if needed prior to next outreach. Patient verbalizes understanding.      Explained the importance of self-monitoring and medication adherence. Encouraged the patient to communicate with their health  for lifestyle modifications to help improve or maintain a healthy lifestyle.        Explained to the patient that the Digital Medicine team  is not available for emergencies. Advised patient call [facility] ([phone] or [phone]) or 911 if needed.       There are no preventive care reminders to display for this patient.    Last 5 Patient Entered Readings                                      Current 30 Day Average: 115/68     Recent Readings 8/5/2020 8/5/2020    SBP (mmHg) 115 111    DBP (mmHg) 66 70    Pulse 98 99

## 2020-08-18 ENCOUNTER — PATIENT OUTREACH (OUTPATIENT)
Dept: OTHER | Facility: OTHER | Age: 60
End: 2020-08-18

## 2020-08-25 ENCOUNTER — PATIENT OUTREACH (OUTPATIENT)
Dept: OTHER | Facility: OTHER | Age: 60
End: 2020-08-25

## 2020-08-25 NOTE — PROGRESS NOTES
Digital Medicine: Health  Follow-Up    The history is provided by the patient.             Reason for review: Blood pressure at goal        Topics Covered on Call: physical activity and Diet    Additional Follow-up details: BP average below goal at 119/69. Patient feels well.        Diet-Change  No 24 hour dietary recall    Additional diet details: Ms. Adams does not diet because she has not had success with this in the past. She tries to eat a good balance of fruits and vegetables. She eats salads often. She admits to eating junk foods but not often.    Physical Activity-Change      Additional physical activity details: Ms. Adams used to go to the gym at least twice a week. She reports she has not been back because of concerns with COVID-19. She moves around as much as she can at work. Ms. Adams babysits her grand kids on the weekend and stays active with them.       Medication Adherence-Medication adherence was asssessed.    Patient reported missing medication: never.        Patient takes medications in morning.    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       Addressed any questions or concerns and patient has my contact information if needed prior to next outreach. Patient verbalizes understanding.          There are no preventive care reminders to display for this patient.    Last 5 Patient Entered Readings                                      Current 30 Day Average: 119/69     Recent Readings 8/24/2020 8/19/2020 8/17/2020 8/14/2020 8/14/2020    SBP (mmHg) 127 114 112 101 101    DBP (mmHg) 75 61 70 57 53    Pulse 99 79 93 84 88

## 2020-08-27 NOTE — PROGRESS NOTES
Digital Medicine: Clinician Introduction    Esther Adams is a 60 y.o. female who is newly enrolled in the Digital Medicine Clinic.    Spoke with patient regarding intro to [facility]. Patient takes BP medications in the morning and usually checks readings in the afternoon.    The history is provided by the patient.      Review of patient's allergies indicates:   -- Morphine     --  Other reaction(s): Itching  Completed Medication Reconciliation  Verified pharmacy information.    HYPERTENSION  Explained hypertension digital medicine goals including BP goal less than or equal to 130/80mmHg, improved convenience of BP management and reduced risk of heart attack, kidney failure, stroke, eye disease, dementia, and death.     Explained non-pharmacologic therapies like low salt diet and physical activity can reduce blood pressure.       Explained that we expect patient to submit several blood pressure readings per week at random times of the day, but at least 30 minutes after taking blood pressure medications. Instructed patient not to allow anyone else to use their blood pressure monitor and phone as data submitted is directly entered into medical record. Reviewed and confirmed appropriate blood pressure monitoring technique.         Reviewed signs/symptoms of hypertension (headache, changes in vision, chest pain, shortness of breath)   Reviewed signs/symptoms of hypotension (lightheaded, dizziness, weakness)     Patient's BP goal is less than or equal to 130/80. Patients BP average is 119/69 mmHg, which is at goal, per 2017 ACC/AHA Hypertension Guidelines.       Last 5 Patient Entered Readings                                      Current 30 Day Average: 119/69     Recent Readings 8/24/2020 8/19/2020 8/17/2020 8/14/2020 8/14/2020    SBP (mmHg) 127 114 112 101 101    DBP (mmHg) 75 61 70 57 53    Pulse 99 79 93 84 88              Depression Screening  Esther Adams did not answer the depression screening.     Sleep  Apnea Screening  Patient not previously diagnosed with KENDELL       Medication Affordability Screening  Patient did not answer the medication affordability questionnaires. Patient is currently not having problems affording medications    Medication Adherence-Medication adherence was assessed.  Patient continue taking medication as prescribed.            ASSESSMENT(S)  Patients BP average is 119/69 mmHg, which is at goal. Patient's BP goal is less than or equal to 130/80 per 2017 ACC/AHA Hypertension Guidelines.      Hypertension Plan  Continue current therapy.  Provided patient education.       Addressed any questions or concerns and patient has my contact information if needed prior to next outreach. Patient verbalizes understanding.      Explained the importance of self-monitoring and medication adherence. Encouraged the patient to communicate with their health  for lifestyle modifications to help improve or maintain a healthy lifestyle.        Sent link to [facility] Medicine webpages and my contact information via [employer] for future questions.        Explained to the patient that the Digital Medicine team is not available for emergencies. Advised patient call [facility] ([phone] or [phone]) or [phone] if needed.         There are no preventive care reminders to display for this patient.    Current Medication Regimen:  Hypertension Medications             hydroCHLOROthiazide (HYDRODIURIL) 25 MG tablet Take 1 tablet (25 mg total) by mouth once daily.    lisinopriL (PRINIVIL,ZESTRIL) 20 MG tablet Take 1 tablet (20 mg total) by mouth once daily.

## 2020-09-22 ENCOUNTER — PATIENT OUTREACH (OUTPATIENT)
Dept: OTHER | Facility: OTHER | Age: 60
End: 2020-09-22

## 2020-10-08 ENCOUNTER — PATIENT MESSAGE (OUTPATIENT)
Dept: FAMILY MEDICINE | Facility: CLINIC | Age: 60
End: 2020-10-08

## 2020-10-09 DIAGNOSIS — E87.6 HYPOKALEMIA: ICD-10-CM

## 2020-10-09 DIAGNOSIS — I10 BENIGN ESSENTIAL HYPERTENSION: ICD-10-CM

## 2020-10-09 DIAGNOSIS — Z12.31 ENCOUNTER FOR SCREENING MAMMOGRAM FOR BREAST CANCER: Primary | ICD-10-CM

## 2020-10-09 RX ORDER — POTASSIUM CHLORIDE 750 MG/1
10 TABLET, EXTENDED RELEASE ORAL DAILY
Qty: 90 TABLET | Refills: 1 | Status: SHIPPED | OUTPATIENT
Start: 2020-10-09 | End: 2021-05-11 | Stop reason: SDUPTHER

## 2020-10-13 NOTE — PROGRESS NOTES
Digital Medicine: Health  Follow-Up    The history is provided by the patient.             Reason for review: Blood pressure not at goal      Additional Follow-up details: Feeling well. Average near goal at 133/80. Annual physical scheduled at the end of November. Reports having a migraine day after high BP. Usually knows when her migraines are coming but did not expect this one.             Diet-no change to diet    No change to diet.        Physical Activity-no change to routine  No change to exercise routine.     Medication Adherence-Medication adherence was assessed.      Substance, Sleep, Stress-Not assessed         Addressed patient questions and patient has my contact information if needed prior to next outreach. Patient verbalizes understanding.             There are no preventive care reminders to display for this patient.    Last 5 Patient Entered Readings                                      Current 30 Day Average: 133/80     Recent Readings 10/10/2020 10/8/2020 10/8/2020 10/4/2020 9/30/2020    SBP (mmHg) 124 120 124 145 158    DBP (mmHg) 78 71 94 86 87    Pulse 87 90 95 71 104

## 2020-10-19 ENCOUNTER — HOSPITAL ENCOUNTER (OUTPATIENT)
Dept: RADIOLOGY | Facility: HOSPITAL | Age: 60
Discharge: HOME | End: 2020-10-19
Attending: FAMILY MEDICINE
Payer: COMMERCIAL

## 2020-10-19 DIAGNOSIS — Z12.31 ENCOUNTER FOR SCREENING MAMMOGRAM FOR BREAST CANCER: ICD-10-CM

## 2020-10-19 PROCEDURE — 77063 MAMMO DIGITAL SCREENING BILAT WITH TOMO: ICD-10-PCS | Mod: 26,,, | Performed by: RADIOLOGY

## 2020-10-19 PROCEDURE — 77063 BREAST TOMOSYNTHESIS BI: CPT | Mod: 26,,, | Performed by: RADIOLOGY

## 2020-10-19 PROCEDURE — 77067 SCR MAMMO BI INCL CAD: CPT | Mod: 26,,, | Performed by: RADIOLOGY

## 2020-10-19 PROCEDURE — 77067 SCR MAMMO BI INCL CAD: CPT | Mod: TC

## 2020-10-19 PROCEDURE — 77067 MAMMO DIGITAL SCREENING BILAT WITH TOMO: ICD-10-PCS | Mod: 26,,, | Performed by: RADIOLOGY

## 2020-10-22 ENCOUNTER — PATIENT OUTREACH (OUTPATIENT)
Dept: OTHER | Facility: OTHER | Age: 60
End: 2020-10-22

## 2020-10-22 ENCOUNTER — PATIENT MESSAGE (OUTPATIENT)
Dept: OTHER | Facility: OTHER | Age: 60
End: 2020-10-22

## 2020-10-22 DIAGNOSIS — I10 BENIGN ESSENTIAL HYPERTENSION: ICD-10-CM

## 2020-10-22 NOTE — PROGRESS NOTES
Digital Medicine: Clinician Follow-Up    Called patient to review HDMP readings and assess need for refills.     The history is provided by the patient.   Follow-up reason(s): routine follow up.     Hypertension    Readings are trending down due to medication adherence.       Additional Follow-up details: Patient states she is doing well at this time. She reports she was in contact with someone from Dr. Perez's office for medication refills since her annual visit is not until March. Patient knows to reach out to me if refills are needed.      Last 5 Patient Entered Readings                                      Current 30 Day Average: 132/78     Recent Readings 10/21/2020 10/19/2020 10/18/2020 10/10/2020 10/8/2020    SBP (mmHg) 129 116 131 124 120    DBP (mmHg) 71 70 76 78 71    Pulse 126 104 115 87 90          Depression Screening  Did not address depression screening.    Sleep Apnea Screening    Did not address sleep apnea screening.     Medication Affordability Screening  Did not address medication affordability screening.     Medication Adherence-Medication adherence was assessed.          ASSESSMENT(S)  Patients BP average is 132/78 mmHg, which is at goal. Patient's BP goal is less than or equal to 130/80.    Hypertension Plan  Continue current therapy.  Provided patient education.       Addressed patient questions and patient has my contact information if needed prior to next outreach. Patient verbalizes understanding.             There are no preventive care reminders to display for this patient.  There are no preventive care reminders to display for this patient.      Hypertension Medications             hydroCHLOROthiazide (HYDRODIURIL) 25 MG tablet Take 1 tablet (25 mg total) by mouth once daily.    lisinopriL (PRINIVIL,ZESTRIL) 20 MG tablet Take 1 tablet (20 mg total) by mouth once daily.

## 2020-10-23 RX ORDER — LISINOPRIL 20 MG/1
20 TABLET ORAL DAILY
Qty: 90 TABLET | Refills: 1 | Status: SHIPPED | OUTPATIENT
Start: 2020-10-23 | End: 2021-03-05 | Stop reason: SDUPTHER

## 2020-10-23 NOTE — PROGRESS NOTES
Received the following message from patient:    Nice talking to you today. I tried to fill the Lisinopril and system said it will need provider approval even though it appears to be ready to fill. I will need this one bc I am almost out. Thank you

## 2020-11-10 ENCOUNTER — PATIENT OUTREACH (OUTPATIENT)
Dept: OTHER | Facility: OTHER | Age: 60
End: 2020-11-10

## 2020-11-10 NOTE — PROGRESS NOTES
Digital Medicine: Health  Follow-Up    The history is provided by the patient.             Reason for review: Blood pressure at goal      Additional Follow-up details: Feeling well. Average below goal at 123/75. Relates higher reading the other day to lack of sleep. Feeling better today.             Diet-no change to diet    No change to diet.        Physical Activity-no change to routine  No change to exercise routine.     Medication Adherence-Medication Adherence not addressed.      Substance, Sleep, Stress-Not assessed         Addressed patient questions and patient has my contact information if needed prior to next outreach.        There are no preventive care reminders to display for this patient.    Last 5 Patient Entered Readings                                      Current 30 Day Average: 123/75     Recent Readings 11/9/2020 11/9/2020 10/30/2020 10/22/2020 10/21/2020    SBP (mmHg) 127 139 122 110 129    DBP (mmHg) 86 88 73 64 71    Pulse 104 117 108 96 126

## 2020-11-24 ENCOUNTER — PATIENT MESSAGE (OUTPATIENT)
Dept: OTHER | Facility: OTHER | Age: 60
End: 2020-11-24

## 2020-11-24 ENCOUNTER — PATIENT OUTREACH (OUTPATIENT)
Dept: OTHER | Facility: OTHER | Age: 60
End: 2020-11-24

## 2020-11-24 DIAGNOSIS — I10 BENIGN ESSENTIAL HYPERTENSION: ICD-10-CM

## 2020-11-24 RX ORDER — HYDROCHLOROTHIAZIDE 25 MG/1
25 TABLET ORAL DAILY
Qty: 90 TABLET | Refills: 1 | Status: SHIPPED | OUTPATIENT
Start: 2020-11-24 | End: 2021-03-05 | Stop reason: SDUPTHER

## 2020-11-24 NOTE — PROGRESS NOTES
Received the following message from patient:    I requested a refill for HCTZ, but my prescription is . I could not get my well appointment with provider till 2021, so will need an approval. Thank you

## 2020-12-15 DIAGNOSIS — E78.5 HYPERLIPIDEMIA, UNSPECIFIED HYPERLIPIDEMIA TYPE: ICD-10-CM

## 2020-12-15 DIAGNOSIS — G43.909 MIGRAINE SYNDROME: ICD-10-CM

## 2020-12-15 RX ORDER — AMITRIPTYLINE HYDROCHLORIDE 50 MG/1
TABLET, FILM COATED ORAL
Qty: 180 TABLET | Refills: 3 | Status: CANCELLED | OUTPATIENT
Start: 2020-12-15

## 2020-12-15 RX ORDER — SIMVASTATIN 20 MG/1
20 TABLET, FILM COATED ORAL DAILY
Qty: 90 TABLET | Refills: 4 | Status: CANCELLED | OUTPATIENT
Start: 2020-12-15

## 2020-12-16 ENCOUNTER — IMMUNIZATION (OUTPATIENT)
Dept: INTERNAL MEDICINE | Facility: CLINIC | Age: 60
End: 2020-12-16
Payer: COMMERCIAL

## 2020-12-16 DIAGNOSIS — Z23 NEED FOR VACCINATION: ICD-10-CM

## 2020-12-16 PROCEDURE — 0001A COVID-19, MRNA, LNP-S, PF, 30 MCG/0.3 ML DOSE VACCINE: CPT | Mod: CV19,,, | Performed by: FAMILY MEDICINE

## 2020-12-16 PROCEDURE — 91300 COVID-19, MRNA, LNP-S, PF, 30 MCG/0.3 ML DOSE VACCINE: ICD-10-PCS | Mod: ,,, | Performed by: FAMILY MEDICINE

## 2020-12-16 PROCEDURE — 0001A COVID-19, MRNA, LNP-S, PF, 30 MCG/0.3 ML DOSE VACCINE: ICD-10-PCS | Mod: CV19,,, | Performed by: FAMILY MEDICINE

## 2020-12-16 PROCEDURE — 91300 COVID-19, MRNA, LNP-S, PF, 30 MCG/0.3 ML DOSE VACCINE: CPT | Mod: ,,, | Performed by: FAMILY MEDICINE

## 2020-12-17 ENCOUNTER — PATIENT MESSAGE (OUTPATIENT)
Dept: FAMILY MEDICINE | Facility: CLINIC | Age: 60
End: 2020-12-17

## 2020-12-17 DIAGNOSIS — E78.5 HYPERLIPIDEMIA, UNSPECIFIED HYPERLIPIDEMIA TYPE: ICD-10-CM

## 2020-12-17 DIAGNOSIS — G43.909 MIGRAINE SYNDROME: ICD-10-CM

## 2020-12-17 RX ORDER — AMITRIPTYLINE HYDROCHLORIDE 50 MG/1
TABLET, FILM COATED ORAL
Qty: 180 TABLET | Refills: 0 | Status: SHIPPED | OUTPATIENT
Start: 2020-12-17 | End: 2021-05-27 | Stop reason: SDUPTHER

## 2020-12-17 RX ORDER — SIMVASTATIN 20 MG/1
20 TABLET, FILM COATED ORAL DAILY
Qty: 90 TABLET | Refills: 0 | Status: SHIPPED | OUTPATIENT
Start: 2020-12-17 | End: 2021-04-21 | Stop reason: SDUPTHER

## 2020-12-22 ENCOUNTER — PATIENT OUTREACH (OUTPATIENT)
Dept: OTHER | Facility: OTHER | Age: 60
End: 2020-12-22

## 2020-12-22 NOTE — PROGRESS NOTES
Digital Medicine: Health  Follow-Up    The history is provided by the patient.             Reason for review: Blood pressure at goal      Additional Follow-up details: Patient reports feeling well. Takes medicine as prescribe. Upcoming doctor appointment in March.             Diet-Not assessed          Physical Activity-Not assessed    Medication Adherence-Medication Adherence not addressed.      Substance, Sleep, Stress-Not assessed         Addressed patient questions and patient has my contact information if needed prior to next outreach.        There are no preventive care reminders to display for this patient.    Last 5 Patient Entered Readings                                      Current 30 Day Average: 124/77     Recent Readings 12/13/2020 12/4/2020 12/1/2020 11/22/2020 11/17/2020    SBP (mmHg) 137 126 123 109 124    DBP (mmHg) 73 78 74 82 71    Pulse 88 97 95 112 82

## 2021-01-04 ENCOUNTER — PATIENT MESSAGE (OUTPATIENT)
Dept: ADMINISTRATIVE | Facility: HOSPITAL | Age: 61
End: 2021-01-04

## 2021-01-06 ENCOUNTER — IMMUNIZATION (OUTPATIENT)
Dept: INTERNAL MEDICINE | Facility: CLINIC | Age: 61
End: 2021-01-06
Payer: COMMERCIAL

## 2021-01-06 DIAGNOSIS — Z23 NEED FOR VACCINATION: ICD-10-CM

## 2021-01-06 PROCEDURE — 0002A COVID-19, MRNA, LNP-S, PF, 30 MCG/0.3 ML DOSE VACCINE: CPT | Mod: PBBFAC | Performed by: FAMILY MEDICINE

## 2021-01-06 PROCEDURE — 91300 COVID-19, MRNA, LNP-S, PF, 30 MCG/0.3 ML DOSE VACCINE: CPT | Mod: PBBFAC | Performed by: FAMILY MEDICINE

## 2021-03-05 ENCOUNTER — OFFICE VISIT (OUTPATIENT)
Dept: FAMILY MEDICINE | Facility: CLINIC | Age: 61
End: 2021-03-05
Payer: COMMERCIAL

## 2021-03-05 ENCOUNTER — LAB VISIT (OUTPATIENT)
Dept: LAB | Facility: HOSPITAL | Age: 61
End: 2021-03-05
Attending: FAMILY MEDICINE
Payer: COMMERCIAL

## 2021-03-05 VITALS
HEART RATE: 88 BPM | SYSTOLIC BLOOD PRESSURE: 138 MMHG | BODY MASS INDEX: 33.15 KG/M2 | DIASTOLIC BLOOD PRESSURE: 74 MMHG | WEIGHT: 180.13 LBS | HEIGHT: 62 IN | OXYGEN SATURATION: 97 %

## 2021-03-05 DIAGNOSIS — Z51.81 ENCOUNTER FOR MONITORING LONG-TERM PROTON PUMP INHIBITOR THERAPY: ICD-10-CM

## 2021-03-05 DIAGNOSIS — Z00.00 ROUTINE GENERAL MEDICAL EXAMINATION AT A HEALTH CARE FACILITY: Primary | ICD-10-CM

## 2021-03-05 DIAGNOSIS — K21.9 GASTROESOPHAGEAL REFLUX DISEASE, UNSPECIFIED WHETHER ESOPHAGITIS PRESENT: ICD-10-CM

## 2021-03-05 DIAGNOSIS — G43.909 MIGRAINE SYNDROME: ICD-10-CM

## 2021-03-05 DIAGNOSIS — Z90.710 S/P TAH-BSO (TOTAL ABDOMINAL HYSTERECTOMY AND BILATERAL SALPINGO-OOPHORECTOMY): ICD-10-CM

## 2021-03-05 DIAGNOSIS — Z79.82 LONG-TERM USE OF ASPIRIN THERAPY: ICD-10-CM

## 2021-03-05 DIAGNOSIS — E55.9 VITAMIN D INSUFFICIENCY: ICD-10-CM

## 2021-03-05 DIAGNOSIS — Z79.899 ENCOUNTER FOR MONITORING LONG-TERM PROTON PUMP INHIBITOR THERAPY: ICD-10-CM

## 2021-03-05 DIAGNOSIS — I10 BENIGN ESSENTIAL HYPERTENSION: ICD-10-CM

## 2021-03-05 DIAGNOSIS — Z23 NEED FOR SHINGLES VACCINE: ICD-10-CM

## 2021-03-05 DIAGNOSIS — R76.8 POSITIVE HEPATITIS C ANTIBODY TEST: ICD-10-CM

## 2021-03-05 DIAGNOSIS — Z00.00 ROUTINE GENERAL MEDICAL EXAMINATION AT A HEALTH CARE FACILITY: ICD-10-CM

## 2021-03-05 DIAGNOSIS — K59.00 CONSTIPATION, UNSPECIFIED CONSTIPATION TYPE: ICD-10-CM

## 2021-03-05 DIAGNOSIS — N95.2 POST-MENOPAUSAL ATROPHIC VAGINITIS: ICD-10-CM

## 2021-03-05 DIAGNOSIS — E66.09 CLASS 1 OBESITY DUE TO EXCESS CALORIES WITH SERIOUS COMORBIDITY AND BODY MASS INDEX (BMI) OF 32.0 TO 32.9 IN ADULT: ICD-10-CM

## 2021-03-05 DIAGNOSIS — Z79.899 MEDICATION MANAGEMENT: ICD-10-CM

## 2021-03-05 DIAGNOSIS — Z90.79 S/P TAH-BSO (TOTAL ABDOMINAL HYSTERECTOMY AND BILATERAL SALPINGO-OOPHORECTOMY): ICD-10-CM

## 2021-03-05 DIAGNOSIS — Z90.722 S/P TAH-BSO (TOTAL ABDOMINAL HYSTERECTOMY AND BILATERAL SALPINGO-OOPHORECTOMY): ICD-10-CM

## 2021-03-05 DIAGNOSIS — E78.5 HYPERLIPIDEMIA, UNSPECIFIED HYPERLIPIDEMIA TYPE: ICD-10-CM

## 2021-03-05 LAB
25(OH)D3+25(OH)D2 SERPL-MCNC: 50 NG/ML (ref 30–96)
ALBUMIN SERPL BCP-MCNC: 4 G/DL (ref 3.5–5.2)
ALP SERPL-CCNC: 76 U/L (ref 55–135)
ALT SERPL W/O P-5'-P-CCNC: 38 U/L (ref 10–44)
ANION GAP SERPL CALC-SCNC: 12 MMOL/L (ref 8–16)
AST SERPL-CCNC: 26 U/L (ref 10–40)
BASOPHILS # BLD AUTO: 0.09 K/UL (ref 0–0.2)
BASOPHILS NFR BLD: 1 % (ref 0–1.9)
BILIRUB SERPL-MCNC: 0.4 MG/DL (ref 0.1–1)
BUN SERPL-MCNC: 16 MG/DL (ref 8–23)
CALCIUM SERPL-MCNC: 8.8 MG/DL (ref 8.7–10.5)
CHLORIDE SERPL-SCNC: 103 MMOL/L (ref 95–110)
CHOLEST SERPL-MCNC: 135 MG/DL (ref 120–199)
CHOLEST/HDLC SERPL: 3.4 {RATIO} (ref 2–5)
CO2 SERPL-SCNC: 26 MMOL/L (ref 23–29)
CREAT SERPL-MCNC: 1 MG/DL (ref 0.5–1.4)
DIFFERENTIAL METHOD: ABNORMAL
EOSINOPHIL # BLD AUTO: 0.3 K/UL (ref 0–0.5)
EOSINOPHIL NFR BLD: 2.8 % (ref 0–8)
ERYTHROCYTE [DISTWIDTH] IN BLOOD BY AUTOMATED COUNT: 12.4 % (ref 11.5–14.5)
EST. GFR  (AFRICAN AMERICAN): >60 ML/MIN/1.73 M^2
EST. GFR  (NON AFRICAN AMERICAN): >60 ML/MIN/1.73 M^2
ESTIMATED AVG GLUCOSE: 120 MG/DL (ref 68–131)
GLUCOSE SERPL-MCNC: 112 MG/DL (ref 70–110)
HBA1C MFR BLD: 5.8 % (ref 4–5.6)
HCT VFR BLD AUTO: 38.7 % (ref 37–48.5)
HDLC SERPL-MCNC: 40 MG/DL (ref 40–75)
HDLC SERPL: 29.6 % (ref 20–50)
HGB BLD-MCNC: 12.9 G/DL (ref 12–16)
IMM GRANULOCYTES # BLD AUTO: 0.04 K/UL (ref 0–0.04)
IMM GRANULOCYTES NFR BLD AUTO: 0.4 % (ref 0–0.5)
LDLC SERPL CALC-MCNC: 73 MG/DL (ref 63–159)
LYMPHOCYTES # BLD AUTO: 2.7 K/UL (ref 1–4.8)
LYMPHOCYTES NFR BLD: 29 % (ref 18–48)
MAGNESIUM SERPL-MCNC: 2 MG/DL (ref 1.6–2.6)
MCH RBC QN AUTO: 29.9 PG (ref 27–31)
MCHC RBC AUTO-ENTMCNC: 33.3 G/DL (ref 32–36)
MCV RBC AUTO: 90 FL (ref 82–98)
MONOCYTES # BLD AUTO: 0.4 K/UL (ref 0.3–1)
MONOCYTES NFR BLD: 3.8 % (ref 4–15)
NEUTROPHILS # BLD AUTO: 5.9 K/UL (ref 1.8–7.7)
NEUTROPHILS NFR BLD: 63 % (ref 38–73)
NONHDLC SERPL-MCNC: 95 MG/DL
NRBC BLD-RTO: 0 /100 WBC
PLATELET # BLD AUTO: 372 K/UL (ref 150–350)
PMV BLD AUTO: 9.4 FL (ref 9.2–12.9)
POTASSIUM SERPL-SCNC: 3.9 MMOL/L (ref 3.5–5.1)
PROT SERPL-MCNC: 7.2 G/DL (ref 6–8.4)
RBC # BLD AUTO: 4.32 M/UL (ref 4–5.4)
SODIUM SERPL-SCNC: 141 MMOL/L (ref 136–145)
TRIGL SERPL-MCNC: 110 MG/DL (ref 30–150)
TSH SERPL DL<=0.005 MIU/L-ACNC: 0.69 UIU/ML (ref 0.4–4)
VIT B12 SERPL-MCNC: 383 PG/ML (ref 210–950)
WBC # BLD AUTO: 9.37 K/UL (ref 3.9–12.7)

## 2021-03-05 PROCEDURE — 99999 PR PBB SHADOW E&M-EST. PATIENT-LVL IV: ICD-10-PCS | Mod: PBBFAC,,, | Performed by: FAMILY MEDICINE

## 2021-03-05 PROCEDURE — 1126F AMNT PAIN NOTED NONE PRSNT: CPT | Mod: S$GLB,,, | Performed by: FAMILY MEDICINE

## 2021-03-05 PROCEDURE — 80053 COMPREHEN METABOLIC PANEL: CPT | Performed by: FAMILY MEDICINE

## 2021-03-05 PROCEDURE — 82306 VITAMIN D 25 HYDROXY: CPT | Performed by: FAMILY MEDICINE

## 2021-03-05 PROCEDURE — 83735 ASSAY OF MAGNESIUM: CPT | Performed by: FAMILY MEDICINE

## 2021-03-05 PROCEDURE — 99396 PREV VISIT EST AGE 40-64: CPT | Mod: S$GLB,,, | Performed by: FAMILY MEDICINE

## 2021-03-05 PROCEDURE — 36415 COLL VENOUS BLD VENIPUNCTURE: CPT | Performed by: FAMILY MEDICINE

## 2021-03-05 PROCEDURE — 3075F SYST BP GE 130 - 139MM HG: CPT | Mod: CPTII,S$GLB,, | Performed by: FAMILY MEDICINE

## 2021-03-05 PROCEDURE — 84443 ASSAY THYROID STIM HORMONE: CPT | Performed by: FAMILY MEDICINE

## 2021-03-05 PROCEDURE — 99999 PR PBB SHADOW E&M-EST. PATIENT-LVL IV: CPT | Mod: PBBFAC,,, | Performed by: FAMILY MEDICINE

## 2021-03-05 PROCEDURE — 3075F PR MOST RECENT SYSTOLIC BLOOD PRESS GE 130-139MM HG: ICD-10-PCS | Mod: CPTII,S$GLB,, | Performed by: FAMILY MEDICINE

## 2021-03-05 PROCEDURE — 85025 COMPLETE CBC W/AUTO DIFF WBC: CPT | Performed by: FAMILY MEDICINE

## 2021-03-05 PROCEDURE — 99396 PR PREVENTIVE VISIT,EST,40-64: ICD-10-PCS | Mod: S$GLB,,, | Performed by: FAMILY MEDICINE

## 2021-03-05 PROCEDURE — 86703 HIV-1/HIV-2 1 RESULT ANTBDY: CPT | Performed by: FAMILY MEDICINE

## 2021-03-05 PROCEDURE — 80061 LIPID PANEL: CPT | Performed by: FAMILY MEDICINE

## 2021-03-05 PROCEDURE — 82607 VITAMIN B-12: CPT | Performed by: FAMILY MEDICINE

## 2021-03-05 PROCEDURE — 3008F BODY MASS INDEX DOCD: CPT | Mod: CPTII,S$GLB,, | Performed by: FAMILY MEDICINE

## 2021-03-05 PROCEDURE — 3078F DIAST BP <80 MM HG: CPT | Mod: CPTII,S$GLB,, | Performed by: FAMILY MEDICINE

## 2021-03-05 PROCEDURE — 3078F PR MOST RECENT DIASTOLIC BLOOD PRESSURE < 80 MM HG: ICD-10-PCS | Mod: CPTII,S$GLB,, | Performed by: FAMILY MEDICINE

## 2021-03-05 PROCEDURE — 83036 HEMOGLOBIN GLYCOSYLATED A1C: CPT | Performed by: FAMILY MEDICINE

## 2021-03-05 PROCEDURE — 3008F PR BODY MASS INDEX (BMI) DOCUMENTED: ICD-10-PCS | Mod: CPTII,S$GLB,, | Performed by: FAMILY MEDICINE

## 2021-03-05 PROCEDURE — 1126F PR PAIN SEVERITY QUANTIFIED, NO PAIN PRESENT: ICD-10-PCS | Mod: S$GLB,,, | Performed by: FAMILY MEDICINE

## 2021-03-05 RX ORDER — HYDROCHLOROTHIAZIDE 25 MG/1
25 TABLET ORAL DAILY
Qty: 90 TABLET | Refills: 4 | Status: SHIPPED | OUTPATIENT
Start: 2021-03-05 | End: 2022-03-09 | Stop reason: SDUPTHER

## 2021-03-05 RX ORDER — BUTALBITAL, ACETAMINOPHEN AND CAFFEINE 50; 325; 40 MG/1; MG/1; MG/1
1 TABLET ORAL EVERY 4 HOURS PRN
Qty: 60 TABLET | Refills: 3 | Status: SHIPPED | OUTPATIENT
Start: 2021-03-05 | End: 2022-03-09 | Stop reason: SDUPTHER

## 2021-03-05 RX ORDER — LISINOPRIL 20 MG/1
20 TABLET ORAL DAILY
Qty: 90 TABLET | Refills: 4 | Status: SHIPPED | OUTPATIENT
Start: 2021-03-05 | End: 2022-03-09 | Stop reason: SDUPTHER

## 2021-03-05 RX ORDER — LANOLIN ALCOHOL/MO/W.PET/CERES
400 CREAM (GRAM) TOPICAL DAILY
Qty: 100 TABLET | Refills: 4 | Status: SHIPPED | OUTPATIENT
Start: 2021-03-05 | End: 2022-06-16 | Stop reason: SDUPTHER

## 2021-03-08 LAB — HIV 1+2 AB+HIV1 P24 AG SERPL QL IA: NEGATIVE

## 2021-04-20 ENCOUNTER — CLINICAL SUPPORT (OUTPATIENT)
Dept: OTHER | Facility: CLINIC | Age: 61
End: 2021-04-20
Payer: COMMERCIAL

## 2021-04-20 DIAGNOSIS — Z00.8 ENCOUNTER FOR OTHER GENERAL EXAMINATION: ICD-10-CM

## 2021-04-21 ENCOUNTER — TELEPHONE (OUTPATIENT)
Dept: INTERNAL MEDICINE | Facility: CLINIC | Age: 61
End: 2021-04-21

## 2021-04-21 VITALS — HEIGHT: 62 IN | BODY MASS INDEX: 32.94 KG/M2

## 2021-04-21 DIAGNOSIS — E78.5 HYPERLIPIDEMIA, UNSPECIFIED HYPERLIPIDEMIA TYPE: ICD-10-CM

## 2021-04-21 LAB
GLUCOSE SERPL-MCNC: 92 MG/DL (ref 60–140)
HDLC SERPL-MCNC: 53 MG/DL
POC CHOLESTEROL, LDL (DOCK): 154 MG/DL
POC CHOLESTEROL, TOTAL: 228 MG/DL
TRIGL SERPL-MCNC: 104 MG/DL

## 2021-04-21 RX ORDER — SIMVASTATIN 20 MG/1
20 TABLET, FILM COATED ORAL DAILY
Qty: 90 TABLET | Refills: 4 | Status: SHIPPED | OUTPATIENT
Start: 2021-04-21 | End: 2022-03-09 | Stop reason: SDUPTHER

## 2021-05-11 DIAGNOSIS — I10 BENIGN ESSENTIAL HYPERTENSION: ICD-10-CM

## 2021-05-11 DIAGNOSIS — E87.6 HYPOKALEMIA: ICD-10-CM

## 2021-05-11 RX ORDER — POTASSIUM CHLORIDE 750 MG/1
10 TABLET, EXTENDED RELEASE ORAL DAILY
Qty: 90 TABLET | Refills: 4 | Status: SHIPPED | OUTPATIENT
Start: 2021-05-11 | End: 2022-06-16 | Stop reason: SDUPTHER

## 2021-05-11 RX ORDER — POTASSIUM CHLORIDE 750 MG/1
10 TABLET, EXTENDED RELEASE ORAL DAILY
Qty: 90 TABLET | Refills: 1 | Status: CANCELLED | OUTPATIENT
Start: 2021-05-11

## 2021-05-27 DIAGNOSIS — G43.909 MIGRAINE SYNDROME: ICD-10-CM

## 2021-05-28 RX ORDER — AMITRIPTYLINE HYDROCHLORIDE 50 MG/1
TABLET, FILM COATED ORAL
Qty: 180 TABLET | Refills: 0 | Status: SHIPPED | OUTPATIENT
Start: 2021-05-28 | End: 2021-10-05

## 2021-06-04 ENCOUNTER — OFFICE VISIT (OUTPATIENT)
Dept: INTERNAL MEDICINE | Facility: CLINIC | Age: 61
End: 2021-06-04
Payer: COMMERCIAL

## 2021-06-04 DIAGNOSIS — B96.89 ACUTE BACTERIAL SINUSITIS: Primary | ICD-10-CM

## 2021-06-04 DIAGNOSIS — J01.90 ACUTE BACTERIAL SINUSITIS: Primary | ICD-10-CM

## 2021-06-04 PROCEDURE — 99213 PR OFFICE/OUTPT VISIT, EST, LEVL III, 20-29 MIN: ICD-10-PCS | Mod: 95,,, | Performed by: INTERNAL MEDICINE

## 2021-06-04 PROCEDURE — 99213 OFFICE O/P EST LOW 20 MIN: CPT | Mod: 95,,, | Performed by: INTERNAL MEDICINE

## 2021-06-04 RX ORDER — AMOXICILLIN AND CLAVULANATE POTASSIUM 875; 125 MG/1; MG/1
1 TABLET, FILM COATED ORAL EVERY 12 HOURS
Qty: 14 TABLET | Refills: 0 | Status: SHIPPED | OUTPATIENT
Start: 2021-06-04 | End: 2021-06-11

## 2021-06-04 RX ORDER — METHYLPREDNISOLONE 4 MG/1
TABLET ORAL
Qty: 21 TABLET | Refills: 0 | Status: SHIPPED | OUTPATIENT
Start: 2021-06-04 | End: 2021-06-25

## 2021-09-10 ENCOUNTER — PATIENT MESSAGE (OUTPATIENT)
Dept: FAMILY MEDICINE | Facility: CLINIC | Age: 61
End: 2021-09-10

## 2021-09-10 ENCOUNTER — OFFICE VISIT (OUTPATIENT)
Dept: FAMILY MEDICINE | Facility: CLINIC | Age: 61
End: 2021-09-10
Payer: COMMERCIAL

## 2021-09-10 ENCOUNTER — LAB VISIT (OUTPATIENT)
Dept: INTERNAL MEDICINE | Facility: CLINIC | Age: 61
End: 2021-09-10

## 2021-09-10 DIAGNOSIS — U07.1 COVID-19 VIRUS DETECTED: ICD-10-CM

## 2021-09-10 DIAGNOSIS — B96.89 ACUTE BACTERIAL SINUSITIS: Primary | ICD-10-CM

## 2021-09-10 DIAGNOSIS — J01.90 ACUTE BACTERIAL SINUSITIS: Primary | ICD-10-CM

## 2021-09-10 DIAGNOSIS — R09.81 SINUS CONGESTION: Primary | ICD-10-CM

## 2021-09-10 DIAGNOSIS — U07.1 COVID-19 VIRUS INFECTION: Primary | ICD-10-CM

## 2021-09-10 DIAGNOSIS — R09.81 SINUS CONGESTION: ICD-10-CM

## 2021-09-10 LAB — SARS-COV-2 RDRP RESP QL NAA+PROBE: POSITIVE

## 2021-09-10 PROCEDURE — 1160F RVW MEDS BY RX/DR IN RCRD: CPT | Mod: CPTII,,, | Performed by: NURSE PRACTITIONER

## 2021-09-10 PROCEDURE — 4010F ACE/ARB THERAPY RXD/TAKEN: CPT | Mod: CPTII,,, | Performed by: NURSE PRACTITIONER

## 2021-09-10 PROCEDURE — 3044F HG A1C LEVEL LT 7.0%: CPT | Mod: CPTII,,, | Performed by: NURSE PRACTITIONER

## 2021-09-10 PROCEDURE — 1160F PR REVIEW ALL MEDS BY PRESCRIBER/CLIN PHARMACIST DOCUMENTED: ICD-10-PCS | Mod: CPTII,,, | Performed by: NURSE PRACTITIONER

## 2021-09-10 PROCEDURE — 1159F PR MEDICATION LIST DOCUMENTED IN MEDICAL RECORD: ICD-10-PCS | Mod: CPTII,,, | Performed by: NURSE PRACTITIONER

## 2021-09-10 PROCEDURE — 1159F MED LIST DOCD IN RCRD: CPT | Mod: CPTII,,, | Performed by: NURSE PRACTITIONER

## 2021-09-10 PROCEDURE — 3044F PR MOST RECENT HEMOGLOBIN A1C LEVEL <7.0%: ICD-10-PCS | Mod: CPTII,,, | Performed by: NURSE PRACTITIONER

## 2021-09-10 PROCEDURE — 99213 PR OFFICE/OUTPT VISIT, EST, LEVL III, 20-29 MIN: ICD-10-PCS | Mod: 95,,, | Performed by: NURSE PRACTITIONER

## 2021-09-10 PROCEDURE — U0002 COVID-19 LAB TEST NON-CDC: HCPCS | Performed by: PREVENTIVE MEDICINE

## 2021-09-10 PROCEDURE — 99213 OFFICE O/P EST LOW 20 MIN: CPT | Mod: 95,,, | Performed by: NURSE PRACTITIONER

## 2021-09-10 PROCEDURE — 4010F PR ACE/ARB THEARPY RXD/TAKEN: ICD-10-PCS | Mod: CPTII,,, | Performed by: NURSE PRACTITIONER

## 2021-09-10 RX ORDER — METHYLPREDNISOLONE 4 MG/1
TABLET ORAL
Qty: 21 TABLET | Refills: 0 | Status: SHIPPED | OUTPATIENT
Start: 2021-09-10 | End: 2022-03-09 | Stop reason: ALTCHOICE

## 2021-09-10 RX ORDER — BROMPHENIRAMINE MALEATE, PSEUDOEPHEDRINE HYDROCHLORIDE, AND DEXTROMETHORPHAN HYDROBROMIDE 2; 30; 10 MG/5ML; MG/5ML; MG/5ML
10 SYRUP ORAL EVERY 4 HOURS PRN
Qty: 240 ML | Refills: 0 | Status: SHIPPED | OUTPATIENT
Start: 2021-09-10 | End: 2022-09-19 | Stop reason: ALTCHOICE

## 2021-09-10 RX ORDER — AMOXICILLIN AND CLAVULANATE POTASSIUM 875; 125 MG/1; MG/1
1 TABLET, FILM COATED ORAL 2 TIMES DAILY
Qty: 20 TABLET | Refills: 0 | Status: SHIPPED | OUTPATIENT
Start: 2021-09-10 | End: 2021-09-20

## 2021-09-11 ENCOUNTER — NURSE TRIAGE (OUTPATIENT)
Dept: ADMINISTRATIVE | Facility: CLINIC | Age: 61
End: 2021-09-11

## 2021-09-14 ENCOUNTER — INFUSION (OUTPATIENT)
Dept: INFECTIOUS DISEASES | Facility: HOSPITAL | Age: 61
End: 2021-09-14
Attending: INTERNAL MEDICINE
Payer: COMMERCIAL

## 2021-09-14 VITALS
SYSTOLIC BLOOD PRESSURE: 140 MMHG | TEMPERATURE: 99 F | WEIGHT: 170 LBS | HEART RATE: 85 BPM | BODY MASS INDEX: 27.32 KG/M2 | DIASTOLIC BLOOD PRESSURE: 77 MMHG | RESPIRATION RATE: 18 BRPM | OXYGEN SATURATION: 97 % | HEIGHT: 66 IN

## 2021-09-14 DIAGNOSIS — U07.1 COVID-19 VIRUS INFECTION: Primary | ICD-10-CM

## 2021-09-14 PROCEDURE — 63600175 PHARM REV CODE 636 W HCPCS: Performed by: INTERNAL MEDICINE

## 2021-09-14 PROCEDURE — M0243 CASIRIVI AND IMDEVI INFUSION: HCPCS | Performed by: INTERNAL MEDICINE

## 2021-09-14 PROCEDURE — 25000003 PHARM REV CODE 250: Performed by: INTERNAL MEDICINE

## 2021-09-14 RX ORDER — ACETAMINOPHEN 325 MG/1
650 TABLET ORAL ONCE AS NEEDED
Status: DISCONTINUED | OUTPATIENT
Start: 2021-09-14 | End: 2022-09-19

## 2021-09-14 RX ORDER — ALBUTEROL SULFATE 90 UG/1
2 AEROSOL, METERED RESPIRATORY (INHALATION)
Status: DISCONTINUED | OUTPATIENT
Start: 2021-09-14 | End: 2022-09-19

## 2021-09-14 RX ORDER — ONDANSETRON 4 MG/1
4 TABLET, ORALLY DISINTEGRATING ORAL ONCE AS NEEDED
Status: DISCONTINUED | OUTPATIENT
Start: 2021-09-14 | End: 2022-09-19

## 2021-09-14 RX ORDER — DIPHENHYDRAMINE HYDROCHLORIDE 50 MG/ML
25 INJECTION INTRAMUSCULAR; INTRAVENOUS ONCE AS NEEDED
Status: DISCONTINUED | OUTPATIENT
Start: 2021-09-14 | End: 2022-09-19

## 2021-09-14 RX ORDER — SODIUM CHLORIDE 0.9 % (FLUSH) 0.9 %
10 SYRINGE (ML) INJECTION
Status: DISCONTINUED | OUTPATIENT
Start: 2021-09-14 | End: 2022-09-19

## 2021-09-14 RX ORDER — EPINEPHRINE 0.3 MG/.3ML
0.3 INJECTION SUBCUTANEOUS
Status: DISCONTINUED | OUTPATIENT
Start: 2021-09-14 | End: 2022-09-19

## 2021-09-14 RX ADMIN — CASIRIVIMAB AND IMDEVIMAB 600 MG: 600; 600 INJECTION, SOLUTION, CONCENTRATE INTRAVENOUS at 10:09

## 2021-09-23 ENCOUNTER — HOSPITAL ENCOUNTER (EMERGENCY)
Facility: HOSPITAL | Age: 61
Discharge: HOME | End: 2021-09-23
Attending: EMERGENCY MEDICINE
Payer: COMMERCIAL

## 2021-09-23 VITALS
OXYGEN SATURATION: 99 % | TEMPERATURE: 98 F | HEART RATE: 78 BPM | BODY MASS INDEX: 26.95 KG/M2 | SYSTOLIC BLOOD PRESSURE: 121 MMHG | DIASTOLIC BLOOD PRESSURE: 74 MMHG | RESPIRATION RATE: 16 BRPM | WEIGHT: 167 LBS

## 2021-09-23 DIAGNOSIS — S01.81XA LACERATION OF FOREHEAD, INITIAL ENCOUNTER: ICD-10-CM

## 2021-09-23 DIAGNOSIS — S09.90XA INJURY OF HEAD, INITIAL ENCOUNTER: Primary | ICD-10-CM

## 2021-09-23 PROCEDURE — 99285 EMERGENCY DEPT VISIT HI MDM: CPT | Mod: 25

## 2021-09-23 PROCEDURE — 90471 IMMUNIZATION ADMIN: CPT | Performed by: NURSE PRACTITIONER

## 2021-09-23 PROCEDURE — 90715 TDAP VACCINE 7 YRS/> IM: CPT | Performed by: NURSE PRACTITIONER

## 2021-09-23 PROCEDURE — 25000003 PHARM REV CODE 250: Performed by: NURSE PRACTITIONER

## 2021-09-23 PROCEDURE — 63600175 PHARM REV CODE 636 W HCPCS: Performed by: NURSE PRACTITIONER

## 2021-09-23 PROCEDURE — 12011 RPR F/E/E/N/L/M 2.5 CM/<: CPT

## 2021-09-23 RX ORDER — ACETAMINOPHEN 500 MG
1000 TABLET ORAL
Status: COMPLETED | OUTPATIENT
Start: 2021-09-23 | End: 2021-09-23

## 2021-09-23 RX ORDER — ONDANSETRON 4 MG/1
4 TABLET, ORALLY DISINTEGRATING ORAL
Status: COMPLETED | OUTPATIENT
Start: 2021-09-23 | End: 2021-09-23

## 2021-09-23 RX ADMIN — ACETAMINOPHEN 1000 MG: 500 TABLET ORAL at 08:09

## 2021-09-23 RX ADMIN — CLOSTRIDIUM TETANI TOXOID ANTIGEN (FORMALDEHYDE INACTIVATED), CORYNEBACTERIUM DIPHTHERIAE TOXOID ANTIGEN (FORMALDEHYDE INACTIVATED), BORDETELLA PERTUSSIS TOXOID ANTIGEN (GLUTARALDEHYDE INACTIVATED), BORDETELLA PERTUSSIS FILAMENTOUS HEMAGGLUTININ ANTIGEN (FORMALDEHYDE INACTIVATED), BORDETELLA PERTUSSIS PERTACTIN ANTIGEN, AND BORDETELLA PERTUSSIS FIMBRIAE 2/3 ANTIGEN 0.5 ML: 5; 2; 2.5; 5; 3; 5 INJECTION, SUSPENSION INTRAMUSCULAR at 08:09

## 2021-09-23 RX ADMIN — ONDANSETRON 4 MG: 4 TABLET, ORALLY DISINTEGRATING ORAL at 08:09

## 2021-10-04 DIAGNOSIS — G43.909 MIGRAINE SYNDROME: ICD-10-CM

## 2021-10-04 RX ORDER — AMITRIPTYLINE HYDROCHLORIDE 50 MG/1
TABLET, FILM COATED ORAL
Qty: 180 TABLET | Refills: 0 | Status: CANCELLED | OUTPATIENT
Start: 2021-10-04

## 2021-11-04 ENCOUNTER — TELEPHONE (OUTPATIENT)
Dept: FAMILY MEDICINE | Facility: CLINIC | Age: 61
End: 2021-11-04
Payer: COMMERCIAL

## 2021-11-04 ENCOUNTER — PATIENT MESSAGE (OUTPATIENT)
Dept: FAMILY MEDICINE | Facility: CLINIC | Age: 61
End: 2021-11-04
Payer: COMMERCIAL

## 2021-11-04 DIAGNOSIS — Z12.31 ENCOUNTER FOR SCREENING MAMMOGRAM FOR BREAST CANCER: Primary | ICD-10-CM

## 2021-11-06 ENCOUNTER — TELEPHONE (OUTPATIENT)
Dept: ADMINISTRATIVE | Facility: OTHER | Age: 61
End: 2021-11-06
Payer: COMMERCIAL

## 2021-11-30 ENCOUNTER — PATIENT MESSAGE (OUTPATIENT)
Dept: FAMILY MEDICINE | Facility: CLINIC | Age: 61
End: 2021-11-30
Payer: COMMERCIAL

## 2021-12-01 ENCOUNTER — TELEPHONE (OUTPATIENT)
Dept: FAMILY MEDICINE | Facility: CLINIC | Age: 61
End: 2021-12-01
Payer: COMMERCIAL

## 2021-12-01 DIAGNOSIS — L98.9 SKIN LESION: Primary | ICD-10-CM

## 2021-12-01 DIAGNOSIS — Z80.8 FAMILY HISTORY OF SKIN CANCER: ICD-10-CM

## 2021-12-01 DIAGNOSIS — Z12.83 SKIN EXAM, SCREENING FOR CANCER: ICD-10-CM

## 2021-12-05 ENCOUNTER — PATIENT OUTREACH (OUTPATIENT)
Dept: ADMINISTRATIVE | Facility: OTHER | Age: 61
End: 2021-12-05
Payer: COMMERCIAL

## 2021-12-06 ENCOUNTER — OFFICE VISIT (OUTPATIENT)
Dept: DERMATOLOGY | Facility: CLINIC | Age: 61
End: 2021-12-06
Payer: COMMERCIAL

## 2021-12-06 VITALS — WEIGHT: 167 LBS | BODY MASS INDEX: 26.95 KG/M2

## 2021-12-06 DIAGNOSIS — L82.1 SEBORRHEIC KERATOSES: ICD-10-CM

## 2021-12-06 DIAGNOSIS — D22.9 NEVUS OF MULTIPLE SITES: ICD-10-CM

## 2021-12-06 DIAGNOSIS — L81.4 LENTIGINES: Primary | ICD-10-CM

## 2021-12-06 DIAGNOSIS — Z12.83 SKIN EXAM, SCREENING FOR CANCER: ICD-10-CM

## 2021-12-06 DIAGNOSIS — L98.9 SKIN LESION: ICD-10-CM

## 2021-12-06 DIAGNOSIS — Z80.8 FAMILY HISTORY OF SKIN CANCER: ICD-10-CM

## 2021-12-06 PROCEDURE — 99999 PR PBB SHADOW E&M-EST. PATIENT-LVL IV: CPT | Mod: PBBFAC,,, | Performed by: DERMATOLOGY

## 2021-12-06 PROCEDURE — 4010F PR ACE/ARB THEARPY RXD/TAKEN: ICD-10-PCS | Mod: CPTII,S$GLB,, | Performed by: DERMATOLOGY

## 2021-12-06 PROCEDURE — 99202 PR OFFICE/OUTPT VISIT, NEW, LEVL II, 15-29 MIN: ICD-10-PCS | Mod: S$GLB,,, | Performed by: DERMATOLOGY

## 2021-12-06 PROCEDURE — 4010F ACE/ARB THERAPY RXD/TAKEN: CPT | Mod: CPTII,S$GLB,, | Performed by: DERMATOLOGY

## 2021-12-06 PROCEDURE — 99202 OFFICE O/P NEW SF 15 MIN: CPT | Mod: S$GLB,,, | Performed by: DERMATOLOGY

## 2021-12-06 PROCEDURE — 99999 PR PBB SHADOW E&M-EST. PATIENT-LVL IV: ICD-10-PCS | Mod: PBBFAC,,, | Performed by: DERMATOLOGY

## 2021-12-14 ENCOUNTER — HOSPITAL ENCOUNTER (OUTPATIENT)
Dept: RADIOLOGY | Facility: HOSPITAL | Age: 61
Discharge: HOME | End: 2021-12-14
Attending: FAMILY MEDICINE
Payer: COMMERCIAL

## 2021-12-14 DIAGNOSIS — Z12.31 ENCOUNTER FOR SCREENING MAMMOGRAM FOR BREAST CANCER: ICD-10-CM

## 2021-12-14 PROCEDURE — 77063 MAMMO DIGITAL SCREENING BILAT WITH TOMO: ICD-10-PCS | Mod: 26,,, | Performed by: RADIOLOGY

## 2021-12-14 PROCEDURE — 77067 SCR MAMMO BI INCL CAD: CPT | Mod: TC

## 2021-12-14 PROCEDURE — 77067 MAMMO DIGITAL SCREENING BILAT WITH TOMO: ICD-10-PCS | Mod: 26,,, | Performed by: RADIOLOGY

## 2021-12-14 PROCEDURE — 77067 SCR MAMMO BI INCL CAD: CPT | Mod: 26,,, | Performed by: RADIOLOGY

## 2021-12-14 PROCEDURE — 77063 BREAST TOMOSYNTHESIS BI: CPT | Mod: 26,,, | Performed by: RADIOLOGY

## 2021-12-16 ENCOUNTER — TELEPHONE (OUTPATIENT)
Dept: RADIOLOGY | Facility: HOSPITAL | Age: 61
End: 2021-12-16
Payer: COMMERCIAL

## 2021-12-17 ENCOUNTER — TELEPHONE (OUTPATIENT)
Dept: RADIOLOGY | Facility: HOSPITAL | Age: 61
End: 2021-12-17

## 2021-12-17 ENCOUNTER — HOSPITAL ENCOUNTER (OUTPATIENT)
Dept: RADIOLOGY | Facility: HOSPITAL | Age: 61
Discharge: HOME | End: 2021-12-17
Attending: FAMILY MEDICINE
Payer: COMMERCIAL

## 2021-12-17 DIAGNOSIS — R92.8 ABNORMAL FINDING ON BREAST IMAGING: ICD-10-CM

## 2021-12-17 PROCEDURE — 77065 DX MAMMO INCL CAD UNI: CPT | Mod: 26,LT,, | Performed by: RADIOLOGY

## 2021-12-17 PROCEDURE — 77061 BREAST TOMOSYNTHESIS UNI: CPT | Mod: TC,LT

## 2021-12-17 PROCEDURE — G0279 MAMMO DIGITAL DIAGNOSTIC LEFT WITH TOMO: ICD-10-PCS | Mod: 26,LT,, | Performed by: RADIOLOGY

## 2021-12-17 PROCEDURE — 77066 DX MAMMO INCL CAD BI: CPT | Mod: 26,,, | Performed by: RADIOLOGY

## 2021-12-17 PROCEDURE — 77065 MAMMO DIGITAL DIAGNOSTIC LEFT WITH TOMO: ICD-10-PCS | Mod: 26,LT,, | Performed by: RADIOLOGY

## 2021-12-17 PROCEDURE — G0279 TOMOSYNTHESIS, MAMMO: HCPCS | Mod: 26,LT,, | Performed by: RADIOLOGY

## 2021-12-17 PROCEDURE — 77066 PR MAMMO, CAD, DIAGNOSTIC, BILAT: ICD-10-PCS | Mod: 26,,, | Performed by: RADIOLOGY

## 2021-12-23 ENCOUNTER — HOSPITAL ENCOUNTER (OUTPATIENT)
Dept: RADIOLOGY | Facility: HOSPITAL | Age: 61
Discharge: HOME | End: 2021-12-23
Attending: FAMILY MEDICINE
Payer: COMMERCIAL

## 2021-12-23 DIAGNOSIS — R92.8 ABNORMAL FINDING ON BREAST IMAGING: ICD-10-CM

## 2021-12-23 PROCEDURE — 25000003 PHARM REV CODE 250: Performed by: FAMILY MEDICINE

## 2021-12-23 PROCEDURE — 77065 MAMMO DIGITAL DIAGNOSTIC LEFT: ICD-10-PCS | Mod: 26,LT,, | Performed by: RADIOLOGY

## 2021-12-23 PROCEDURE — 19081 MAMMO BREAST STEREOTACTIC BREAST BIOPSY LEFT: ICD-10-PCS | Mod: LT,,, | Performed by: RADIOLOGY

## 2021-12-23 PROCEDURE — 88305 TISSUE EXAM BY PATHOLOGIST: CPT | Performed by: PATHOLOGY

## 2021-12-23 PROCEDURE — 88305 TISSUE EXAM BY PATHOLOGIST: ICD-10-PCS | Mod: 26,,, | Performed by: PATHOLOGY

## 2021-12-23 PROCEDURE — 27200939 MAMMO BREAST STEREOTACTIC BREAST BIOPSY LEFT

## 2021-12-23 PROCEDURE — 19081 BX BREAST 1ST LESION STRTCTC: CPT | Mod: LT

## 2021-12-23 PROCEDURE — 88305 TISSUE EXAM BY PATHOLOGIST: CPT | Mod: 26,,, | Performed by: PATHOLOGY

## 2021-12-23 PROCEDURE — 77065 DX MAMMO INCL CAD UNI: CPT | Mod: TC,LT

## 2021-12-23 PROCEDURE — 77065 DX MAMMO INCL CAD UNI: CPT | Mod: 26,LT,, | Performed by: RADIOLOGY

## 2021-12-23 PROCEDURE — 19081 BX BREAST 1ST LESION STRTCTC: CPT | Mod: LT,,, | Performed by: RADIOLOGY

## 2021-12-23 RX ORDER — LIDOCAINE HYDROCHLORIDE AND EPINEPHRINE 20; 10 MG/ML; UG/ML
50 INJECTION, SOLUTION INFILTRATION; PERINEURAL ONCE
Status: COMPLETED | OUTPATIENT
Start: 2021-12-23 | End: 2021-12-23

## 2021-12-23 RX ORDER — LIDOCAINE HYDROCHLORIDE 10 MG/ML
10 INJECTION INFILTRATION; PERINEURAL ONCE
Status: COMPLETED | OUTPATIENT
Start: 2021-12-23 | End: 2021-12-23

## 2021-12-23 RX ADMIN — LIDOCAINE HYDROCHLORIDE AND EPINEPHRINE 20 ML: 20; 10 INJECTION, SOLUTION INFILTRATION; PERINEURAL at 09:12

## 2021-12-23 RX ADMIN — LIDOCAINE HYDROCHLORIDE 3 ML: 10 INJECTION, SOLUTION INFILTRATION; PERINEURAL at 09:12

## 2021-12-27 LAB
FINAL PATHOLOGIC DIAGNOSIS: NORMAL
GROSS: NORMAL
Lab: NORMAL

## 2021-12-28 ENCOUNTER — TELEPHONE (OUTPATIENT)
Dept: SURGERY | Facility: CLINIC | Age: 61
End: 2021-12-28
Payer: COMMERCIAL

## 2022-01-27 ENCOUNTER — IMMUNIZATION (OUTPATIENT)
Dept: INTERNAL MEDICINE | Facility: CLINIC | Age: 62
End: 2022-01-27
Payer: COMMERCIAL

## 2022-01-27 DIAGNOSIS — Z23 NEED FOR VACCINATION: Primary | ICD-10-CM

## 2022-01-27 PROCEDURE — 0004A COVID-19, MRNA, LNP-S, PF, 30 MCG/0.3 ML DOSE VACCINE: CPT | Mod: PBBFAC | Performed by: FAMILY MEDICINE

## 2022-02-22 ENCOUNTER — PATIENT MESSAGE (OUTPATIENT)
Dept: ADMINISTRATIVE | Facility: OTHER | Age: 62
End: 2022-02-22
Payer: COMMERCIAL

## 2022-03-09 ENCOUNTER — OFFICE VISIT (OUTPATIENT)
Dept: FAMILY MEDICINE | Facility: CLINIC | Age: 62
End: 2022-03-09
Payer: COMMERCIAL

## 2022-03-09 VITALS
HEART RATE: 74 BPM | WEIGHT: 181 LBS | OXYGEN SATURATION: 98 % | SYSTOLIC BLOOD PRESSURE: 110 MMHG | DIASTOLIC BLOOD PRESSURE: 62 MMHG | BODY MASS INDEX: 29.09 KG/M2 | HEIGHT: 66 IN

## 2022-03-09 DIAGNOSIS — E66.3 OVERWEIGHT (BMI 25.0-29.9): ICD-10-CM

## 2022-03-09 DIAGNOSIS — Z90.79 S/P TAH-BSO (TOTAL ABDOMINAL HYSTERECTOMY AND BILATERAL SALPINGO-OOPHORECTOMY): ICD-10-CM

## 2022-03-09 DIAGNOSIS — I10 BENIGN ESSENTIAL HYPERTENSION: ICD-10-CM

## 2022-03-09 DIAGNOSIS — Z79.82 LONG-TERM USE OF ASPIRIN THERAPY: ICD-10-CM

## 2022-03-09 DIAGNOSIS — G43.909 MIGRAINE SYNDROME: ICD-10-CM

## 2022-03-09 DIAGNOSIS — E78.5 HYPERLIPIDEMIA, UNSPECIFIED HYPERLIPIDEMIA TYPE: ICD-10-CM

## 2022-03-09 DIAGNOSIS — Z82.3 FAMILY HISTORY OF STROKE: ICD-10-CM

## 2022-03-09 DIAGNOSIS — Z90.710 S/P TAH-BSO (TOTAL ABDOMINAL HYSTERECTOMY AND BILATERAL SALPINGO-OOPHORECTOMY): ICD-10-CM

## 2022-03-09 DIAGNOSIS — J30.89 NON-SEASONAL ALLERGIC RHINITIS, UNSPECIFIED TRIGGER: ICD-10-CM

## 2022-03-09 DIAGNOSIS — Z90.722 S/P TAH-BSO (TOTAL ABDOMINAL HYSTERECTOMY AND BILATERAL SALPINGO-OOPHORECTOMY): ICD-10-CM

## 2022-03-09 DIAGNOSIS — Z00.00 ROUTINE GENERAL MEDICAL EXAMINATION AT A HEALTH CARE FACILITY: Primary | ICD-10-CM

## 2022-03-09 DIAGNOSIS — E55.9 VITAMIN D INSUFFICIENCY: ICD-10-CM

## 2022-03-09 DIAGNOSIS — N95.2 POST-MENOPAUSAL ATROPHIC VAGINITIS: ICD-10-CM

## 2022-03-09 DIAGNOSIS — R92.8 ABNORMAL MAMMOGRAM OF LEFT BREAST: ICD-10-CM

## 2022-03-09 PROCEDURE — 3008F PR BODY MASS INDEX (BMI) DOCUMENTED: ICD-10-PCS | Mod: CPTII,S$GLB,, | Performed by: FAMILY MEDICINE

## 2022-03-09 PROCEDURE — 3074F SYST BP LT 130 MM HG: CPT | Mod: CPTII,S$GLB,, | Performed by: FAMILY MEDICINE

## 2022-03-09 PROCEDURE — 3008F BODY MASS INDEX DOCD: CPT | Mod: CPTII,S$GLB,, | Performed by: FAMILY MEDICINE

## 2022-03-09 PROCEDURE — 1159F PR MEDICATION LIST DOCUMENTED IN MEDICAL RECORD: ICD-10-PCS | Mod: CPTII,S$GLB,, | Performed by: FAMILY MEDICINE

## 2022-03-09 PROCEDURE — 99396 PR PREVENTIVE VISIT,EST,40-64: ICD-10-PCS | Mod: S$GLB,,, | Performed by: FAMILY MEDICINE

## 2022-03-09 PROCEDURE — 99396 PREV VISIT EST AGE 40-64: CPT | Mod: S$GLB,,, | Performed by: FAMILY MEDICINE

## 2022-03-09 PROCEDURE — 3074F PR MOST RECENT SYSTOLIC BLOOD PRESSURE < 130 MM HG: ICD-10-PCS | Mod: CPTII,S$GLB,, | Performed by: FAMILY MEDICINE

## 2022-03-09 PROCEDURE — 99999 PR PBB SHADOW E&M-EST. PATIENT-LVL IV: ICD-10-PCS | Mod: PBBFAC,,, | Performed by: FAMILY MEDICINE

## 2022-03-09 PROCEDURE — 1160F RVW MEDS BY RX/DR IN RCRD: CPT | Mod: CPTII,S$GLB,, | Performed by: FAMILY MEDICINE

## 2022-03-09 PROCEDURE — 3078F PR MOST RECENT DIASTOLIC BLOOD PRESSURE < 80 MM HG: ICD-10-PCS | Mod: CPTII,S$GLB,, | Performed by: FAMILY MEDICINE

## 2022-03-09 PROCEDURE — 99999 PR PBB SHADOW E&M-EST. PATIENT-LVL IV: CPT | Mod: PBBFAC,,, | Performed by: FAMILY MEDICINE

## 2022-03-09 PROCEDURE — 1159F MED LIST DOCD IN RCRD: CPT | Mod: CPTII,S$GLB,, | Performed by: FAMILY MEDICINE

## 2022-03-09 PROCEDURE — 1160F PR REVIEW ALL MEDS BY PRESCRIBER/CLIN PHARMACIST DOCUMENTED: ICD-10-PCS | Mod: CPTII,S$GLB,, | Performed by: FAMILY MEDICINE

## 2022-03-09 PROCEDURE — 3078F DIAST BP <80 MM HG: CPT | Mod: CPTII,S$GLB,, | Performed by: FAMILY MEDICINE

## 2022-03-09 RX ORDER — SIMVASTATIN 20 MG/1
20 TABLET, FILM COATED ORAL DAILY
Qty: 90 TABLET | Refills: 4 | Status: SHIPPED | OUTPATIENT
Start: 2022-03-09 | End: 2024-01-04 | Stop reason: SDUPTHER

## 2022-03-09 RX ORDER — BUTALBITAL, ACETAMINOPHEN AND CAFFEINE 50; 325; 40 MG/1; MG/1; MG/1
1 TABLET ORAL EVERY 4 HOURS PRN
Qty: 60 TABLET | Refills: 3 | Status: SHIPPED | OUTPATIENT
Start: 2022-03-09 | End: 2023-07-04 | Stop reason: SDUPTHER

## 2022-03-09 RX ORDER — HYDROCHLOROTHIAZIDE 25 MG/1
25 TABLET ORAL DAILY
Qty: 90 TABLET | Refills: 4 | Status: SHIPPED | OUTPATIENT
Start: 2022-03-09 | End: 2023-07-04 | Stop reason: SDUPTHER

## 2022-03-09 RX ORDER — LISINOPRIL 20 MG/1
20 TABLET ORAL DAILY
Qty: 90 TABLET | Refills: 4 | Status: SHIPPED | OUTPATIENT
Start: 2022-03-09 | End: 2023-08-29 | Stop reason: SDUPTHER
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
## 2022-03-11 ENCOUNTER — LAB VISIT (OUTPATIENT)
Dept: LAB | Facility: HOSPITAL | Age: 62
End: 2022-03-11
Attending: FAMILY MEDICINE
Payer: COMMERCIAL

## 2022-03-11 DIAGNOSIS — Z00.00 ROUTINE GENERAL MEDICAL EXAMINATION AT A HEALTH CARE FACILITY: ICD-10-CM

## 2022-03-11 LAB
25(OH)D3+25(OH)D2 SERPL-MCNC: 32 NG/ML (ref 30–96)
ALBUMIN SERPL BCP-MCNC: 4 G/DL (ref 3.5–5.2)
ALP SERPL-CCNC: 77 U/L (ref 55–135)
ALT SERPL W/O P-5'-P-CCNC: 21 U/L (ref 10–44)
ANION GAP SERPL CALC-SCNC: 10 MMOL/L (ref 8–16)
AST SERPL-CCNC: 21 U/L (ref 10–40)
BASOPHILS # BLD AUTO: 0.07 K/UL (ref 0–0.2)
BASOPHILS NFR BLD: 0.8 % (ref 0–1.9)
BILIRUB SERPL-MCNC: 0.3 MG/DL (ref 0.1–1)
BUN SERPL-MCNC: 13 MG/DL (ref 8–23)
CALCIUM SERPL-MCNC: 9.2 MG/DL (ref 8.7–10.5)
CHLORIDE SERPL-SCNC: 107 MMOL/L (ref 95–110)
CHOLEST SERPL-MCNC: 156 MG/DL (ref 120–199)
CHOLEST/HDLC SERPL: 3.4 {RATIO} (ref 2–5)
CO2 SERPL-SCNC: 23 MMOL/L (ref 23–29)
CREAT SERPL-MCNC: 1 MG/DL (ref 0.5–1.4)
DIFFERENTIAL METHOD: NORMAL
EOSINOPHIL # BLD AUTO: 0.3 K/UL (ref 0–0.5)
EOSINOPHIL NFR BLD: 2.8 % (ref 0–8)
ERYTHROCYTE [DISTWIDTH] IN BLOOD BY AUTOMATED COUNT: 13.1 % (ref 11.5–14.5)
EST. GFR  (AFRICAN AMERICAN): >60 ML/MIN/1.73 M^2
EST. GFR  (NON AFRICAN AMERICAN): >60 ML/MIN/1.73 M^2
ESTIMATED AVG GLUCOSE: 120 MG/DL (ref 68–131)
GLUCOSE SERPL-MCNC: 104 MG/DL (ref 70–110)
HBA1C MFR BLD: 5.8 % (ref 4–5.6)
HCT VFR BLD AUTO: 41.3 % (ref 37–48.5)
HDLC SERPL-MCNC: 46 MG/DL (ref 40–75)
HDLC SERPL: 29.5 % (ref 20–50)
HGB BLD-MCNC: 13.6 G/DL (ref 12–16)
IMM GRANULOCYTES # BLD AUTO: 0.02 K/UL (ref 0–0.04)
IMM GRANULOCYTES NFR BLD AUTO: 0.2 % (ref 0–0.5)
LDLC SERPL CALC-MCNC: 93.8 MG/DL (ref 63–159)
LYMPHOCYTES # BLD AUTO: 2.8 K/UL (ref 1–4.8)
LYMPHOCYTES NFR BLD: 30.3 % (ref 18–48)
MAGNESIUM SERPL-MCNC: 1.9 MG/DL (ref 1.6–2.6)
MCH RBC QN AUTO: 29.4 PG (ref 27–31)
MCHC RBC AUTO-ENTMCNC: 32.9 G/DL (ref 32–36)
MCV RBC AUTO: 89 FL (ref 82–98)
MONOCYTES # BLD AUTO: 0.5 K/UL (ref 0.3–1)
MONOCYTES NFR BLD: 5.2 % (ref 4–15)
NEUTROPHILS # BLD AUTO: 5.6 K/UL (ref 1.8–7.7)
NEUTROPHILS NFR BLD: 60.7 % (ref 38–73)
NONHDLC SERPL-MCNC: 110 MG/DL
NRBC BLD-RTO: 0 /100 WBC
PLATELET # BLD AUTO: 307 K/UL (ref 150–450)
PMV BLD AUTO: 10.4 FL (ref 9.2–12.9)
POTASSIUM SERPL-SCNC: 3.8 MMOL/L (ref 3.5–5.1)
PROT SERPL-MCNC: 7.5 G/DL (ref 6–8.4)
RBC # BLD AUTO: 4.62 M/UL (ref 4–5.4)
SODIUM SERPL-SCNC: 140 MMOL/L (ref 136–145)
TRIGL SERPL-MCNC: 81 MG/DL (ref 30–150)
VIT B12 SERPL-MCNC: 323 PG/ML (ref 210–950)
WBC # BLD AUTO: 9.16 K/UL (ref 3.9–12.7)

## 2022-03-11 PROCEDURE — 83036 HEMOGLOBIN GLYCOSYLATED A1C: CPT | Performed by: FAMILY MEDICINE

## 2022-03-11 PROCEDURE — 82306 VITAMIN D 25 HYDROXY: CPT | Performed by: FAMILY MEDICINE

## 2022-03-11 PROCEDURE — 80053 COMPREHEN METABOLIC PANEL: CPT | Performed by: FAMILY MEDICINE

## 2022-03-11 PROCEDURE — 82607 VITAMIN B-12: CPT | Performed by: FAMILY MEDICINE

## 2022-03-11 PROCEDURE — 85025 COMPLETE CBC W/AUTO DIFF WBC: CPT | Performed by: FAMILY MEDICINE

## 2022-03-11 PROCEDURE — 80061 LIPID PANEL: CPT | Performed by: FAMILY MEDICINE

## 2022-03-11 PROCEDURE — 83735 ASSAY OF MAGNESIUM: CPT | Performed by: FAMILY MEDICINE

## 2022-03-11 PROCEDURE — 36415 COLL VENOUS BLD VENIPUNCTURE: CPT | Performed by: FAMILY MEDICINE

## 2022-03-11 PROCEDURE — 84443 ASSAY THYROID STIM HORMONE: CPT | Performed by: FAMILY MEDICINE

## 2022-03-12 ENCOUNTER — TELEPHONE (OUTPATIENT)
Dept: FAMILY MEDICINE | Facility: CLINIC | Age: 62
End: 2022-03-12
Payer: COMMERCIAL

## 2022-03-12 DIAGNOSIS — E55.9 VITAMIN D INSUFFICIENCY: Primary | ICD-10-CM

## 2022-03-12 LAB — TSH SERPL DL<=0.005 MIU/L-ACNC: 1.92 UIU/ML (ref 0.4–4)

## 2022-03-12 RX ORDER — CHOLECALCIFEROL (VITAMIN D3) 125 MCG
5000 CAPSULE ORAL
Qty: 90 CAPSULE | Refills: 4 | Status: SHIPPED | OUTPATIENT
Start: 2022-03-12 | End: 2023-11-07 | Stop reason: SDUPTHER

## 2022-03-23 ENCOUNTER — CLINICAL SUPPORT (OUTPATIENT)
Dept: OTHER | Facility: CLINIC | Age: 62
End: 2022-03-23
Payer: COMMERCIAL

## 2022-03-23 DIAGNOSIS — Z00.8 ENCOUNTER FOR OTHER GENERAL EXAMINATION: ICD-10-CM

## 2022-03-24 VITALS — BODY MASS INDEX: 33.1 KG/M2 | HEIGHT: 62 IN

## 2022-03-24 LAB
GLUCOSE SERPL-MCNC: 92 MG/DL (ref 60–140)
HDLC SERPL-MCNC: 54 MG/DL
POC CHOLESTEROL, LDL (DOCK): 123.6 MG/DL
POC CHOLESTEROL, TOTAL: 207 MG/DL
TRIGL SERPL-MCNC: 147 MG/DL

## 2022-05-30 ENCOUNTER — PATIENT MESSAGE (OUTPATIENT)
Dept: FAMILY MEDICINE | Facility: CLINIC | Age: 62
End: 2022-05-30
Payer: COMMERCIAL

## 2022-05-31 ENCOUNTER — PATIENT MESSAGE (OUTPATIENT)
Dept: FAMILY MEDICINE | Facility: CLINIC | Age: 62
End: 2022-05-31
Payer: COMMERCIAL

## 2022-06-01 ENCOUNTER — OFFICE VISIT (OUTPATIENT)
Dept: FAMILY MEDICINE | Facility: CLINIC | Age: 62
End: 2022-06-01
Payer: COMMERCIAL

## 2022-06-01 VITALS
HEIGHT: 62 IN | OXYGEN SATURATION: 98 % | BODY MASS INDEX: 33.26 KG/M2 | SYSTOLIC BLOOD PRESSURE: 122 MMHG | DIASTOLIC BLOOD PRESSURE: 72 MMHG | WEIGHT: 180.75 LBS | HEART RATE: 98 BPM
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
## 2022-06-01 PROCEDURE — 87075 CULTR BACTERIA EXCEPT BLOOD: CPT | Performed by: FAMILY MEDICINE

## 2022-06-01 PROCEDURE — 3044F PR MOST RECENT HEMOGLOBIN A1C LEVEL <7.0%: ICD-10-PCS | Mod: CPTII,S$GLB,, | Performed by: FAMILY MEDICINE

## 2022-06-01 PROCEDURE — 3078F DIAST BP <80 MM HG: CPT | Mod: CPTII,S$GLB,, | Performed by: FAMILY MEDICINE

## 2022-06-01 PROCEDURE — 4010F PR ACE/ARB THEARPY RXD/TAKEN: ICD-10-PCS | Mod: CPTII,S$GLB,, | Performed by: FAMILY MEDICINE

## 2022-06-01 PROCEDURE — 99214 PR OFFICE/OUTPT VISIT, EST, LEVL IV, 30-39 MIN: ICD-10-PCS | Mod: S$GLB,,, | Performed by: FAMILY MEDICINE

## 2022-06-01 PROCEDURE — 1160F PR REVIEW ALL MEDS BY PRESCRIBER/CLIN PHARMACIST DOCUMENTED: ICD-10-PCS | Mod: CPTII,S$GLB,, | Performed by: FAMILY MEDICINE

## 2022-06-01 PROCEDURE — 1159F PR MEDICATION LIST DOCUMENTED IN MEDICAL RECORD: ICD-10-PCS | Mod: CPTII,S$GLB,, | Performed by: FAMILY MEDICINE

## 2022-06-01 PROCEDURE — 99999 PR PBB SHADOW E&M-EST. PATIENT-LVL V: CPT | Mod: PBBFAC,,, | Performed by: FAMILY MEDICINE

## 2022-06-01 PROCEDURE — 3008F BODY MASS INDEX DOCD: CPT | Mod: CPTII,S$GLB,, | Performed by: FAMILY MEDICINE

## 2022-06-01 PROCEDURE — 3074F SYST BP LT 130 MM HG: CPT | Mod: CPTII,S$GLB,, | Performed by: FAMILY MEDICINE

## 2022-06-01 PROCEDURE — 99999 PR PBB SHADOW E&M-EST. PATIENT-LVL V: ICD-10-PCS | Mod: PBBFAC,,, | Performed by: FAMILY MEDICINE

## 2022-06-01 PROCEDURE — 3044F HG A1C LEVEL LT 7.0%: CPT | Mod: CPTII,S$GLB,, | Performed by: FAMILY MEDICINE

## 2022-06-01 PROCEDURE — 3078F PR MOST RECENT DIASTOLIC BLOOD PRESSURE < 80 MM HG: ICD-10-PCS | Mod: CPTII,S$GLB,, | Performed by: FAMILY MEDICINE

## 2022-06-01 PROCEDURE — 3008F PR BODY MASS INDEX (BMI) DOCUMENTED: ICD-10-PCS | Mod: CPTII,S$GLB,, | Performed by: FAMILY MEDICINE

## 2022-06-01 PROCEDURE — 1159F MED LIST DOCD IN RCRD: CPT | Mod: CPTII,S$GLB,, | Performed by: FAMILY MEDICINE

## 2022-06-01 PROCEDURE — 3074F PR MOST RECENT SYSTOLIC BLOOD PRESSURE < 130 MM HG: ICD-10-PCS | Mod: CPTII,S$GLB,, | Performed by: FAMILY MEDICINE

## 2022-06-01 PROCEDURE — 87070 CULTURE OTHR SPECIMN AEROBIC: CPT | Performed by: FAMILY MEDICINE

## 2022-06-01 PROCEDURE — 1160F RVW MEDS BY RX/DR IN RCRD: CPT | Mod: CPTII,S$GLB,, | Performed by: FAMILY MEDICINE

## 2022-06-01 PROCEDURE — 99214 OFFICE O/P EST MOD 30 MIN: CPT | Mod: S$GLB,,, | Performed by: FAMILY MEDICINE

## 2022-06-01 PROCEDURE — 4010F ACE/ARB THERAPY RXD/TAKEN: CPT | Mod: CPTII,S$GLB,, | Performed by: FAMILY MEDICINE

## 2022-06-01 RX ORDER — CEPHALEXIN 500 MG/1
500 CAPSULE ORAL EVERY 6 HOURS
Qty: 28 CAPSULE | Refills: 0 | Status: SHIPPED | OUTPATIENT
Start: 2022-06-01 | End: 2022-06-08

## 2022-06-01 NOTE — PROGRESS NOTES
"EST PATIENT VISIT FAMILY MEDICINE    CC:   Chief Complaint   Patient presents with    Follow-up     Spider bite and blistering    Diarrhea    Fatigue    Nausea       HPI: Esther Adams  is a 62 y.o. female:    Patient is new to me. She was outdoors over the weekend. She noted her legs were itchy after. She noted one lesion on her right thigh which initially was red, itchy and noted blister Monday morning. The redness has not spread but blister is growing with opaque colored fluid and "black dot" noted at center.      night/Monday had nausea/abdominal cramps/diarrhea. These have improved overall.     ROS: Review of Systems   Constitutional: Negative for fever.   Respiratory: Negative for shortness of breath.    Cardiovascular: Negative for chest pain.       PMHX:   Past Medical History:   Diagnosis Date    Allergic rhinitis 2016    trial of daily allegra or any antihistamine- avoid "D" products due to high blood pressure    Benign essential hypertension 2012    Family history of stroke 2016    Hyperlipidemia 2016    Migraine syndrome     Obesity (BMI 30.0-34.9) 2016    Positive hepatitis C antibody test 2017    Vitamin D insufficiency 2017       PSHX:   Past Surgical History:   Procedure Laterality Date    BILATERAL OOPHORECTOMY       SECTION      COLONOSCOPY N/A 2018    Procedure: COLONOSCOPY Golytely;  Surgeon: Jo-Ann Simon MD;  Location: [facility];  Service: Endoscopy;  Laterality: N/A;    HYSTERECTOMY      endometriosis    OOPHORECTOMY         FAMHX:   Family History   Problem Relation Age of Onset    Breast cancer Mother     Heart disease Mother     Stroke Mother     Hypertension Mother     Glaucoma Mother     Thyroid disease Mother     Osteoporosis Mother     Stroke Father     Schizophrenia Father     Pancreatitis Father         viral    Migraines Father     Migraines Sister     Ovarian cancer Maternal Grandmother 65 "    Ovarian cancer Other 61       SOCHX:   Social History     Socioeconomic History    Marital status:    Tobacco Use    Smoking status: Never Smoker    Smokeless tobacco: Never Used   Substance and Sexual Activity    Alcohol use: Yes     Comment: couple of glasses per week    Drug use: No    Sexual activity: Yes     Partners: Male     Comment:        ALLERGIES:   Review of patient's allergies indicates:   Allergen Reactions    Morphine      Other reaction(s): Itching       MEDS:   Current Outpatient Medications:     amitriptyline (ELAVIL) 50 MG tablet, Take 1-2 tablets by mouth nightly for sleep and migraine prevention (Patient taking differently: Take 1-2 tablets by mouth nightly for sleep and migraine prevention), Disp: 180 tablet, Rfl: 4    aspirin (ECOTRIN) 81 MG EC tablet, Take 1 tablet (81 mg total) by mouth once daily. 1 Tablet, Delayed Release (E.C.) Oral Every day, Disp: 90 tablet, Rfl: 0    butalbital-acetaminophen-caffeine -40 mg (FIORICET, ESGIC) -40 mg per tablet, Take 1 tablet by mouth every 4 (four) hours as needed for Pain or Headaches., Disp: 60 tablet, Rfl: 3    cholecalciferol, vitamin D3, 125 mcg (5,000 unit) capsule, Take 1 capsule (5,000 Units total) by mouth daily with breakfast., Disp: 90 capsule, Rfl: 4    estradiol (ESTRACE) 0.01 % (0.1 mg/gram) vaginal cream, Place 1 g vaginally twice a week. Use cream nightly for very 1st 2 weeks, Disp: 42.5 g, Rfl: 11    hydroCHLOROthiazide (HYDRODIURIL) 25 MG tablet, Take 1 tablet (25 mg total) by mouth once daily., Disp: 90 tablet, Rfl: 4    lisinopriL (PRINIVIL,ZESTRIL) 20 MG tablet, Take 1 tablet (20 mg total) by mouth once daily., Disp: 90 tablet, Rfl: 4    magnesium oxide (MAG-OX) 400 mg (241.3 mg magnesium) tablet, Take 1 tablet (400 mg total) by mouth once daily., Disp: 100 tablet, Rfl: 4    potassium chloride (KLOR-CON) 10 MEQ TbSR, Take 1 tablet (10 mEq total) by mouth once daily., Disp: 90 tablet,  "Rfl: 4    simvastatin (ZOCOR) 20 MG tablet, Take 1 tablet (20 mg total) by mouth once daily., Disp: 90 tablet, Rfl: 4    brompheniramine-pseudoeph-DM (BROMFED DM) 2-30-10 mg/5 mL Syrp, Take 10 mLs by mouth every 4 (four) hours as needed. (Patient not taking: No sig reported), Disp: 240 mL, Rfl: 0    cephALEXin (KEFLEX) 500 MG capsule, Take 1 capsule (500 mg total) by mouth every 6 (six) hours. for 7 days, Disp: 28 capsule, Rfl: 0    fluticasone propionate (FLONASE) 50 mcg/actuation nasal spray, 1 spray (50 mcg total) by Each Nostril route 2 (two) times daily. (Patient not taking: No sig reported), Disp: 16 g, Rfl: 3    Current Facility-Administered Medications:     acetaminophen tablet 650 mg, 650 mg, Oral, Once PRN, Efrain Giraldo MD    albuterol inhaler 2 puff, 2 puff, Inhalation, Q20 Min PRN, Efrain Giraldo MD    diphenhydrAMINE injection 25 mg, 25 mg, Intravenous, Once PRN, Efrain Giraldo MD    EPINEPHrine (EPIPEN) 0.3 mg/0.3 mL pen injection 0.3 mg, 0.3 mg, Intramuscular, PRN, Efrain Giraldo MD    methylPREDNISolone sodium succinate injection 40 mg, 40 mg, Intravenous, Once PRN, Efrain Giraldo MD    ondansetron disintegrating tablet 4 mg, 4 mg, Oral, Once PRN, Efrain Giraldo MD    sodium chloride 0.9% 500 mL flush bag, , Intravenous, PRN, Efrain Giraldo MD    sodium chloride 0.9% flush 10 mL, 10 mL, Intravenous, PRN, Efrain Giraldo MD    OBJECTIVE:   Vitals:    06/01/22 1121 06/01/22 1209   BP: 122/72    BP Location: Left arm    Patient Position: Sitting    BP Method: Large (Manual)    Pulse: (!) 118 98   SpO2: 98%    Weight: 82 kg (180 lb 12.4 oz)    Height: 5' 2" (1.575 m)      Body mass index is 33.06 kg/m².      Physical Exam  Vitals and nursing note reviewed.   Constitutional:       Appearance: Normal appearance. She is not toxic-appearing.   HENT:      Head: Normocephalic and atraumatic.   Eyes:      General: No scleral icterus.     Extraocular Movements: " Extraocular movements intact.   Pulmonary:      Effort: Pulmonary effort is normal.   Skin:     Comments: Approximately 1.5cm fluid filled vesicle on right inner thigh. Area cleaned in standard fashion and 23 gauge needle used to drain/express clear fluid collected for culture. Opaque appearance retained. Less than 1 cm incision made with scalpel without additional fluid drained/expressed. No bleeding. Patient tolerated well.    Neurological:      Mental Status: She is alert.           LABS:   A1C:  Recent Labs   Lab 03/11/22  0758   Hemoglobin A1C 5.8 H     CBC:  Recent Labs   Lab 03/11/22  0759   WBC 9.16   RBC 4.62   Hemoglobin 13.6   Hematocrit 41.3   Platelets 307   MCV 89   MCH 29.4   MCHC 32.9     CMP:  Recent Labs   Lab 03/11/22  0758   Glucose 104   Calcium 9.2   Albumin 4.0   Total Protein 7.5   Sodium 140   Potassium 3.8   CO2 23   Chloride 107   BUN 13   Creatinine 1.0   Alkaline Phosphatase 77   ALT 21   AST 21   Total Bilirubin 0.3     LIPIDS:  Recent Labs   Lab 03/11/22  0758 03/11/22  0759   TSH  --  1.917   HDL 46  --    Cholesterol 156  --    Triglycerides 81  --    LDL Cholesterol 93.8  --    HDL/Cholesterol Ratio 29.5  --    Non-HDL Cholesterol 110  --    Total Cholesterol/HDL Ratio 3.4  --      TSH:  Recent Labs   Lab 03/11/22  0759   TSH 1.917         ASSESSMENT & PLAN:    Problem List Items Addressed This Visit    None     Visit Diagnoses     Bug bite, initial encounter    -  Primary    Relevant Medications    cephALEXin (KEFLEX) 500 MG capsule    Other Relevant Orders    Aerobic culture (Completed)    CULTURE, ANAEROBE (Completed)    INCISION AND DRAINAGE            Follow up if symptoms worsen or fail to improve.      RTC/ED precautions discussed where applicable.   Encouraged patient to let me know if there are any further questions/concerns.     Advise patient/caretaker to check with insurance regarding orders to avoid unexpected fees/costs.     The patient/caretaker indicates  understanding of these issues and agrees with the plan.    Dr. Gwen Etienne MD  Family Medicine

## 2022-06-04 LAB — BACTERIA SPEC AEROBE CULT: NO GROWTH

## 2022-06-08 LAB — BACTERIA SPEC ANAEROBE CULT: NORMAL

## 2022-06-16 DIAGNOSIS — I10 BENIGN ESSENTIAL HYPERTENSION: ICD-10-CM

## 2022-06-16 DIAGNOSIS — E87.6 HYPOKALEMIA: ICD-10-CM

## 2022-06-16 DIAGNOSIS — K59.00 CONSTIPATION, UNSPECIFIED CONSTIPATION TYPE: ICD-10-CM

## 2022-06-16 RX ORDER — POTASSIUM CHLORIDE 750 MG/1
10 TABLET, EXTENDED RELEASE ORAL DAILY
Qty: 90 TABLET | Refills: 4 | Status: SHIPPED | OUTPATIENT
Start: 2022-06-16 | End: 2023-04-11 | Stop reason: DRUGHIGH

## 2022-06-16 RX ORDER — LANOLIN ALCOHOL/MO/W.PET/CERES
400 CREAM (GRAM) TOPICAL DAILY
Qty: 100 TABLET | Refills: 4 | Status: SHIPPED | OUTPATIENT
Start: 2022-06-16 | End: 2023-11-30 | Stop reason: SDUPTHER

## 2022-06-16 NOTE — TELEPHONE ENCOUNTER
No new care gaps identified.  [facility] Embedded Care Gaps. Reference number: [id number]. 6/16/2022   12:15:04 PM CDT

## 2022-06-30 ENCOUNTER — PATIENT MESSAGE (OUTPATIENT)
Dept: FAMILY MEDICINE | Facility: CLINIC | Age: 62
End: 2022-06-30
Payer: COMMERCIAL

## 2022-06-30 DIAGNOSIS — R92.8 ABNORMAL MAMMOGRAM OF LEFT BREAST: Primary | ICD-10-CM

## 2022-07-05 ENCOUNTER — TELEPHONE (OUTPATIENT)
Dept: FAMILY MEDICINE | Facility: CLINIC | Age: 62
End: 2022-07-05
Payer: COMMERCIAL

## 2022-07-06 ENCOUNTER — TELEPHONE (OUTPATIENT)
Dept: FAMILY MEDICINE | Facility: CLINIC | Age: 62
End: 2022-07-06
Payer: COMMERCIAL

## 2022-08-02 ENCOUNTER — HOSPITAL ENCOUNTER (OUTPATIENT)
Dept: RADIOLOGY | Facility: HOSPITAL | Age: 62
Discharge: HOME | End: 2022-08-02
Attending: FAMILY MEDICINE
Payer: COMMERCIAL

## 2022-08-02 DIAGNOSIS — R92.8 ABNORMAL FINDING ON BREAST IMAGING: ICD-10-CM

## 2022-08-02 PROCEDURE — 77061 MAMMO DIGITAL DIAGNOSTIC LEFT WITH TOMO: ICD-10-PCS | Mod: 26,LT,, | Performed by: RADIOLOGY

## 2022-08-02 PROCEDURE — 77065 DX MAMMO INCL CAD UNI: CPT | Mod: TC,LT

## 2022-08-02 PROCEDURE — 77061 BREAST TOMOSYNTHESIS UNI: CPT | Mod: 26,LT,, | Performed by: RADIOLOGY

## 2022-08-02 PROCEDURE — 77065 MAMMO DIGITAL DIAGNOSTIC LEFT WITH TOMO: ICD-10-PCS | Mod: 26,LT,, | Performed by: RADIOLOGY

## 2022-08-02 PROCEDURE — 77065 DX MAMMO INCL CAD UNI: CPT | Mod: 26,LT,, | Performed by: RADIOLOGY

## 2022-09-19 ENCOUNTER — OFFICE VISIT (OUTPATIENT)
Dept: FAMILY MEDICINE | Facility: CLINIC | Age: 62
End: 2022-09-19
Payer: COMMERCIAL

## 2022-09-19 DIAGNOSIS — J32.9 SINOBRONCHITIS: Primary | ICD-10-CM

## 2022-09-19 DIAGNOSIS — J40 SINOBRONCHITIS: Primary | ICD-10-CM

## 2022-09-19 PROCEDURE — 3044F HG A1C LEVEL LT 7.0%: CPT | Mod: CPTII,95,, | Performed by: NURSE PRACTITIONER

## 2022-09-19 PROCEDURE — 3044F PR MOST RECENT HEMOGLOBIN A1C LEVEL <7.0%: ICD-10-PCS | Mod: CPTII,95,, | Performed by: NURSE PRACTITIONER

## 2022-09-19 PROCEDURE — 1160F RVW MEDS BY RX/DR IN RCRD: CPT | Mod: CPTII,95,, | Performed by: NURSE PRACTITIONER

## 2022-09-19 PROCEDURE — 4010F ACE/ARB THERAPY RXD/TAKEN: CPT | Mod: CPTII,95,, | Performed by: NURSE PRACTITIONER

## 2022-09-19 PROCEDURE — 99213 OFFICE O/P EST LOW 20 MIN: CPT | Mod: 95,,, | Performed by: NURSE PRACTITIONER

## 2022-09-19 PROCEDURE — 1160F PR REVIEW ALL MEDS BY PRESCRIBER/CLIN PHARMACIST DOCUMENTED: ICD-10-PCS | Mod: CPTII,95,, | Performed by: NURSE PRACTITIONER

## 2022-09-19 PROCEDURE — 99213 PR OFFICE/OUTPT VISIT, EST, LEVL III, 20-29 MIN: ICD-10-PCS | Mod: 95,,, | Performed by: NURSE PRACTITIONER

## 2022-09-19 PROCEDURE — 1159F MED LIST DOCD IN RCRD: CPT | Mod: CPTII,95,, | Performed by: NURSE PRACTITIONER

## 2022-09-19 PROCEDURE — 1159F PR MEDICATION LIST DOCUMENTED IN MEDICAL RECORD: ICD-10-PCS | Mod: CPTII,95,, | Performed by: NURSE PRACTITIONER

## 2022-09-19 PROCEDURE — 4010F PR ACE/ARB THEARPY RXD/TAKEN: ICD-10-PCS | Mod: CPTII,95,, | Performed by: NURSE PRACTITIONER

## 2022-09-19 RX ORDER — AMOXICILLIN AND CLAVULANATE POTASSIUM 875; 125 MG/1; MG/1
1 TABLET, FILM COATED ORAL 2 TIMES DAILY
Qty: 20 TABLET | Refills: 0 | Status: SHIPPED | OUTPATIENT
Start: 2022-09-19 | End: 2022-09-29

## 2022-09-19 RX ORDER — BROMPHENIRAMINE MALEATE, PSEUDOEPHEDRINE HYDROCHLORIDE, AND DEXTROMETHORPHAN HYDROBROMIDE 2; 30; 10 MG/5ML; MG/5ML; MG/5ML
10 SYRUP ORAL EVERY 4 HOURS PRN
Qty: 240 ML | Refills: 0 | Status: SHIPPED | OUTPATIENT
Start: 2022-09-19 | End: 2023-03-31 | Stop reason: ALTCHOICE

## 2022-09-19 RX ORDER — PREDNISONE 20 MG/1
40 TABLET ORAL DAILY
Qty: 8 TABLET | Refills: 0 | Status: SHIPPED | OUTPATIENT
Start: 2022-09-19 | End: 2022-09-23

## 2022-09-19 NOTE — PROGRESS NOTES
Subjective:       Patient ID: Esther Adams is a 62 y.o. female.    Chief Complaint: URI    The patient location is: [place], [place]  The chief complaint leading to consultation is: URI    Visit type: audiovisual    Face to Face time with patient: 12  15 minutes of total time spent on the encounter, which includes face to face time and non-face to face time preparing to see the patient (eg, review of tests), Obtaining and/or reviewing separately obtained history, Documenting clinical information in the electronic or other health record, Independently interpreting results (not separately reported) and communicating results to the patient/family/caregiver, or Care coordination (not separately reported).         Each patient to whom he or she provides medical services by telemedicine is:  (1) informed of the relationship between the physician and patient and the respective role of any other health care provider with respect to management of the patient; and (2) notified that he or she may decline to receive medical services by telemedicine and may withdraw from such care at any time.    Notes:    URI   This is a new problem. Episode onset: started 3 weeks ago. The problem has been gradually worsening. There has been no fever. Associated symptoms include congestion, coughing, headaches, a plugged ear sensation, rhinorrhea, sinus pain and sneezing. Pertinent negatives include no chest pain, ear pain, rash, sore throat or wheezing. She has tried decongestant, acetaminophen and NSAIDs for the symptoms. The treatment provided moderate relief.   Cough  This is a recurrent problem. The current episode started 1 to 4 weeks ago. The problem has been waxing and waning. The problem occurs hourly. The cough is Productive of purulent sputum. Associated symptoms include headaches, nasal congestion, postnasal drip and rhinorrhea. Pertinent negatives include no chest pain, chills, ear congestion, ear pain, fever, heartburn,  hemoptysis, myalgias, rash, sore throat, shortness of breath, sweats, weight loss or wheezing. Nothing aggravates the symptoms. Risk factors for lung disease include animal exposure. She has tried OTC cough suppressant, body position changes and rest for the symptoms. The treatment provided no relief. Her past medical history is significant for bronchitis and environmental allergies. There is no history of asthma, bronchiectasis, COPD, emphysema or pneumonia.     Review of Systems   Constitutional:  Negative for chills, fever and weight loss.   HENT:  Positive for congestion, postnasal drip, rhinorrhea, sinus pain and sneezing. Negative for ear pain and sore throat.    Respiratory:  Positive for cough. Negative for hemoptysis, shortness of breath and wheezing.    Cardiovascular:  Negative for chest pain.   Gastrointestinal:  Negative for heartburn.   Musculoskeletal:  Negative for myalgias.   Skin:  Negative for rash.   Allergic/Immunologic: Positive for environmental allergies.   Neurological:  Positive for headaches.       Objective:     There were no vitals filed for this visit.       Physical Exam  Constitutional:       General: She is not in acute distress.     Appearance: She is well-developed. She is not diaphoretic.   HENT:      Head: Normocephalic and atraumatic.   Eyes:      General: No scleral icterus.        Right eye: No discharge.         Left eye: No discharge.      Conjunctiva/sclera: Conjunctivae normal.      Pupils: Pupils are equal, round, and reactive to light.   Pulmonary:      Effort: Pulmonary effort is normal. No respiratory distress.   Neurological:      Mental Status: She is alert and oriented to person, place, and time.   Psychiatric:         Behavior: Behavior normal.         Thought Content: Thought content normal.         Judgment: Judgment normal.         Assessment:         ICD-10-CM ICD-9-CM   1. Sinobronchitis  J32.9 473.9    J40 490       Plan:       Sinobronchitis  Take  medications below as directed  Have an in-person office visit if not improving.       amoxicillin-clavulanate 875-125mg (AUGMENTIN) 875-125 mg per tablet; Take 1 tablet by mouth 2 (two) times daily. for 10 days  Dispense: 20 tablet; Refill: 0  -     predniSONE (DELTASONE) 20 MG tablet; Take 2 tablets (40 mg total) by mouth once daily. for 4 days  Dispense: 8 tablet; Refill: 0  -     brompheniramine-pseudoeph-DM (BROMFED DM) 2-30-10 mg/5 mL Syrp; Take 10 mLs by mouth every 4 (four) hours as needed (congestion/cough).  Dispense: 240 mL; Refill: 0    Follow up if symptoms worsen or fail to improve.     Patient's Medications   New Prescriptions    AMOXICILLIN-CLAVULANATE 875-125MG (AUGMENTIN) 875-125 MG PER TABLET    Take 1 tablet by mouth 2 (two) times daily. for 10 days    BROMPHENIRAMINE-PSEUDOEPH-DM (BROMFED DM) 2-30-10 MG/5 ML SYRP    Take 10 mLs by mouth every 4 (four) hours as needed (congestion/cough).    PREDNISONE (DELTASONE) 20 MG TABLET    Take 2 tablets (40 mg total) by mouth once daily. for 4 days   Previous Medications    AMITRIPTYLINE (ELAVIL) 50 MG TABLET    Take 1-2 tablets by mouth nightly for sleep and migraine prevention    ASPIRIN (ECOTRIN) 81 MG EC TABLET    Take 1 tablet (81 mg total) by mouth once daily. 1 Tablet, Delayed Release (E.C.) Oral Every day    BUTALBITAL-ACETAMINOPHEN-CAFFEINE -40 MG (FIORICET, ESGIC) -40 MG PER TABLET    Take 1 tablet by mouth every 4 (four) hours as needed for Pain or Headaches.    CHOLECALCIFEROL, VITAMIN D3, 125 MCG (5,000 UNIT) CAPSULE    Take 1 capsule (5,000 Units total) by mouth daily with breakfast.    ESTRADIOL (ESTRACE) 0.01 % (0.1 MG/GRAM) VAGINAL CREAM    Place 1 g vaginally twice a week. Use cream nightly for very 1st 2 weeks    HYDROCHLOROTHIAZIDE (HYDRODIURIL) 25 MG TABLET    Take 1 tablet (25 mg total) by mouth once daily.    LISINOPRIL (PRINIVIL,ZESTRIL) 20 MG TABLET    Take 1 tablet (20 mg total) by mouth once daily.    MAGNESIUM OXIDE  "(MAG-OX) 400 MG (241.3 MG MAGNESIUM) TABLET    Take 1 tablet (400 mg total) by mouth once daily.    POTASSIUM CHLORIDE (KLOR-CON) 10 MEQ TBSR    Take 1 tablet (10 mEq total) by mouth once daily.    SIMVASTATIN (ZOCOR) 20 MG TABLET    Take 1 tablet (20 mg total) by mouth once daily.   Modified Medications    No medications on file   Discontinued Medications    BROMPHENIRAMINE-PSEUDOEPH-DM (BROMFED DM) 2-30-10 MG/5 ML SYRP    Take 10 mLs by mouth every 4 (four) hours as needed.    FLUTICASONE PROPIONATE (FLONASE) 50 MCG/ACTUATION NASAL SPRAY    1 spray (50 mcg total) by Each Nostril route 2 (two) times daily.       Past Medical History:   Diagnosis Date    Allergic rhinitis 2016    trial of daily allegra or any antihistamine- avoid "D" products due to high blood pressure    Benign essential hypertension 2012    Family history of stroke 2016    Hyperlipidemia 2016    Migraine syndrome     Obesity (BMI 30.0-34.9) 2016    Positive hepatitis C antibody test 2017    Vitamin D insufficiency 2017       Past Surgical History:   Procedure Laterality Date    BILATERAL OOPHORECTOMY       SECTION      COLONOSCOPY N/A 2018    Procedure: COLONOSCOPY Golytely;  Surgeon: Jo-Ann Simon MD;  [facility];  Service: Endoscopy;  Laterality: N/A;    HYSTERECTOMY      endometriosis    OOPHORECTOMY         Family History   Problem Relation Age of Onset    Breast cancer Mother     Heart disease Mother     Stroke Mother     Hypertension Mother     Glaucoma Mother     Thyroid disease Mother     Osteoporosis Mother     Cancer Mother     Stroke Father     Schizophrenia Father     Pancreatitis Father         viral    Migraines Father     Migraines Sister     Ovarian cancer Maternal Grandmother 65    Ovarian cancer Other 61       Social History     Socioeconomic History    Marital status:    Tobacco Use    Smoking status: Never    Smokeless tobacco: Never   Substance and Sexual " Activity    Alcohol use: Yes     Alcohol/week: 5.0 standard drinks     Types: 5 Glasses of wine per week     Comment: couple of glasses per week    Drug use: No    Sexual activity: Yes     Partners: Male     Comment:

## 2022-10-03 DIAGNOSIS — N95.2 ATROPHIC VAGINITIS: ICD-10-CM

## 2022-10-03 DIAGNOSIS — Z78.0 POSTMENOPAUSAL: ICD-10-CM

## 2022-10-03 RX ORDER — ESTRADIOL 0.1 MG/G
1 CREAM VAGINAL
Qty: 42.5 G | Refills: 11 | Status: SHIPPED | OUTPATIENT
Start: 2022-10-03 | End: 2024-03-11

## 2022-10-03 NOTE — TELEPHONE ENCOUNTER
Refill Routing Note   Medication(s) are not appropriate for processing by [facility] for the following reason(s):      - Outside of protocol    [facility] action(s):  Route          Medication reconciliation completed: No     Appointments  past 12m or future 3m with PCP    Date Provider   Last Visit   3/9/2022 Shanta Perez MD   Next Visit   3/24/2023 Shanta Perez MD   ED visits in past 90 days: 0        Note composed:4:18 PM 10/03/2022

## 2022-10-04 ENCOUNTER — PATIENT MESSAGE (OUTPATIENT)
Dept: FAMILY MEDICINE | Facility: CLINIC | Age: 62
End: 2022-10-04
Payer: COMMERCIAL

## 2022-10-04 DIAGNOSIS — B37.9 ANTIBIOTIC-INDUCED YEAST INFECTION: Primary | ICD-10-CM

## 2022-10-04 DIAGNOSIS — T36.95XA ANTIBIOTIC-INDUCED YEAST INFECTION: Primary | ICD-10-CM

## 2022-10-04 RX ORDER — FLUCONAZOLE 150 MG/1
TABLET ORAL
Qty: 2 TABLET | Refills: 0 | Status: SHIPPED | OUTPATIENT
Start: 2022-10-04 | End: 2023-03-31 | Stop reason: ALTCHOICE

## 2022-10-31 ENCOUNTER — PATIENT MESSAGE (OUTPATIENT)
Dept: OTHER | Facility: OTHER | Age: 62
End: 2022-10-31
Payer: COMMERCIAL

## 2022-11-16 ENCOUNTER — PATIENT MESSAGE (OUTPATIENT)
Dept: OTHER | Facility: OTHER | Age: 62
End: 2022-11-16
Payer: COMMERCIAL

## 2022-11-16 ENCOUNTER — TELEPHONE (OUTPATIENT)
Dept: FAMILY MEDICINE | Facility: CLINIC | Age: 62
End: 2022-11-16
Payer: COMMERCIAL

## 2022-11-16 ENCOUNTER — PATIENT MESSAGE (OUTPATIENT)
Dept: FAMILY MEDICINE | Facility: CLINIC | Age: 62
End: 2022-11-16
Payer: COMMERCIAL

## 2022-11-16 DIAGNOSIS — Z12.31 SCREENING MAMMOGRAM FOR BREAST CANCER: Primary | ICD-10-CM

## 2022-12-14 DIAGNOSIS — G43.909 MIGRAINE SYNDROME: ICD-10-CM

## 2022-12-14 RX ORDER — AMITRIPTYLINE HYDROCHLORIDE 50 MG/1
TABLET, FILM COATED ORAL
Qty: 180 TABLET | Refills: 0 | Status: SHIPPED | OUTPATIENT
Start: 2022-12-14 | End: 2023-05-07 | Stop reason: SDUPTHER

## 2022-12-15 ENCOUNTER — HOSPITAL ENCOUNTER (OUTPATIENT)
Dept: RADIOLOGY | Facility: HOSPITAL | Age: 62
Discharge: HOME | End: 2022-12-15
Attending: FAMILY MEDICINE
Payer: COMMERCIAL

## 2022-12-15 DIAGNOSIS — Z12.31 SCREENING MAMMOGRAM FOR BREAST CANCER: ICD-10-CM

## 2022-12-15 PROCEDURE — 77067 SCR MAMMO BI INCL CAD: CPT | Mod: TC

## 2022-12-15 PROCEDURE — 77067 SCR MAMMO BI INCL CAD: CPT | Mod: 26,,, | Performed by: RADIOLOGY

## 2022-12-15 PROCEDURE — 77063 BREAST TOMOSYNTHESIS BI: CPT | Mod: 26,,, | Performed by: RADIOLOGY

## 2022-12-15 PROCEDURE — 77063 MAMMO DIGITAL SCREENING BILAT WITH TOMO: ICD-10-PCS | Mod: 26,,, | Performed by: RADIOLOGY

## 2022-12-15 PROCEDURE — 77063 BREAST TOMOSYNTHESIS BI: CPT | Mod: TC

## 2022-12-15 PROCEDURE — 77067 MAMMO DIGITAL SCREENING BILAT WITH TOMO: ICD-10-PCS | Mod: 26,,, | Performed by: RADIOLOGY

## 2022-12-15 NOTE — TELEPHONE ENCOUNTER
Care Due:                  Date            Visit Type   Department     Provider  --------------------------------------------------------------------------------                                MYCHART                              ANNUAL                              CHECKUP/[facility]  Last Visit: 03-      S            MEDICINE       Shanta Perez                              [facility]                              ANNUAL                              CHECKUP/Y  [facility]  Next Visit: 03-      S            MEDICINE       Shanta Perez                                                            Last  Test          Frequency    Reason                     Performed    Due Date  --------------------------------------------------------------------------------    CMP.........  12 months..  hydroCHLOROthiazide,       03- 03-                             lisinopriL, potassium,                             simvastatin..............    [employer] Embedded Care Gaps. Reference number: [id number]. 12/14/2022   8:11:32 PM CST

## 2022-12-15 NOTE — TELEPHONE ENCOUNTER
Refill Decision Note   Esther Adams  is requesting a refill authorization.  Brief Assessment and Rationale for Refill:  Approve     Medication Therapy Plan:       Medication Reconciliation Completed: No   Comments:     Provider Staff:     Action is required for this patient.   Please see care gap opportunities below in Care Due Message.     Thanks!  [facility]     Appointments      Date Provider   Last Visit   3/9/2022 Shanta Perez MD   Next Visit   3/24/2023 Shanta Perez MD     Note composed:8:12 PM 12/14/2022           Note composed:8:12 PM 12/14/2022

## 2023-03-07 ENCOUNTER — CLINICAL SUPPORT (OUTPATIENT)
Dept: OTHER | Facility: CLINIC | Age: 63
End: 2023-03-07

## 2023-03-07 DIAGNOSIS — Z00.8 ENCOUNTER FOR OTHER GENERAL EXAMINATION: ICD-10-CM

## 2023-03-08 VITALS
SYSTOLIC BLOOD PRESSURE: 138 MMHG | WEIGHT: 181 LBS | HEIGHT: 62 IN | DIASTOLIC BLOOD PRESSURE: 76 MMHG | BODY MASS INDEX: 33.31 KG/M2

## 2023-03-08 LAB
GLUCOSE SERPL-MCNC: 108 MG/DL (ref 60–140)
HDLC SERPL-MCNC: 54 MG/DL
POC CHOLESTEROL, LDL (DOCK): 104 MG/DL
POC CHOLESTEROL, TOTAL: 177 MG/DL
TRIGL SERPL-MCNC: 107 MG/DL

## 2023-03-31 ENCOUNTER — OFFICE VISIT (OUTPATIENT)
Dept: FAMILY MEDICINE | Facility: CLINIC | Age: 63
End: 2023-03-31
Payer: COMMERCIAL

## 2023-03-31 VITALS
OXYGEN SATURATION: 97 % | SYSTOLIC BLOOD PRESSURE: 134 MMHG | WEIGHT: 183.88 LBS | TEMPERATURE: 98 F | HEART RATE: 103 BPM | HEIGHT: 62 IN | DIASTOLIC BLOOD PRESSURE: 68 MMHG | BODY MASS INDEX: 33.84 KG/M2

## 2023-03-31 DIAGNOSIS — E78.5 HYPERLIPIDEMIA, UNSPECIFIED HYPERLIPIDEMIA TYPE: ICD-10-CM

## 2023-03-31 DIAGNOSIS — E55.9 VITAMIN D INSUFFICIENCY: ICD-10-CM

## 2023-03-31 DIAGNOSIS — R07.89 COSTOCHONDRAL CHEST PAIN: ICD-10-CM

## 2023-03-31 DIAGNOSIS — G47.00 INSOMNIA, UNSPECIFIED TYPE: ICD-10-CM

## 2023-03-31 DIAGNOSIS — Z90.710 S/P TAH-BSO (TOTAL ABDOMINAL HYSTERECTOMY AND BILATERAL SALPINGO-OOPHORECTOMY): ICD-10-CM

## 2023-03-31 DIAGNOSIS — Z79.899 MEDICATION MANAGEMENT: ICD-10-CM

## 2023-03-31 DIAGNOSIS — J30.9 ALLERGIC RHINITIS WITH POSTNASAL DRIP: ICD-10-CM

## 2023-03-31 DIAGNOSIS — R09.82 ALLERGIC RHINITIS WITH POSTNASAL DRIP: ICD-10-CM

## 2023-03-31 DIAGNOSIS — Z79.82 LONG-TERM USE OF ASPIRIN THERAPY: ICD-10-CM

## 2023-03-31 DIAGNOSIS — Z12.31 SCREENING MAMMOGRAM FOR BREAST CANCER: ICD-10-CM

## 2023-03-31 DIAGNOSIS — Z00.00 ROUTINE GENERAL MEDICAL EXAMINATION AT A HEALTH CARE FACILITY: Primary | ICD-10-CM

## 2023-03-31 DIAGNOSIS — I10 BENIGN ESSENTIAL HYPERTENSION: ICD-10-CM

## 2023-03-31 DIAGNOSIS — Z12.11 SCREEN FOR COLON CANCER: ICD-10-CM

## 2023-03-31 DIAGNOSIS — H91.90 SUBJECTIVE HEARING LOSS: ICD-10-CM

## 2023-03-31 DIAGNOSIS — Z90.79 S/P TAH-BSO (TOTAL ABDOMINAL HYSTERECTOMY AND BILATERAL SALPINGO-OOPHORECTOMY): ICD-10-CM

## 2023-03-31 DIAGNOSIS — M79.674 GREAT TOE PAIN, RIGHT: ICD-10-CM

## 2023-03-31 DIAGNOSIS — G43.909 MIGRAINE SYNDROME: ICD-10-CM

## 2023-03-31 DIAGNOSIS — Z90.722 S/P TAH-BSO (TOTAL ABDOMINAL HYSTERECTOMY AND BILATERAL SALPINGO-OOPHORECTOMY): ICD-10-CM

## 2023-03-31 PROCEDURE — 3075F SYST BP GE 130 - 139MM HG: CPT | Mod: CPTII,S$GLB,, | Performed by: FAMILY MEDICINE

## 2023-03-31 PROCEDURE — 4010F PR ACE/ARB THEARPY RXD/TAKEN: ICD-10-PCS | Mod: CPTII,S$GLB,, | Performed by: FAMILY MEDICINE

## 2023-03-31 PROCEDURE — 99396 PR PREVENTIVE VISIT,EST,40-64: ICD-10-PCS | Mod: S$GLB,,, | Performed by: FAMILY MEDICINE

## 2023-03-31 PROCEDURE — 99396 PREV VISIT EST AGE 40-64: CPT | Mod: S$GLB,,, | Performed by: FAMILY MEDICINE

## 2023-03-31 PROCEDURE — 3008F BODY MASS INDEX DOCD: CPT | Mod: CPTII,S$GLB,, | Performed by: FAMILY MEDICINE

## 2023-03-31 PROCEDURE — 3075F PR MOST RECENT SYSTOLIC BLOOD PRESS GE 130-139MM HG: ICD-10-PCS | Mod: CPTII,S$GLB,, | Performed by: FAMILY MEDICINE

## 2023-03-31 PROCEDURE — 4010F ACE/ARB THERAPY RXD/TAKEN: CPT | Mod: CPTII,S$GLB,, | Performed by: FAMILY MEDICINE

## 2023-03-31 PROCEDURE — 3078F PR MOST RECENT DIASTOLIC BLOOD PRESSURE < 80 MM HG: ICD-10-PCS | Mod: CPTII,S$GLB,, | Performed by: FAMILY MEDICINE

## 2023-03-31 PROCEDURE — 3008F PR BODY MASS INDEX (BMI) DOCUMENTED: ICD-10-PCS | Mod: CPTII,S$GLB,, | Performed by: FAMILY MEDICINE

## 2023-03-31 PROCEDURE — 3078F DIAST BP <80 MM HG: CPT | Mod: CPTII,S$GLB,, | Performed by: FAMILY MEDICINE

## 2023-03-31 PROCEDURE — 1160F RVW MEDS BY RX/DR IN RCRD: CPT | Mod: CPTII,S$GLB,, | Performed by: FAMILY MEDICINE

## 2023-03-31 PROCEDURE — 1159F PR MEDICATION LIST DOCUMENTED IN MEDICAL RECORD: ICD-10-PCS | Mod: CPTII,S$GLB,, | Performed by: FAMILY MEDICINE

## 2023-03-31 PROCEDURE — 99999 PR PBB SHADOW E&M-EST. PATIENT-LVL V: ICD-10-PCS | Mod: PBBFAC,,, | Performed by: FAMILY MEDICINE

## 2023-03-31 PROCEDURE — 1159F MED LIST DOCD IN RCRD: CPT | Mod: CPTII,S$GLB,, | Performed by: FAMILY MEDICINE

## 2023-03-31 PROCEDURE — 1160F PR REVIEW ALL MEDS BY PRESCRIBER/CLIN PHARMACIST DOCUMENTED: ICD-10-PCS | Mod: CPTII,S$GLB,, | Performed by: FAMILY MEDICINE

## 2023-03-31 PROCEDURE — 99999 PR PBB SHADOW E&M-EST. PATIENT-LVL V: CPT | Mod: PBBFAC,,, | Performed by: FAMILY MEDICINE

## 2023-03-31 RX ORDER — CETIRIZINE HYDROCHLORIDE 10 MG/1
10 TABLET ORAL DAILY
Qty: 90 TABLET | Refills: 4 | Status: SHIPPED | OUTPATIENT
Start: 2023-03-31 | End: 2024-03-30
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
## 2023-04-11 ENCOUNTER — TELEPHONE (OUTPATIENT)
Dept: FAMILY MEDICINE | Facility: CLINIC | Age: 63
End: 2023-04-11
Payer: COMMERCIAL

## 2023-04-11 ENCOUNTER — LAB VISIT (OUTPATIENT)
Dept: LAB | Facility: HOSPITAL | Age: 63
End: 2023-04-11
Payer: COMMERCIAL

## 2023-04-11 DIAGNOSIS — Z00.00 ROUTINE GENERAL MEDICAL EXAMINATION AT A HEALTH CARE FACILITY: ICD-10-CM

## 2023-04-11 DIAGNOSIS — T50.905A DRUG-INDUCED HYPOKALEMIA: Primary | ICD-10-CM

## 2023-04-11 DIAGNOSIS — E87.6 DRUG-INDUCED HYPOKALEMIA: Primary | ICD-10-CM

## 2023-04-11 LAB
25(OH)D3+25(OH)D2 SERPL-MCNC: 63 NG/ML (ref 30–96)
ALBUMIN SERPL BCP-MCNC: 3.9 G/DL (ref 3.5–5.2)
ALP SERPL-CCNC: 75 U/L (ref 55–135)
ALT SERPL W/O P-5'-P-CCNC: 21 U/L (ref 10–44)
ANION GAP SERPL CALC-SCNC: 12 MMOL/L (ref 8–16)
AST SERPL-CCNC: 18 U/L (ref 10–40)
BILIRUB SERPL-MCNC: 0.4 MG/DL (ref 0.1–1)
BUN SERPL-MCNC: 16 MG/DL (ref 8–23)
CALCIUM SERPL-MCNC: 8.8 MG/DL (ref 8.7–10.5)
CHLORIDE SERPL-SCNC: 103 MMOL/L (ref 95–110)
CHOLEST SERPL-MCNC: 164 MG/DL (ref 120–199)
CHOLEST/HDLC SERPL: 3.6 {RATIO} (ref 2–5)
CO2 SERPL-SCNC: 26 MMOL/L (ref 23–29)
CREAT SERPL-MCNC: 1 MG/DL (ref 0.5–1.4)
EST. GFR  (NO RACE VARIABLE): >60 ML/MIN/1.73 M^2
ESTIMATED AVG GLUCOSE: 120 MG/DL (ref 68–131)
GLUCOSE SERPL-MCNC: 135 MG/DL (ref 70–110)
HBA1C MFR BLD: 5.8 % (ref 4–5.6)
HDLC SERPL-MCNC: 46 MG/DL (ref 40–75)
HDLC SERPL: 28 % (ref 20–50)
LDLC SERPL CALC-MCNC: 93 MG/DL (ref 63–159)
MAGNESIUM SERPL-MCNC: 2 MG/DL (ref 1.6–2.6)
NONHDLC SERPL-MCNC: 118 MG/DL
POTASSIUM SERPL-SCNC: 3.4 MMOL/L (ref 3.5–5.1)
PROT SERPL-MCNC: 7.5 G/DL (ref 6–8.4)
SODIUM SERPL-SCNC: 141 MMOL/L (ref 136–145)
TRIGL SERPL-MCNC: 125 MG/DL (ref 30–150)
TSH SERPL DL<=0.005 MIU/L-ACNC: 2.58 UIU/ML (ref 0.4–4)

## 2023-04-11 PROCEDURE — 82306 VITAMIN D 25 HYDROXY: CPT | Performed by: FAMILY MEDICINE

## 2023-04-11 PROCEDURE — 80053 COMPREHEN METABOLIC PANEL: CPT | Performed by: FAMILY MEDICINE

## 2023-04-11 PROCEDURE — 83735 ASSAY OF MAGNESIUM: CPT | Performed by: FAMILY MEDICINE

## 2023-04-11 PROCEDURE — 36415 COLL VENOUS BLD VENIPUNCTURE: CPT | Performed by: FAMILY MEDICINE

## 2023-04-11 PROCEDURE — 80061 LIPID PANEL: CPT | Performed by: FAMILY MEDICINE

## 2023-04-11 PROCEDURE — 84443 ASSAY THYROID STIM HORMONE: CPT | Performed by: FAMILY MEDICINE

## 2023-04-11 PROCEDURE — 83036 HEMOGLOBIN GLYCOSYLATED A1C: CPT | Performed by: FAMILY MEDICINE

## 2023-04-11 RX ORDER — POTASSIUM CHLORIDE 20 MEQ/1
20 TABLET, EXTENDED RELEASE ORAL DAILY
Qty: 90 TABLET | Refills: 4 | Status: SHIPPED | OUTPATIENT
Start: 2023-04-11

## 2023-04-18 ENCOUNTER — PATIENT MESSAGE (OUTPATIENT)
Dept: FAMILY MEDICINE | Facility: CLINIC | Age: 63
End: 2023-04-18
Payer: COMMERCIAL

## 2023-04-18 NOTE — TELEPHONE ENCOUNTER
Please schedule her for a virtual visit to discuss metformin and/or ozempic. She can take TWO of the 10 meq K tabs until they run out and then start the New Potassium 20 meq prescription thanks

## 2023-05-02 ENCOUNTER — PATIENT MESSAGE (OUTPATIENT)
Dept: FAMILY MEDICINE | Facility: CLINIC | Age: 63
End: 2023-05-02

## 2023-05-02 ENCOUNTER — OFFICE VISIT (OUTPATIENT)
Dept: FAMILY MEDICINE | Facility: CLINIC | Age: 63
End: 2023-05-02
Payer: COMMERCIAL

## 2023-05-02 DIAGNOSIS — I10 BENIGN ESSENTIAL HYPERTENSION: ICD-10-CM

## 2023-05-02 DIAGNOSIS — R73.03 PREDIABETES: Primary | ICD-10-CM

## 2023-05-02 DIAGNOSIS — E66.09 CLASS 1 OBESITY DUE TO EXCESS CALORIES WITH SERIOUS COMORBIDITY AND BODY MASS INDEX (BMI) OF 33.0 TO 33.9 IN ADULT: ICD-10-CM

## 2023-05-02 DIAGNOSIS — Z79.899 MEDICATION MANAGEMENT: ICD-10-CM

## 2023-05-02 PROBLEM — E66.811 CLASS 1 OBESITY DUE TO EXCESS CALORIES WITH SERIOUS COMORBIDITY AND BODY MASS INDEX (BMI) OF 33.0 TO 33.9 IN ADULT: Status: ACTIVE | Noted: 2023-05-02

## 2023-05-02 PROCEDURE — 1159F PR MEDICATION LIST DOCUMENTED IN MEDICAL RECORD: ICD-10-PCS | Mod: CPTII,95,, | Performed by: FAMILY MEDICINE

## 2023-05-02 PROCEDURE — 1160F RVW MEDS BY RX/DR IN RCRD: CPT | Mod: CPTII,95,, | Performed by: FAMILY MEDICINE

## 2023-05-02 PROCEDURE — 3044F HG A1C LEVEL LT 7.0%: CPT | Mod: CPTII,95,, | Performed by: FAMILY MEDICINE

## 2023-05-02 PROCEDURE — 4010F PR ACE/ARB THEARPY RXD/TAKEN: ICD-10-PCS | Mod: CPTII,95,, | Performed by: FAMILY MEDICINE

## 2023-05-02 PROCEDURE — 1159F MED LIST DOCD IN RCRD: CPT | Mod: CPTII,95,, | Performed by: FAMILY MEDICINE

## 2023-05-02 PROCEDURE — 1160F PR REVIEW ALL MEDS BY PRESCRIBER/CLIN PHARMACIST DOCUMENTED: ICD-10-PCS | Mod: CPTII,95,, | Performed by: FAMILY MEDICINE

## 2023-05-02 PROCEDURE — 3044F PR MOST RECENT HEMOGLOBIN A1C LEVEL <7.0%: ICD-10-PCS | Mod: CPTII,95,, | Performed by: FAMILY MEDICINE

## 2023-05-02 PROCEDURE — 99214 OFFICE O/P EST MOD 30 MIN: CPT | Mod: 95,,, | Performed by: FAMILY MEDICINE

## 2023-05-02 PROCEDURE — 99214 PR OFFICE/OUTPT VISIT, EST, LEVL IV, 30-39 MIN: ICD-10-PCS | Mod: 95,,, | Performed by: FAMILY MEDICINE

## 2023-05-02 PROCEDURE — 4010F ACE/ARB THERAPY RXD/TAKEN: CPT | Mod: CPTII,95,, | Performed by: FAMILY MEDICINE

## 2023-05-02 RX ORDER — METFORMIN HYDROCHLORIDE 500 MG/1
1000 TABLET, EXTENDED RELEASE ORAL
Qty: 180 TABLET | Refills: 4 | Status: SHIPPED | OUTPATIENT
Start: 2023-05-02

## 2023-05-02 NOTE — PATIENT INSTRUCTIONS
An order for a colonoscopy has been placed. If you have not been contacted within 5-7 days to schedule this procedure, please call GI scheduling at [phone]--[facility] colonoscopy.

## 2023-05-02 NOTE — PROGRESS NOTES
Office Visit    Patient Name: Esther Adams    : 1960  MRN: [id number]    Subjective:  Esther is a 62 y.o. female who presents today for:    No chief complaint on file.    The patient location is: her work   The chief complaint leading to consultation is: discuss labs, prediabetes treatment     Visit type: audiovisual    Face to Face time with patient: start 935     25 minutes of total time spent on the encounter, which includes face to face time and non-face to face time preparing to see the patient (eg, review of tests), Obtaining and/or reviewing separately obtained history, Documenting clinical information in the electronic or other health record, Independently interpreting results (not separately reported) and communicating results to the patient/family/caregiver, or Care coordination (not separately reported).     Each patient to whom he or she provides medical services by telemedicine is:  (1) informed of the relationship between the physician and patient and the respective role of any other health care provider with respect to management of the patient; and (2) notified that he or she may decline to receive medical services by telemedicine and may withdraw from such care at any time.    Notes:     Physical 3/31/23      Esther Adams presents today for virtual visit to discuss treatment of Pre-Diabetes.  Most recent labs 23 show A1c of 5.8--stable over the last 3 years, normal CMP, Lipid with LDL of 93.     Chronic health conditions including obesity, hypertension, hyperlipidemia, history of colitis s/p GI evaluation and colonoscopy 2018, chronic migraines.     She is the Director of Educational and Professional Development, [employer], for [employer]. This is a challenging position with significant stress-- she has multiple responsibilities, has to fill multiple rolls, and often fatigued by the end of the day  Struggles to give lifestyle changes adequate attention and open to  medication to help with weight loss/treatment of prediabetes.      General lifestyle habits are as follows:    Diet: FAIR, trying to meal plan more-- bring lunch to work  Exercise: fair: doing some more walking but hasn't gone back to the gym or started using home elliptica or swimming   Sleep: FAIR-- Elavil helps with sleep and migraines, but her sleep is still fairly interrupted  Weight: STABLE IN THE LAST YEAR AT BMI 33.      Immunizations: TDaP 9/7/2016, FLU SHOT 10/4/22,  SHINGRIX ADVISED, COVID 19 completed 1/6/21 w/ Pfizer BOOSTER 1/27/22 & OMICRON BOOSTER ADVISED     Screening Tests: colonoscopy 2/7/2018--> repeat 5 years--ORDERED but not scheduled, mammogram 12/14/21--> Diagnostic L mammogram and biopsy advised & L Breast diag mammo due 6/2022 then screening mammo 12/15/22 ok--REPEAT 1 year (12/2023), DEXA 10/2/19-- NORMAL AND REPEAT AT AGE 64, no longer needs paps-- EUGENIE/BSO secondary to endometriosis, Hep C Ab + 9/11/17 but s/p 2 negative viral loads (most recently 9/21/2018)          PAST MEDICAL HISTORY, SURGICAL/SOCIAL/FAMILY HISTORY REVIEWED AS PER CHART, WITH PERTINENT FINDINGS INCLUDED IN HISTORY SECTION OF NOTE.     Current Medications    Medication List with Changes/Refills   New Medications    METFORMIN (GLUCOPHAGE-XR) 500 MG ER 24HR TABLET    Take 2 tablets (1,000 mg total) by mouth daily with dinner or evening meal.   Current Medications    AMITRIPTYLINE (ELAVIL) 50 MG TABLET    Take 1-2 tablets by mouth nightly for sleep and migraine prevention    ASPIRIN (ECOTRIN) 81 MG EC TABLET    Take 1 tablet (81 mg total) by mouth once daily. 1 Tablet, Delayed Release (E.C.) Oral Every day    BUTALBITAL-ACETAMINOPHEN-CAFFEINE -40 MG (FIORICET, ESGIC) -40 MG PER TABLET    Take 1 tablet by mouth every 4 (four) hours as needed for Pain or Headaches.    CETIRIZINE (ZYRTEC) 10 MG TABLET    Take 1 tablet (10 mg total) by mouth once daily.    CHOLECALCIFEROL, VITAMIN D3, 125 MCG (5,000 UNIT) CAPSULE     Take 1 capsule (5,000 Units total) by mouth daily with breakfast.    ESTRADIOL (ESTRACE) 0.01 % (0.1 MG/GRAM) VAGINAL CREAM    Place 1 g vaginally twice a week. Use cream nightly for very 1st 2 weeks    HYDROCHLOROTHIAZIDE (HYDRODIURIL) 25 MG TABLET    Take 1 tablet (25 mg total) by mouth once daily.    LISINOPRIL (PRINIVIL,ZESTRIL) 20 MG TABLET    Take 1 tablet (20 mg total) by mouth once daily.    MAGNESIUM OXIDE (MAG-OX) 400 MG (241.3 MG MAGNESIUM) TABLET    Take 1 tablet (400 mg total) by mouth once daily.    POTASSIUM CHLORIDE SA (KLOR-CON M20) 20 MEQ TABLET    Take 1 tablet (20 mEq total) by mouth once daily.    SIMVASTATIN (ZOCOR) 20 MG TABLET    Take 1 tablet (20 mg total) by mouth once daily.       Allergies   Review of patient's allergies indicates:   Allergen Reactions    Morphine      Other reaction(s): Itching         Review of Systems (Pertinent positives)  Review of Systems   Constitutional:  Negative for activity change and unexpected weight change.   HENT:  Negative for hearing loss, rhinorrhea and trouble swallowing.    Eyes:  Negative for discharge and visual disturbance.   Respiratory:  Negative for chest tightness and wheezing.    Cardiovascular:  Negative for chest pain and palpitations.   Gastrointestinal:  Negative for blood in stool, constipation, diarrhea and vomiting.   Endocrine: Negative for polydipsia and polyuria.   Genitourinary:  Negative for difficulty urinating, dysuria, hematuria and menstrual problem.   Musculoskeletal:  Negative for arthralgias, joint swelling and neck pain.   Neurological:  Negative for weakness and headaches.   Psychiatric/Behavioral:  Negative for confusion and dysphoric mood.      LMP 01/01/2002 (Approximate) Comment: 15-18 yrs ago    Physical Exam  Vitals reviewed.   Constitutional:       General: She is not in acute distress.     Appearance: Normal appearance. She is well-developed. She is obese.   HENT:      Head: Normocephalic and atraumatic.   Eyes:       Conjunctiva/sclera: Conjunctivae normal.   Pulmonary:      Effort: Pulmonary effort is normal.   Skin:     General: Skin is warm and dry.   Neurological:      Mental Status: She is alert and oriented to person, place, and time.   Psychiatric:         Mood and Affect: Mood normal.         Behavior: Behavior normal.         Assessment/Plan:  Esther Adams is a 62 y.o. female who presents today for :        ICD-10-CM ICD-9-CM    1. Prediabetes  R73.03 790.29 Comprehensive Metabolic Panel      Hemoglobin A1C      metFORMIN (GLUCOPHAGE-XR) 500 MG ER 24hr tablet      2. Class 1 obesity due to excess calories with serious comorbidity and body mass index (BMI) of 33.0 to 33.9 in adult  E66.09 278.00     Z68.33 V85.33       3. Benign essential hypertension  I10 401.1       4. Medication management  Z79.899 V58.69 Comprehensive Metabolic Panel      Hemoglobin A1C        INITIATE METFORMIN 500-->1000XR DAILY WITH MEAL FOR TREATMENT OF PREDIABETES AND HOPEFULLY HELP WITH WEIGHT LOSS.   DISCUSS ATTENTION TO HEALTHY DIET AND EXERCISE-- ADVISED HIIT STYLE WORKOUTS WITH STRENGTH TRAINING TO HELP BUILD MUSCLE AND IMPROVE METABOLIC RATE.   Discussed potential GI SEs from Metformin    LABS PRIOR TO VV IN 3-4 MONTHS TO RECHECK A1C AND DISCUSS EFFECTIVENESS OF METFORMIN FOR PREDIABETES AND WEIGHT MANAGEMENT.    BP CONTROLLED--DIGITAL MED    MAMMOGRAM SCHEDULED    GIVEN NUMBER FOR COLONOSCOPY SCHEDULING AS SHE WAS NOT CONTACTED TO SCHEDULE -- ORDER PLACED 3/31/23    Patient Instructions   An order for a colonoscopy has been placed. If you have not been contacted within 5-7 days to schedule this procedure, please call GI scheduling at [phone]--Aliya colonoscopy.           Follow up in about 3 months (around 8/2/2023) for to follow up on lab results, return as needed for new concerns.

## 2023-05-07 DIAGNOSIS — G43.909 MIGRAINE SYNDROME: ICD-10-CM

## 2023-05-07 NOTE — TELEPHONE ENCOUNTER
No care due was identified.  [facility] Embedded Care Due Messages. Reference number: [id number].   5/07/2023 9:59:42 AM CDT

## 2023-05-08 RX ORDER — AMITRIPTYLINE HYDROCHLORIDE 50 MG/1
TABLET, FILM COATED ORAL
Qty: 180 TABLET | Refills: 3 | Status: SHIPPED | OUTPATIENT
Start: 2023-05-08

## 2023-05-08 NOTE — TELEPHONE ENCOUNTER
Refill Decision Note   Esther Ortizorly  is requesting a refill authorization.  Brief Assessment and Rationale for Refill:  Approve     Medication Therapy Plan:       Medication Reconciliation Completed: No   Comments:     No Care Gaps recommended.     Note composed:9:52 AM 05/08/2023

## 2023-05-09 ENCOUNTER — OFFICE VISIT (OUTPATIENT)
Dept: OTOLARYNGOLOGY | Facility: CLINIC | Age: 63
End: 2023-05-09
Payer: COMMERCIAL

## 2023-05-09 ENCOUNTER — CLINICAL SUPPORT (OUTPATIENT)
Dept: OTOLARYNGOLOGY | Facility: CLINIC | Age: 63
End: 2023-05-09
Payer: COMMERCIAL

## 2023-05-09 VITALS
HEART RATE: 77 BPM | WEIGHT: 179.69 LBS | SYSTOLIC BLOOD PRESSURE: 126 MMHG | BODY MASS INDEX: 32.86 KG/M2 | DIASTOLIC BLOOD PRESSURE: 75 MMHG | TEMPERATURE: 98 F

## 2023-05-09 DIAGNOSIS — H93.13 TINNITUS AURIUM, BILATERAL: Chronic | ICD-10-CM

## 2023-05-09 DIAGNOSIS — J30.9 CHRONIC ALLERGIC RHINITIS: Chronic | ICD-10-CM

## 2023-05-09 DIAGNOSIS — H90.3 SENSORINEURAL HEARING LOSS (SNHL), BILATERAL: Chronic | ICD-10-CM

## 2023-05-09 DIAGNOSIS — H93.19 TINNITUS, UNSPECIFIED LATERALITY: ICD-10-CM

## 2023-05-09 DIAGNOSIS — H90.3 SENSORINEURAL HEARING LOSS, BILATERAL: Primary | ICD-10-CM

## 2023-05-09 DIAGNOSIS — H90.3 ASYMMETRIC SNHL (SENSORINEURAL HEARING LOSS): Primary | Chronic | ICD-10-CM

## 2023-05-09 PROCEDURE — 1159F PR MEDICATION LIST DOCUMENTED IN MEDICAL RECORD: ICD-10-PCS | Mod: CPTII,S$GLB,, | Performed by: OTOLARYNGOLOGY

## 2023-05-09 PROCEDURE — 4010F PR ACE/ARB THEARPY RXD/TAKEN: ICD-10-PCS | Mod: CPTII,S$GLB,, | Performed by: OTOLARYNGOLOGY

## 2023-05-09 PROCEDURE — 1160F PR REVIEW ALL MEDS BY PRESCRIBER/CLIN PHARMACIST DOCUMENTED: ICD-10-PCS | Mod: CPTII,S$GLB,, | Performed by: OTOLARYNGOLOGY

## 2023-05-09 PROCEDURE — 3074F SYST BP LT 130 MM HG: CPT | Mod: CPTII,S$GLB,, | Performed by: OTOLARYNGOLOGY

## 2023-05-09 PROCEDURE — 99999 PR PBB SHADOW E&M-EST. PATIENT-LVL IV: CPT | Mod: PBBFAC,,, | Performed by: OTOLARYNGOLOGY

## 2023-05-09 PROCEDURE — 92567 TYMPANOMETRY: CPT | Mod: S$GLB,,, | Performed by: PHYSICIAN ASSISTANT

## 2023-05-09 PROCEDURE — 3078F DIAST BP <80 MM HG: CPT | Mod: CPTII,S$GLB,, | Performed by: OTOLARYNGOLOGY

## 2023-05-09 PROCEDURE — 1159F MED LIST DOCD IN RCRD: CPT | Mod: CPTII,S$GLB,, | Performed by: OTOLARYNGOLOGY

## 2023-05-09 PROCEDURE — 99204 OFFICE O/P NEW MOD 45 MIN: CPT | Mod: S$GLB,,, | Performed by: OTOLARYNGOLOGY

## 2023-05-09 PROCEDURE — 3078F PR MOST RECENT DIASTOLIC BLOOD PRESSURE < 80 MM HG: ICD-10-PCS | Mod: CPTII,S$GLB,, | Performed by: OTOLARYNGOLOGY

## 2023-05-09 PROCEDURE — 3008F PR BODY MASS INDEX (BMI) DOCUMENTED: ICD-10-PCS | Mod: CPTII,S$GLB,, | Performed by: OTOLARYNGOLOGY

## 2023-05-09 PROCEDURE — 92567 PR TYMPA2METRY: ICD-10-PCS | Mod: S$GLB,,, | Performed by: PHYSICIAN ASSISTANT

## 2023-05-09 PROCEDURE — 4010F ACE/ARB THERAPY RXD/TAKEN: CPT | Mod: CPTII,S$GLB,, | Performed by: OTOLARYNGOLOGY

## 2023-05-09 PROCEDURE — 3044F PR MOST RECENT HEMOGLOBIN A1C LEVEL <7.0%: ICD-10-PCS | Mod: CPTII,S$GLB,, | Performed by: OTOLARYNGOLOGY

## 2023-05-09 PROCEDURE — 99204 PR OFFICE/OUTPT VISIT, NEW, LEVL IV, 45-59 MIN: ICD-10-PCS | Mod: S$GLB,,, | Performed by: OTOLARYNGOLOGY

## 2023-05-09 PROCEDURE — 3044F HG A1C LEVEL LT 7.0%: CPT | Mod: CPTII,S$GLB,, | Performed by: OTOLARYNGOLOGY

## 2023-05-09 PROCEDURE — 99999 PR PBB SHADOW E&M-EST. PATIENT-LVL I: CPT | Mod: PBBFAC,,, | Performed by: PHYSICIAN ASSISTANT

## 2023-05-09 PROCEDURE — 99999 PR PBB SHADOW E&M-EST. PATIENT-LVL IV: ICD-10-PCS | Mod: PBBFAC,,, | Performed by: OTOLARYNGOLOGY

## 2023-05-09 PROCEDURE — 92557 PR COMPREHENSIVE HEARING TEST: ICD-10-PCS | Mod: S$GLB,,, | Performed by: PHYSICIAN ASSISTANT

## 2023-05-09 PROCEDURE — 1160F RVW MEDS BY RX/DR IN RCRD: CPT | Mod: CPTII,S$GLB,, | Performed by: OTOLARYNGOLOGY

## 2023-05-09 PROCEDURE — 99999 PR PBB SHADOW E&M-EST. PATIENT-LVL I: ICD-10-PCS | Mod: PBBFAC,,, | Performed by: PHYSICIAN ASSISTANT

## 2023-05-09 PROCEDURE — 3008F BODY MASS INDEX DOCD: CPT | Mod: CPTII,S$GLB,, | Performed by: OTOLARYNGOLOGY

## 2023-05-09 PROCEDURE — 3074F PR MOST RECENT SYSTOLIC BLOOD PRESSURE < 130 MM HG: ICD-10-PCS | Mod: CPTII,S$GLB,, | Performed by: OTOLARYNGOLOGY

## 2023-05-09 PROCEDURE — 92557 COMPREHENSIVE HEARING TEST: CPT | Mod: S$GLB,,, | Performed by: PHYSICIAN ASSISTANT

## 2023-05-09 RX ORDER — FLUTICASONE PROPIONATE 50 MCG
2 SPRAY, SUSPENSION (ML) NASAL DAILY
Qty: 16 G | Refills: 11 | Status: SHIPPED | OUTPATIENT
Start: 2023-05-09

## 2023-05-09 NOTE — PROGRESS NOTES
"  Chief Complaint   Patient presents with    Hearing Loss     Having trouble hearing in both ears   .     HPI:  Esther Adams is a very pleasant 62 y.o. female here to see me today for the first time for evaluation of hearing loss.  She reports hearing loss that has been gradually progressing over the last 2 years.  She has noted any difference in hearing between the ears, with the left ear being the worse hearing ear.  She has not noted any tinnitus in both ears.  This is constant and high pitched. She has not had any recent issues with ear pain or ear drainage.  She  has not had any previous otologic surgery.  She denies any history of significant loud noise exposure. She notes post-nasal drip.  She has recently started zyrtec. She is unsure if this is helping.          Past Medical History:   Diagnosis Date    Allergic rhinitis 2016    trial of daily allegra or any antihistamine- avoid "D" products due to high blood pressure    Benign essential hypertension 2012    Family history of stroke 2016    Hyperlipidemia 2016    Migraine syndrome     Obesity (BMI 30.0-34.9) 2016    Positive hepatitis C antibody test 2017    Vitamin D insufficiency 2017     Social History     Socioeconomic History    Marital status:    Tobacco Use    Smoking status: Never    Smokeless tobacco: Never   Substance and Sexual Activity    Alcohol use: Yes     Alcohol/week: 5.0 standard drinks     Types: 5 Glasses of wine per week     Comment: couple of glasses per week    Drug use: No    Sexual activity: Yes     Partners: Male     Comment:      Past Surgical History:   Procedure Laterality Date    BILATERAL OOPHORECTOMY      BREAST BIOPSY Left      SECTION      COLONOSCOPY N/A 2018    Procedure: COLONOSCOPY Golytely;  Surgeon: Jo-Ann Simon MD;  Location: [facility];  Service: Endoscopy;  Laterality: N/A;    HYSTERECTOMY      endometriosis    OOPHORECTOMY       Family " History   Problem Relation Age of Onset    Breast cancer Mother 60    Heart disease Mother     Stroke Mother     Hypertension Mother     Glaucoma Mother     Thyroid disease Mother     Osteoporosis Mother     Cancer Mother     Stroke Father     Schizophrenia Father     Pancreatitis Father         viral    Migraines Father     Migraines Sister     Breast cancer Sister 60    Ovarian cancer Maternal Grandmother 65    Ovarian cancer Other 61         Review of Systems  General: negative for chills, fever or weight loss  Psychological: negative for mood changes or depression  Ophthalmic: negative for blurry vision, photophobia or eye pain  ENT: see HPI  Respiratory: no cough, shortness of breath, or wheezing  Cardiovascular: no chest pain or dyspnea on exertion  Gastrointestinal: no abdominal pain, change in bowel habits, or black/ bloody stools  Musculoskeletal: negative for gait disturbance or muscular weakness  Neurological: no syncope or seizures; no ataxia  Dermatological: negative for puritis,  rash and jaundice  Hematologic/lymphatic: no easy bruising, no new lumps or bumps      Physical Exam:    Vitals:    05/09/23 1009   BP: 126/75   Pulse: 77   Temp: 98.4 °F (36.9 °C)       Constitutional: Well appearing / communicating without difficutly.  NAD.  Eyes: EOM I Bilaterally  Head/Face: Normocephalic.  Negative paranasal sinus pressure/tenderness.  Salivary glands WNL.  House Brackmann I Bilaterally.    Right Ear: Auricle normal appearance. External Auditory Canal within normal limits no lesions or masses,TM w/o masses/lesions/perforations. TM mobility noted.   Left Ear: Auricle normal appearance. External Auditory Canal within normal limits no lesions or masses,TM w/o masses/lesions/perforations. TM mobility noted.  Rinne Air conduction >bone conduction bilaterally, Thomas midline.   Nose: No gross nasal septal deviation. Inferior Turbinates 3+ bilaterally. No septal perforation. No masses/lesions. External nasal skin  appears normal without masses/lesions.  Oral Cavity: Gingiva/lips within normal limits.  Dentition/gingiva healthy appearing. Mucus membranes moist. Floor of mouth soft, no masses palpated. Oral Tongue mobile. Hard Palate appears normal.    Oropharynx: Base of tongue appears normal. No masses/lesions noted. Tonsillar fossa/pharyngeal wall without lesions. Posterior oropharynx WNL.  Soft palate without masses. Midline uvula.   Neck/Lymphatic: No LAD I-VI bilaterally.  No thyromegaly.  No masses noted on exam.      Diagnostic studies:  Audiogram interpreted personally by me and discussed in detail with the patient today. Audiogram results revealed a mild to severe sensorineural hearing loss in the right ear and a mild to moderate sensorineural hearing loss in the left ear.  Speech reception thresholds were noted at 25 dB in the right ear and 20 dB in the left ear.  Speech discrimination scores were 80% in the right ear and 84% in the left ear.  Tympanometry revealed Type A in the right ear and Type A in the left ear. The results of the audiogram were reviewed with the patient and the benefits of amplification were discussed.        Assessment:    ICD-10-CM ICD-9-CM    1. Asymmetric SNHL (sensorineural hearing loss)  H90.3 389.16 MRI IAC/Temporal Bones W W/O Contrast      2. Sensorineural hearing loss (SNHL), bilateral  H90.3 389.18 Ambulatory referral/consult to ENT      3. Tinnitus aurium, bilateral  H93.13 388.31       4. Chronic allergic rhinitis  J30.9 477.9         The primary encounter diagnosis was Asymmetric SNHL (sensorineural hearing loss). Diagnoses of Sensorineural hearing loss (SNHL), bilateral, Tinnitus aurium, bilateral, and Chronic allergic rhinitis were also pertinent to this visit.      Plan:  Orders Placed This Encounter   Procedures    MRI IAC/Temporal Bones W W/O Contrast       Recommend nasal saline rinses BID  Start Flonase 2 sprays per nostril daily  Continue zyrtec 10 mg PO daily    We  reviewed the recent audiogram, and the fact that there is a distinct asymmetry of at least 10 dB across 3 frequencies.  We discussed that as humans we are not entirely symmetric, and that many people have one ear that hears better than the other.  However, considering the degree of asymmetry, I would recommend and MRI of the IAC with contrast to evaluate for any retrocochlear lesions.  If that is normal, the patient will continue to follow with annual audiograms and may proceed with binaural amplification if desires.         Thank you kindly for allowing me to participate in the patient's care.       Patricia Loera MD

## 2023-05-09 NOTE — PROGRESS NOTES
Esther Adams, a 62 y.o. female, was seen today in the clinic for an audiologic evaluation.  Patients main complaints were hearing loss and tinnitus.  Ms. Adams reports difficulty understanding conversation and will utilize close-caption while watching TV.    Audiogram results revealed a mild to severe sensorineural hearing loss in the right ear and a mild to moderate sensorineural hearing loss in the left ear.  Speech reception thresholds were noted at 25 dB in the right ear and 20 dB in the left ear.  Speech discrimination scores were 80% in the right ear and 84% in the left ear.  Tympanometry revealed Type A in the right ear and Type A in the left ear. The results of the audiogram were reviewed with the patient and the benefits of amplification were discussed.    Recommendations:  Otologic evaluation  Annual audiogram  Hearing protection when in noise  Hearing aid consultation

## 2023-05-22 ENCOUNTER — HOSPITAL ENCOUNTER (OUTPATIENT)
Dept: RADIOLOGY | Facility: HOSPITAL | Age: 63
Discharge: HOME | End: 2023-05-22
Attending: OTOLARYNGOLOGY
Payer: COMMERCIAL

## 2023-05-22 DIAGNOSIS — H90.3 ASYMMETRIC SNHL (SENSORINEURAL HEARING LOSS): ICD-10-CM

## 2023-06-01 ENCOUNTER — HOSPITAL ENCOUNTER (OUTPATIENT)
Dept: RADIOLOGY | Facility: HOSPITAL | Age: 63
Discharge: HOME | End: 2023-06-01
Attending: OTOLARYNGOLOGY
Payer: COMMERCIAL

## 2023-06-01 PROCEDURE — 70553 MRI BRAIN STEM W/O & W/DYE: CPT | Mod: 26,,, | Performed by: RADIOLOGY

## 2023-06-01 PROCEDURE — 70553 MRI IAC/TEMPORAL BONES W W/O CONTRAST: ICD-10-PCS | Mod: 26,,, | Performed by: RADIOLOGY

## 2023-06-01 PROCEDURE — 70553 MRI BRAIN STEM W/O & W/DYE: CPT | Mod: TC

## 2023-06-01 PROCEDURE — A9585 GADOBUTROL INJECTION: HCPCS | Performed by: OTOLARYNGOLOGY

## 2023-06-01 PROCEDURE — 25500020 PHARM REV CODE 255: Performed by: OTOLARYNGOLOGY

## 2023-06-01 RX ORDER — GADOBUTROL 604.72 MG/ML
10 INJECTION INTRAVENOUS
Status: COMPLETED | OUTPATIENT
Start: 2023-06-01 | End: 2023-06-01

## 2023-06-01 RX ADMIN — GADOBUTROL 10 ML: 604.72 INJECTION INTRAVENOUS at 02:06

## 2023-06-02 ENCOUNTER — TELEPHONE (OUTPATIENT)
Dept: OTOLARYNGOLOGY | Facility: CLINIC | Age: 63
End: 2023-06-02
Payer: COMMERCIAL

## 2023-06-15 ENCOUNTER — TELEPHONE (OUTPATIENT)
Dept: FAMILY MEDICINE | Facility: CLINIC | Age: 63
End: 2023-06-15
Payer: COMMERCIAL

## 2023-06-15 NOTE — TELEPHONE ENCOUNTER
Called pt and asked if she has contacted the number to schedule to colonocopy. Pt stated yes. Told pt I forwarded the message over to the gastro department to see if we can get the pt scheduled for the procedure

## 2023-06-15 NOTE — TELEPHONE ENCOUNTER
----- Message from Pura Rodriguez sent at 6/15/2023 12:07 PM CDT -----  Pt Requesting Call Back    Who called: pt  Who called for pt:  Best call back #: [phone]  Add notes: pt said a colonoscopy have not been set for her and she was told to call back if one haven't been

## 2023-07-04 DIAGNOSIS — I10 BENIGN ESSENTIAL HYPERTENSION: ICD-10-CM

## 2023-07-04 DIAGNOSIS — G43.909 MIGRAINE SYNDROME: ICD-10-CM

## 2023-07-04 RX ORDER — HYDROCHLOROTHIAZIDE 25 MG/1
25 TABLET ORAL DAILY
Qty: 90 TABLET | Refills: 4 | Status: SHIPPED | OUTPATIENT
Start: 2023-07-04

## 2023-07-04 RX ORDER — BUTALBITAL, ACETAMINOPHEN AND CAFFEINE 50; 325; 40 MG/1; MG/1; MG/1
1 TABLET ORAL EVERY 4 HOURS PRN
Qty: 60 TABLET | Refills: 3 | Status: SHIPPED | OUTPATIENT
Start: 2023-07-04

## 2023-07-04 NOTE — TELEPHONE ENCOUNTER
No care due was identified.  [facility] Embedded Care Due Messages. Reference number: [id number].   7/04/2023 7:42:55 AM CDT

## 2023-07-04 NOTE — TELEPHONE ENCOUNTER
Refill Routing Note   Medication(s) are not appropriate for processing by [facility] for the following reason(s):      Medication outside of protocol  Required labs abnormal    [facility] action(s):  Defer  Route None identified   Medication Therapy Plan: FIORICET(OP)      Appointments  past 12m or future 3m with PCP    Date Provider   Last Visit   5/2/2023 Shanta Perez MD   Next Visit   8/22/2023 Shanta Perez MD   ED visits in past 90 days: 0        Note composed:8:01 AM 07/04/2023

## 2023-07-12 ENCOUNTER — TELEPHONE (OUTPATIENT)
Dept: GASTROENTEROLOGY | Facility: CLINIC | Age: 63
End: 2023-07-12
Payer: COMMERCIAL

## 2023-07-12 ENCOUNTER — PATIENT MESSAGE (OUTPATIENT)
Dept: FAMILY MEDICINE | Facility: CLINIC | Age: 63
End: 2023-07-12
Payer: COMMERCIAL

## 2023-07-12 DIAGNOSIS — Z12.11 COLON CANCER SCREENING: Primary | ICD-10-CM

## 2023-07-12 NOTE — TELEPHONE ENCOUNTER
----- Message from Pura Rodriguez sent at 7/12/2023 12:18 PM CDT -----  Pt Requesting to Schedule an Appointment     Pt is requesting to schedule an appointment that our scheduling dept cannot schedule.    Who called: pt  Best call back #: [phone]  When pt wants appt:  Reason for appt: colonoscopy  Additional notes:

## 2023-07-14 RX ORDER — POLYETHYLENE GLYCOL 3350, SODIUM SULFATE ANHYDROUS, SODIUM BICARBONATE, SODIUM CHLORIDE, POTASSIUM CHLORIDE 236; 22.74; 6.74; 5.86; 2.97 G/4L; G/4L; G/4L; G/4L; G/4L
4 POWDER, FOR SOLUTION ORAL ONCE
Qty: 4000 ML | Refills: 0 | Status: SHIPPED | OUTPATIENT
Start: 2023-07-14 | End: 2023-07-25

## 2023-07-31 ENCOUNTER — TELEPHONE (OUTPATIENT)
Dept: GASTROENTEROLOGY | Facility: CLINIC | Age: 63
End: 2023-07-31
Payer: COMMERCIAL

## 2023-08-01 ENCOUNTER — ANESTHESIA EVENT (OUTPATIENT)
Dept: ENDOSCOPY | Facility: HOSPITAL | Age: 63
End: 2023-08-01
Payer: COMMERCIAL

## 2023-08-01 RX ORDER — LIDOCAINE HYDROCHLORIDE 10 MG/ML
1 INJECTION, SOLUTION EPIDURAL; INFILTRATION; INTRACAUDAL; PERINEURAL ONCE
Status: CANCELLED | OUTPATIENT
Start: 2023-08-01 | End: 2023-08-01

## 2023-08-02 ENCOUNTER — ANESTHESIA (OUTPATIENT)
Dept: ENDOSCOPY | Facility: HOSPITAL | Age: 63
End: 2023-08-02
Payer: COMMERCIAL

## 2023-08-02 ENCOUNTER — HOSPITAL ENCOUNTER (OUTPATIENT)
Facility: HOSPITAL | Age: 63
Discharge: HOME | End: 2023-08-02
Attending: INTERNAL MEDICINE | Admitting: INTERNAL MEDICINE
Payer: COMMERCIAL

## 2023-08-02 VITALS
TEMPERATURE: 98 F | SYSTOLIC BLOOD PRESSURE: 113 MMHG | OXYGEN SATURATION: 98 % | DIASTOLIC BLOOD PRESSURE: 58 MMHG | RESPIRATION RATE: 15 BRPM | HEART RATE: 54 BPM

## 2023-08-02 DIAGNOSIS — Z12.11 SCREEN FOR COLON CANCER: ICD-10-CM

## 2023-08-02 PROCEDURE — 37000009 HC ANESTHESIA EA ADD 15 MINS: Performed by: INTERNAL MEDICINE

## 2023-08-02 PROCEDURE — 45380 COLONOSCOPY AND BIOPSY: CPT | Mod: PT,59 | Performed by: INTERNAL MEDICINE

## 2023-08-02 PROCEDURE — 37000008 HC ANESTHESIA 1ST 15 MINUTES: Performed by: INTERNAL MEDICINE

## 2023-08-02 PROCEDURE — D9220A PRA ANESTHESIA: ICD-10-PCS | Mod: 33,ANES,, | Performed by: ANESTHESIOLOGY

## 2023-08-02 PROCEDURE — 27201012 HC FORCEPS, HOT/COLD, DISP: Performed by: INTERNAL MEDICINE

## 2023-08-02 PROCEDURE — 63600175 PHARM REV CODE 636 W HCPCS: Performed by: NURSE ANESTHETIST, CERTIFIED REGISTERED

## 2023-08-02 PROCEDURE — 27201089 HC SNARE, DISP (ANY): Performed by: INTERNAL MEDICINE

## 2023-08-02 PROCEDURE — D9220A PRA ANESTHESIA: Mod: 33,ANES,, | Performed by: ANESTHESIOLOGY

## 2023-08-02 PROCEDURE — 45380 COLONOSCOPY AND BIOPSY: CPT | Mod: 33,59,, | Performed by: INTERNAL MEDICINE

## 2023-08-02 PROCEDURE — 88305 TISSUE EXAM BY PATHOLOGIST: ICD-10-PCS | Mod: 26,,, | Performed by: PATHOLOGY

## 2023-08-02 PROCEDURE — 45385 PR COLONOSCOPY,REMV LESN,SNARE: ICD-10-PCS | Mod: 33,,, | Performed by: INTERNAL MEDICINE

## 2023-08-02 PROCEDURE — 88305 TISSUE EXAM BY PATHOLOGIST: CPT | Mod: 26,,, | Performed by: PATHOLOGY

## 2023-08-02 PROCEDURE — 45385 COLONOSCOPY W/LESION REMOVAL: CPT | Mod: PT | Performed by: INTERNAL MEDICINE

## 2023-08-02 PROCEDURE — 45385 COLONOSCOPY W/LESION REMOVAL: CPT | Mod: 33,,, | Performed by: INTERNAL MEDICINE

## 2023-08-02 PROCEDURE — D9220A PRA ANESTHESIA: ICD-10-PCS | Mod: 33,CRNA,, | Performed by: NURSE ANESTHETIST, CERTIFIED REGISTERED

## 2023-08-02 PROCEDURE — D9220A PRA ANESTHESIA: Mod: 33,CRNA,, | Performed by: NURSE ANESTHETIST, CERTIFIED REGISTERED

## 2023-08-02 PROCEDURE — 25000003 PHARM REV CODE 250: Performed by: NURSE ANESTHETIST, CERTIFIED REGISTERED

## 2023-08-02 PROCEDURE — 88305 TISSUE EXAM BY PATHOLOGIST: CPT | Mod: 59 | Performed by: PATHOLOGY

## 2023-08-02 PROCEDURE — 25000003 PHARM REV CODE 250: Performed by: ANESTHESIOLOGY

## 2023-08-02 PROCEDURE — 45380 PR COLONOSCOPY,BIOPSY: ICD-10-PCS | Mod: 33,59,, | Performed by: INTERNAL MEDICINE

## 2023-08-02 RX ORDER — SODIUM CHLORIDE 9 MG/ML
INJECTION, SOLUTION INTRAVENOUS CONTINUOUS
Status: DISCONTINUED | OUTPATIENT
Start: 2023-08-02 | End: 2023-08-02 | Stop reason: HOSPADM

## 2023-08-02 RX ORDER — LIDOCAINE HYDROCHLORIDE 20 MG/ML
INJECTION INTRAVENOUS
Status: DISCONTINUED | OUTPATIENT
Start: 2023-08-02 | End: 2023-08-02

## 2023-08-02 RX ORDER — PROPOFOL 10 MG/ML
INJECTION, EMULSION INTRAVENOUS
Status: DISCONTINUED
Start: 2023-08-02 | End: 2023-08-02 | Stop reason: HOSPADM

## 2023-08-02 RX ORDER — LIDOCAINE HYDROCHLORIDE 20 MG/ML
INJECTION, SOLUTION EPIDURAL; INFILTRATION; INTRACAUDAL; PERINEURAL
Status: DISCONTINUED
Start: 2023-08-02 | End: 2023-08-02 | Stop reason: HOSPADM

## 2023-08-02 RX ORDER — PROPOFOL 10 MG/ML
VIAL (ML) INTRAVENOUS
Status: DISCONTINUED | OUTPATIENT
Start: 2023-08-02 | End: 2023-08-02

## 2023-08-02 RX ADMIN — SODIUM CHLORIDE: 0.9 INJECTION, SOLUTION INTRAVENOUS at 08:08

## 2023-08-02 RX ADMIN — PROPOFOL 40 MG: 10 INJECTION, EMULSION INTRAVENOUS at 08:08

## 2023-08-02 RX ADMIN — LIDOCAINE HYDROCHLORIDE 100 MG: 20 INJECTION, SOLUTION INTRAVENOUS at 08:08

## 2023-08-02 RX ADMIN — PROPOFOL 80 MG: 10 INJECTION, EMULSION INTRAVENOUS at 08:08

## 2023-08-02 RX ADMIN — PROPOFOL 40 MG: 10 INJECTION, EMULSION INTRAVENOUS at 09:08

## 2023-08-02 NOTE — PLAN OF CARE
Procedure and recovery complete. Pt awake and alert. MD Simon and  at bedside, procedure findings and suggestions discussed. Discharge instructions given, pt verbalized understanding of instruction. Iv was D/c'd, inner cannula intact. No distress noted. No c/o pain. Gait steady, able to ambulate without assistance. Pt walked out accompanied by .

## 2023-08-02 NOTE — TRANSFER OF CARE
Anesthesia Transfer of Care Note    Patient: Esther Adams    Procedure(s) Performed: Procedure(s) (LRB):  COLONOSCOPY (N/A)    Patient location: GI    Anesthesia Type: general    Transport from OR: Transported from OR on room air with adequate spontaneous ventilation    Post pain: adequate analgesia    Post assessment: no apparent anesthetic complications and tolerated procedure well    Post vital signs: stable    Level of consciousness: sedated    Nausea/Vomiting: no nausea/vomiting    Complications: none    Transfer of care protocol was followed      Last vitals:   Visit Vitals  BP (!) 135/56 (BP Location: Left arm, Patient Position: Lying)   Pulse 72   Temp 36.5 °C (97.7 °F) (Oral)   Resp 15   LMP 01/01/2002 (Approximate)   SpO2 100%   Breastfeeding No

## 2023-08-02 NOTE — ANESTHESIA PREPROCEDURE EVALUATION
"                                                                                                             08/02/2023  Esther Adams is a 63 y.o., female.    Past Medical History:   Diagnosis Date    Allergic rhinitis 9/7/2016    trial of daily allegra or any antihistamine- avoid "D" products due to high blood pressure    Benign essential hypertension 12/14/2012    Family history of stroke 9/7/2016    Hyperlipidemia 9/7/2016    Migraine syndrome     Obesity (BMI 30.0-34.9) 9/7/2016    Positive hepatitis C antibody test 9/23/2017    Vitamin D insufficiency 9/23/2017       Pre-op Assessment    I have reviewed the Patient Summary Reports.     I have reviewed the Nursing Notes.    I have reviewed the Medications.     Review of Systems  Anesthesia Hx:  No problems with previous Anesthesia  Neg history of prior surgery. Denies Family Hx of Anesthesia complications.   Denies Personal Hx of Anesthesia complications.   Social:  Non-Smoker, No Alcohol Use    Hematology/Oncology:  Hematology Normal   Oncology Normal     EENT/Dental:EENT/Dental Normal   Cardiovascular:   Exercise tolerance: good Hypertension hyperlipidemia    Renal/:  Renal/ Normal     Hepatic/GI:   GERD    Musculoskeletal:  Musculoskeletal Normal    Neurological:   Headaches    Dermatological:  Skin Normal    Psych:  Psychiatric Normal         Lab Results   Component Value Date    WBC 9.16 03/11/2022    HGB 13.6 03/11/2022    HCT 41.3 03/11/2022    MCV 89 03/11/2022     03/11/2022           Physical Exam  General: Well nourished    Airway:  Mallampati: II   Mouth Opening: Normal  Tongue: Normal  Neck ROM: Normal ROM    Dental:  Intact    Chest/Lungs:  Clear to auscultation    Heart:  Rate: Normal  Rhythm: Regular Rhythm  Sounds: Normal        Anesthesia Plan  Type of Anesthesia, risks & benefits discussed:    Anesthesia Type: Gen Natural Airway  Intra-op Monitoring Plan: Standard ASA Monitors  Induction:  IV  Informed Consent: " Informed consent signed with the Patient and all parties understand the risks and agree with anesthesia plan.  All questions answered.   ASA Score: 2    Ready For Surgery From Anesthesia Perspective.     .

## 2023-08-02 NOTE — H&P
"    History and Physical      Chief complaints: Requesting screening colonscopy    History of Presenting Illness    Patient requesting colonoscopy.  Patient denies any abdominal pain, weight loss or blood in the stool.  There is no family history of colon cancer.    Review of Systems   Constitutional: Negative for fever and appetite change.   HENT: Negative for sore throat, trouble swallowing and neck pain.   Eyes: Negative for photophobia and visual disturbance.   Respiratory: Negative for wheezing.   Cardiovascular: Negative for chest pain and palpitations.   Gastrointestinal: See HPI for details   Musculoskeletal: Negative for joint swelling and arthralgias.   Skin: Negative for rash and wound.   Neurological: Negative for dizziness, tremors and weakness.   Hematological: Negative.   Psychiatric/Behavioral: Negative for suicidal ideas and behavioral problems.     Past Medical History:   Diagnosis Date    Allergic rhinitis 2016    trial of daily allegra or any antihistamine- avoid "D" products due to high blood pressure    Benign essential hypertension 2012    Family history of stroke 2016    Hyperlipidemia 2016    Migraine syndrome     Obesity (BMI 30.0-34.9) 2016    Positive hepatitis C antibody test 2017    Vitamin D insufficiency 2017       Past Surgical History:   Procedure Laterality Date    BILATERAL OOPHORECTOMY      BREAST BIOPSY Left      SECTION      COLONOSCOPY N/A 2018    Procedure: COLONOSCOPY Golytely;  Surgeon: Jo-Ann Simon MD;  Location: [facility];  Service: Endoscopy;  Laterality: N/A;    HYSTERECTOMY      endometriosis    OOPHORECTOMY         Family History   Problem Relation Age of Onset    Breast cancer Mother 60    Heart disease Mother     Stroke Mother     Hypertension Mother     Glaucoma Mother     Thyroid disease Mother     Osteoporosis Mother     Cancer Mother     Stroke Father     Schizophrenia Father     Pancreatitis Father         " viral    Migraines Father     Migraines Sister     Breast cancer Sister 60    Ovarian cancer Maternal Grandmother 65    Ovarian cancer Other 61       Social History     Socioeconomic History    Marital status:    Tobacco Use    Smoking status: Never    Smokeless tobacco: Never   Substance and Sexual Activity    Alcohol use: Yes     Alcohol/week: 5.0 standard drinks of alcohol     Types: 5 Glasses of wine per week     Comment: couple of glasses per week    Drug use: No    Sexual activity: Yes     Partners: Male     Comment:        Current Facility-Administered Medications   Medication Dose Route Frequency Provider Last Rate Last Admin    0.9%  NaCl infusion   Intravenous Continuous Shell Osman MD   New Bag at 08/02/23 0817       Review of patient's allergies indicates:   Allergen Reactions    Morphine      Other reaction(s): Itching       Objective:      Vitals:    08/02/23 0744   BP: (!) 142/68   Pulse: 83   Resp: 20   Temp: 98 °F (36.7 °C)     Physical Exam   Constitutional: Patient is oriented to person, place, and time. Appears well-nourished.   HENT:   Mouth/Throat: Oropharynx is clear and moist.   Eyes: Pupils are equal, round, and reactive to light.   Neck: Neck supple.   Cardiovascular: Normal heart sounds.   Pulmonary/Chest: Effort normal and breath sounds normal.   Abdominal: Soft. Exhibits no mass. There is no tenderness. There is no guarding.   Musculoskeletal: Normal range of motion.   Lymphadenopathy: Has no cervical adenopathy.   Neurological:Alert and oriented to person, place, and time.   Skin: Skin is warm. No rash noted.   Psychiatric: Has a normal mood and affect.     Assessment:  Colon cancer screening    Plan:  Colonoscopy       I have reviewed the patient's medical history in detail and updated the computerized patient record

## 2023-08-02 NOTE — PROVATION PATIENT INSTRUCTIONS
Discharge Summary/Instructions after an Endoscopic Procedure  Patient Name: Esther Adams  Patient MRN: [id number]  Patient YOB: 1960 Wednesday, August 2, 2023  Jo-Ann Simon MD  Dear patient,  As a result of recent federal legislation (The Federal Cures Act), you may   receive lab or pathology results from your procedure in your [facility]   account before your physician is able to contact you. Your physician or   their representative will relay the results to you with their   recommendations at their soonest availability.  Thank you,  RESTRICTIONS:  During your procedure today, you received medications for sedation.  These   medications may affect your judgment, balance and coordination.  Therefore,   for 24 hours, you have the following restrictions:   - DO NOT drive a car, operate machinery, make legal/financial decisions,   sign important papers or drink alcohol.    ACTIVITY:  Today: no heavy lifting, straining or running due to procedural   sedation/anesthesia.  The following day: return to full activity including work.  DIET:  Eat and drink normally unless instructed otherwise.     TREATMENT FOR COMMON SIDE EFFECTS:  - Mild abdominal pain, nausea, belching, bloating or excessive gas:  rest,   eat lightly and use a heating pad.  - Sore Throat: treat with throat lozenges and/or gargle with warm salt   water.  - Because air was used during the procedure, expelling large amounts of air   from your rectum or belching is normal.  - If a bowel prep was taken, you may not have a bowel movement for 1-3 days.    This is normal.  SYMPTOMS TO WATCH FOR AND REPORT TO YOUR PHYSICIAN:  1. Abdominal pain or bloating, other than gas cramps.  2. Chest pain.  3. Back pain.  4. Signs of infection such as: chills or fever occurring within 24 hours   after the procedure.  5. Rectal bleeding, which would show as bright red, maroon, or black stools.   (A tablespoon of blood from the rectum is not serious,  especially if   hemorrhoids are present.)  6. Vomiting.  7. Weakness or dizziness.  GO DIRECTLY TO THE NEAREST EMERGENCY ROOM IF YOU HAVE ANY OF THE FOLLOWING:      Difficulty breathing              Chills and/or fever over 101 F   Persistent vomiting and/or vomiting blood   Severe abdominal pain   Severe chest pain   Black, tarry stools   Bleeding- more than one tablespoon   Any other symptom or condition that you feel may need urgent attention  Your doctor recommends these additional instructions:  If any biopsies were taken, your doctors clinic will contact you in 1 to 2   weeks with any results.  - Discharge patient to home.   - Repeat colonoscopy in 5 years for screening purposes.  For questions, problems or results please call your physician - Jo-Ann Simon MD at Work:  [phone].  [facility] can be reached at [phone]     IF A COMPLICATION OR EMERGENCY SITUATION ARISES AND YOU ARE UNABLE TO REACH   YOUR PHYSICIAN - GO DIRECTLY TO THE EMERGENCY ROOM.  Jo-Ann Simon MD  8/2/2023 9:22:32 AM  This report has been verified and signed electronically.  Dear patient,  As a result of recent federal legislation (The Federal Cures Act), you may   receive lab or pathology results from your procedure in your [facility]   account before your physician is able to contact you. Your physician or   their representative will relay the results to you with their   recommendations at their soonest availability.  Thank you,  PROVATION

## 2023-08-02 NOTE — ANESTHESIA POSTPROCEDURE EVALUATION
Anesthesia Post Evaluation    Patient: Esther Adams    Procedure(s) Performed: Procedure(s) (LRB):  COLONOSCOPY (N/A)    Final Anesthesia Type: general      Patient location during evaluation: GI PACU  Patient participation: Yes- Able to Participate  Level of consciousness: awake and alert, oriented and awake  Post-procedure vital signs: reviewed and stable  Airway patency: patent    PONV status at discharge: No PONV  Anesthetic complications: no      Cardiovascular status: blood pressure returned to baseline  Respiratory status: unassisted, spontaneous ventilation and room air  Hydration status: euvolemic  Follow-up not needed.          Vitals Value Taken Time   /58 08/02/23 0948   Temp 36.5 °C (97.7 °F) 08/02/23 0915   Pulse 54 08/02/23 0948   Resp 15 08/02/23 0948   SpO2 98 % 08/02/23 0948         No case tracking events are documented in the log.      Pain/Amauri Score: Amauri Score: 10 (8/2/2023  9:48 AM)

## 2023-08-03 LAB
FINAL PATHOLOGIC DIAGNOSIS: NORMAL
GROSS: NORMAL
Lab: NORMAL

## 2023-08-07 ENCOUNTER — PATIENT MESSAGE (OUTPATIENT)
Dept: GASTROENTEROLOGY | Facility: CLINIC | Age: 63
End: 2023-08-07
Payer: COMMERCIAL

## 2023-08-17 ENCOUNTER — LAB VISIT (OUTPATIENT)
Dept: LAB | Facility: HOSPITAL | Age: 63
End: 2023-08-17
Payer: COMMERCIAL

## 2023-08-17 DIAGNOSIS — R73.03 PREDIABETES: ICD-10-CM

## 2023-08-17 DIAGNOSIS — Z79.899 MEDICATION MANAGEMENT: ICD-10-CM

## 2023-08-17 LAB
ALBUMIN SERPL BCP-MCNC: 4 G/DL (ref 3.5–5.2)
ALP SERPL-CCNC: 74 U/L (ref 55–135)
ALT SERPL W/O P-5'-P-CCNC: 18 U/L (ref 10–44)
ANION GAP SERPL CALC-SCNC: 12 MMOL/L (ref 8–16)
AST SERPL-CCNC: 18 U/L (ref 10–40)
BILIRUB SERPL-MCNC: 0.2 MG/DL (ref 0.1–1)
BUN SERPL-MCNC: 12 MG/DL (ref 8–23)
CALCIUM SERPL-MCNC: 9.3 MG/DL (ref 8.7–10.5)
CHLORIDE SERPL-SCNC: 103 MMOL/L (ref 95–110)
CO2 SERPL-SCNC: 25 MMOL/L (ref 23–29)
CREAT SERPL-MCNC: 1.1 MG/DL (ref 0.5–1.4)
EST. GFR  (NO RACE VARIABLE): 56 ML/MIN/1.73 M^2
ESTIMATED AVG GLUCOSE: 117 MG/DL (ref 68–131)
GLUCOSE SERPL-MCNC: 111 MG/DL (ref 70–110)
HBA1C MFR BLD: 5.7 % (ref 4–5.6)
POTASSIUM SERPL-SCNC: 3.7 MMOL/L (ref 3.5–5.1)
PROT SERPL-MCNC: 7.2 G/DL (ref 6–8.4)
SODIUM SERPL-SCNC: 140 MMOL/L (ref 136–145)

## 2023-08-17 PROCEDURE — 83036 HEMOGLOBIN GLYCOSYLATED A1C: CPT | Performed by: FAMILY MEDICINE

## 2023-08-17 PROCEDURE — 36415 COLL VENOUS BLD VENIPUNCTURE: CPT | Performed by: FAMILY MEDICINE

## 2023-08-17 PROCEDURE — 80053 COMPREHEN METABOLIC PANEL: CPT | Performed by: FAMILY MEDICINE

## 2023-08-29 DIAGNOSIS — I10 BENIGN ESSENTIAL HYPERTENSION: ICD-10-CM

## 2023-08-30 RX ORDER — LISINOPRIL 20 MG/1
20 TABLET ORAL DAILY
Qty: 90 TABLET | Refills: 2 | Status: SHIPPED | OUTPATIENT
Start: 2023-08-30

## 2023-08-30 NOTE — TELEPHONE ENCOUNTER
No care due was identified.  [facility] Embedded Care Due Messages. Reference number: [id number].   8/29/2023 9:18:34 PM CDT

## 2023-08-30 NOTE — TELEPHONE ENCOUNTER
Refill Decision Note   Esther Adams  is requesting a refill authorization.  Brief Assessment and Rationale for Refill:  Approve     Medication Therapy Plan:         Comments:     Note composed:11:44 AM 08/30/2023

## 2023-09-05 ENCOUNTER — OFFICE VISIT (OUTPATIENT)
Dept: FAMILY MEDICINE | Facility: CLINIC | Age: 63
End: 2023-09-05
Payer: COMMERCIAL

## 2023-09-05 ENCOUNTER — TELEPHONE (OUTPATIENT)
Dept: FAMILY MEDICINE | Facility: CLINIC | Age: 63
End: 2023-09-05

## 2023-09-05 VITALS — WEIGHT: 179.44 LBS | BODY MASS INDEX: 32.82 KG/M2

## 2023-09-05 DIAGNOSIS — G43.909 MIGRAINE SYNDROME: ICD-10-CM

## 2023-09-05 DIAGNOSIS — Z79.899 MEDICATION MANAGEMENT: ICD-10-CM

## 2023-09-05 DIAGNOSIS — R73.03 PREDIABETES: Primary | ICD-10-CM

## 2023-09-05 DIAGNOSIS — I10 BENIGN ESSENTIAL HYPERTENSION: ICD-10-CM

## 2023-09-05 DIAGNOSIS — Z79.82 LONG-TERM USE OF ASPIRIN THERAPY: ICD-10-CM

## 2023-09-05 DIAGNOSIS — E55.9 VITAMIN D INSUFFICIENCY: ICD-10-CM

## 2023-09-05 DIAGNOSIS — E78.5 HYPERLIPIDEMIA, UNSPECIFIED HYPERLIPIDEMIA TYPE: ICD-10-CM

## 2023-09-05 DIAGNOSIS — E66.3 OVERWEIGHT (BMI 25.0-29.9): ICD-10-CM

## 2023-09-05 DIAGNOSIS — E66.09 CLASS 1 OBESITY DUE TO EXCESS CALORIES WITH SERIOUS COMORBIDITY AND BODY MASS INDEX (BMI) OF 33.0 TO 33.9 IN ADULT: ICD-10-CM

## 2023-09-05 PROCEDURE — 1159F PR MEDICATION LIST DOCUMENTED IN MEDICAL RECORD: ICD-10-PCS | Mod: CPTII,95,, | Performed by: FAMILY MEDICINE

## 2023-09-05 PROCEDURE — 3044F PR MOST RECENT HEMOGLOBIN A1C LEVEL <7.0%: ICD-10-PCS | Mod: CPTII,95,, | Performed by: FAMILY MEDICINE

## 2023-09-05 PROCEDURE — 1160F PR REVIEW ALL MEDS BY PRESCRIBER/CLIN PHARMACIST DOCUMENTED: ICD-10-PCS | Mod: CPTII,95,, | Performed by: FAMILY MEDICINE

## 2023-09-05 PROCEDURE — 3044F HG A1C LEVEL LT 7.0%: CPT | Mod: CPTII,95,, | Performed by: FAMILY MEDICINE

## 2023-09-05 PROCEDURE — 4010F PR ACE/ARB THEARPY RXD/TAKEN: ICD-10-PCS | Mod: CPTII,95,, | Performed by: FAMILY MEDICINE

## 2023-09-05 PROCEDURE — 1160F RVW MEDS BY RX/DR IN RCRD: CPT | Mod: CPTII,95,, | Performed by: FAMILY MEDICINE

## 2023-09-05 PROCEDURE — 99214 OFFICE O/P EST MOD 30 MIN: CPT | Mod: 95,,, | Performed by: FAMILY MEDICINE

## 2023-09-05 PROCEDURE — 99214 PR OFFICE/OUTPT VISIT, EST, LEVL IV, 30-39 MIN: ICD-10-PCS | Mod: 95,,, | Performed by: FAMILY MEDICINE

## 2023-09-05 PROCEDURE — 4010F ACE/ARB THERAPY RXD/TAKEN: CPT | Mod: CPTII,95,, | Performed by: FAMILY MEDICINE

## 2023-09-05 PROCEDURE — 1159F MED LIST DOCD IN RCRD: CPT | Mod: CPTII,95,, | Performed by: FAMILY MEDICINE

## 2023-09-05 NOTE — PROGRESS NOTES
Office Visit    Patient Name: Esther Adams    : 1960  MRN: [id number]    Subjective:  Esther is a 63 y.o. female who presents today for:    No chief complaint on file.    The patient location is: her work   The chief complaint leading to consultation is: FOLLOW UP LABS/ A1c     Visit type: audiovisual    Face to Face time with patient: START 139   26 minutes of total time spent on the encounter, which includes face to face time and non-face to face time preparing to see the patient (eg, review of tests), Obtaining and/or reviewing separately obtained history, Documenting clinical information in the electronic or other health record, Independently interpreting results (not separately reported) and communicating results to the patient/family/caregiver, or Care coordination (not separately reported).     Each patient to whom he or she provides medical services by telemedicine is:  (1) informed of the relationship between the physician and patient and the respective role of any other health care provider with respect to management of the patient; and (2) notified that he or she may decline to receive medical services by telemedicine and may withdraw from such care at any time.    Notes:     Physical 3/31/23  Follow up visit with me 23  ENT 23 Asymmetric Hearing Loss, MRI Imaging Unremarkable     Esther Adams presents today for virtual visit to discuss treatment of Pre-Diabetes.    Most recent labs 23 show further improvement in A1c to 5.7 with CMP unremarkable except slight decline in EGFR to 56 from previously normal.   Prior labs 23 showed A1c of 5.8--stable over the last 3 years, normal CMP, Lipid with LDL of 93.   SHE IS NOW TAKING METFORMIN 1000XR DAILY-- feeling a bit lea faster.   GI issues have leveled out-- normal BMs, no n/v.   No BP concerns     Chronic health conditions including obesity, hypertension, hyperlipidemia, history of colitis s/p GI evaluation and  colonoscopy 2/2018, chronic migraines.     She is the Director of Educational and Professional Development, [employer], for [employer].   This is a challenging position with significant stress-- she has multiple responsibilities, has to fill multiple rolls, and often fatigued by the end of the day  Struggles to give lifestyle changes adequate attention and open to medication to help with weight loss/treatment of prediabetes.       General lifestyle habits are as follows:    Diet: FAIR, trying to meal plan more-- bring lunch to work  Exercise: fair: doing some more walking but hasn't gone back to the gym and trying to use more home elliptical or swimming   Sleep: FAIR-- Elavil helps with sleep and migraines, but her sleep is still fairly interrupted  Weight: subjectively feels weight is improving some     Immunizations: TDaP 9/7/2016, FLU SHOT 10/4/22,  SHINGRIX ADVISED, COVID 19 completed 1/6/21 w/ Pfizer BOOSTER 1/27/22 & OMICRON BOOSTER ADVISED     Screening Tests: COLONOSCOPY 8/2/23- Repeat 5 years, mammogram 12/15/22--> Diagnostic L mammogram and biopsy advised & L Breast diag mammo due 6/2022 then screening mammo 12/15/22 ok--REPEAT 1 year (scheduled FOR 1/29/24), DEXA 10/2/19-- NORMAL AND REPEAT AT AGE 64, no longer needs paps-- EUGENIE/BSO secondary to endometriosis, Hep C Ab + 9/11/17 but s/p 2 negative viral loads (most recently 9/21/2018)        PAST MEDICAL HISTORY, SURGICAL/SOCIAL/FAMILY HISTORY REVIEWED AS PER CHART, WITH PERTINENT FINDINGS INCLUDED IN HISTORY SECTION OF NOTE.     Current Medications    Medication List with Changes/Refills   Current Medications    AMITRIPTYLINE (ELAVIL) 50 MG TABLET    Take 1-2 tablets by mouth nightly for sleep and migraine prevention    ASPIRIN (ECOTRIN) 81 MG EC TABLET    Take 1 tablet (81 mg total) by mouth once daily. 1 Tablet, Delayed Release (E.C.) Oral Every day    BUTALBITAL-ACETAMINOPHEN-CAFFEINE -40 MG (FIORICET, ESGIC) -40 MG PER TABLET    Take 1  tablet by mouth every 4 (four) hours as needed for Pain or Headaches.    CETIRIZINE (ZYRTEC) 10 MG TABLET    Take 1 tablet (10 mg total) by mouth once daily.    CHOLECALCIFEROL, VITAMIN D3, 125 MCG (5,000 UNIT) CAPSULE    Take 1 capsule (5,000 Units total) by mouth daily with breakfast.    ESTRADIOL (ESTRACE) 0.01 % (0.1 MG/GRAM) VAGINAL CREAM    Place 1 g vaginally twice a week. Use cream nightly for very 1st 2 weeks    FLUTICASONE PROPIONATE (FLONASE) 50 MCG/ACTUATION NASAL SPRAY    2 sprays (100 mcg total) by Each Nostril route once daily.    HYDROCHLOROTHIAZIDE (HYDRODIURIL) 25 MG TABLET    Take 1 tablet (25 mg total) by mouth once daily.    LISINOPRIL (PRINIVIL,ZESTRIL) 20 MG TABLET    Take 1 tablet (20 mg total) by mouth once daily.    MAGNESIUM OXIDE (MAG-OX) 400 MG (241.3 MG MAGNESIUM) TABLET    Take 1 tablet (400 mg total) by mouth once daily.    METFORMIN (GLUCOPHAGE-XR) 500 MG ER 24HR TABLET    Take 2 tablets (1,000 mg total) by mouth daily with dinner or evening meal.    POTASSIUM CHLORIDE SA (KLOR-CON M20) 20 MEQ TABLET    Take 1 tablet (20 mEq total) by mouth once daily.    SIMVASTATIN (ZOCOR) 20 MG TABLET    Take 1 tablet (20 mg total) by mouth once daily.       Allergies   Review of patient's allergies indicates:   Allergen Reactions    Morphine      Other reaction(s): Itching         Review of Systems (Pertinent positives)  Review of Systems   Constitutional:  Negative for activity change and unexpected weight change.   HENT:  Negative for hearing loss, rhinorrhea and trouble swallowing.    Eyes:  Negative for discharge and visual disturbance.   Respiratory:  Negative for chest tightness and wheezing.    Cardiovascular:  Negative for chest pain and palpitations.   Gastrointestinal:  Negative for blood in stool, constipation, diarrhea and vomiting.   Endocrine: Negative for polydipsia and polyuria.   Genitourinary:  Negative for difficulty urinating, dysuria, hematuria and menstrual problem.    Musculoskeletal:  Negative for arthralgias, joint swelling and neck pain.   Neurological:  Negative for weakness and headaches.   Psychiatric/Behavioral:  Negative for confusion and dysphoric mood.        LMP 01/01/2002 (Approximate) Comment: 15-18 yrs ago    Physical Exam  Vitals reviewed.   Constitutional:       General: She is not in acute distress.     Appearance: Normal appearance. She is well-developed.   HENT:      Head: Normocephalic and atraumatic.   Eyes:      Conjunctiva/sclera: Conjunctivae normal.   Pulmonary:      Effort: Pulmonary effort is normal.   Neurological:      Mental Status: She is alert and oriented to person, place, and time.   Psychiatric:         Mood and Affect: Mood normal.         Behavior: Behavior normal.           Assessment/Plan:  Esther Adams is a 63 y.o. female who presents today for :        ICD-10-CM ICD-9-CM    1. Prediabetes  R73.03 790.29 Hemoglobin A1C      Comprehensive Metabolic Panel      Lipid Panel      CBC Auto Differential      TSH      Vitamin D      Magnesium      Vitamin B12      2. Class 1 obesity due to excess calories with serious comorbidity and body mass index (BMI) of 33.0 to 33.9 in adult  E66.09 278.00     Z68.33 V85.33       3. Benign essential hypertension  I10 401.1 Hemoglobin A1C      Comprehensive Metabolic Panel      Lipid Panel      CBC Auto Differential      TSH      Vitamin D      Magnesium      Vitamin B12      4. Overweight (BMI 25.0-29.9)  E66.3 278.02       5. Hyperlipidemia, unspecified hyperlipidemia type  E78.5 272.4 Hemoglobin A1C      Comprehensive Metabolic Panel      Lipid Panel      CBC Auto Differential      TSH      Vitamin D      Magnesium      Vitamin B12      6. Long-term use of aspirin therapy  Z79.82 V58.66       7. Vitamin D insufficiency  E55.9 268.9 Vitamin D      8. Medication management  Z79.899 V58.69 Hemoglobin A1C      Comprehensive Metabolic Panel      Lipid Panel      CBC Auto Differential      TSH      Vitamin  D      Magnesium      Vitamin B12      9. Migraine syndrome  G43.909 346.00         PREDIABETES ASSOCIATED WITH OBESITY: Increased Metformin to 1000XR DAILY WITH MEAL FOR TREATMENT OF PREDIABETES AND HELP WITH WEIGHT LOSS.   A1C IMPROVED TO 5.7. CONTINUE CURRENT REGIMENT WITH RECHECK LABS AND A1C IN 6 MONTHS  Don't have updated weight but subjectively feels she has lost some and metformin is helping with portion control.   DISCUSSED IMPORTANCE OF ONGOING ATTENTION TO HEALTHY DIET AND EXERCISE-- Previously advised HIIT STYLE WORKOUTS w/ strength training to help build muscle and improve metabolic rate.    LABS PRIOR TO VV IN 3-4 MONTHS TO RECHECK A1C AND DISCUSS EFFECTIVENESS OF METFORMIN FOR PREDIABETES AND WEIGHT MANAGEMENT.     BP CONTROLLED--DIGITAL MED    Still struggling with sleep issues, but WISHES TO STAY ON ELAVIL FOR NOW-- IS HELPING HEADACHES RELIABLY.      MAMMOGRAM SCHEDULED 1/29/24  HAD COLONOSCOPY 8/2/23- Repeat 5 years       There are no Patient Instructions on file for this visit.      Follow up in about 6 months (around 3/5/2024) for to follow up on lab results, return as needed for new concerns.

## 2023-11-02 ENCOUNTER — OFFICE VISIT (OUTPATIENT)
Dept: OPTOMETRY | Facility: CLINIC | Age: 63
End: 2023-11-02
Payer: COMMERCIAL

## 2023-11-02 DIAGNOSIS — H52.4 REGULAR ASTIGMATISM OF BOTH EYES WITH PRESBYOPIA: ICD-10-CM

## 2023-11-02 DIAGNOSIS — Z01.00 EYE EXAM, ROUTINE: Primary | ICD-10-CM

## 2023-11-02 DIAGNOSIS — H52.223 REGULAR ASTIGMATISM OF BOTH EYES WITH PRESBYOPIA: ICD-10-CM

## 2023-11-02 PROCEDURE — 92004 COMPRE OPH EXAM NEW PT 1/>: CPT | Mod: S$GLB,,, | Performed by: OPTOMETRIST

## 2023-11-02 PROCEDURE — 99213 OFFICE O/P EST LOW 20 MIN: CPT | Mod: PBBFAC | Performed by: OPTOMETRIST

## 2023-11-02 PROCEDURE — 99999 PR PBB SHADOW E&M-EST. PATIENT-LVL III: ICD-10-PCS | Mod: PBBFAC,,, | Performed by: OPTOMETRIST

## 2023-11-02 PROCEDURE — 92015 PR REFRACTION: ICD-10-PCS | Mod: S$GLB,,, | Performed by: OPTOMETRIST

## 2023-11-02 PROCEDURE — 92015 DETERMINE REFRACTIVE STATE: CPT | Mod: S$GLB,,, | Performed by: OPTOMETRIST

## 2023-11-02 PROCEDURE — 92004 PR EYE EXAM, NEW PATIENT,COMPREHESV: ICD-10-PCS | Mod: S$GLB,,, | Performed by: OPTOMETRIST

## 2023-11-02 PROCEDURE — 99999 PR PBB SHADOW E&M-EST. PATIENT-LVL III: CPT | Mod: PBBFAC,,, | Performed by: OPTOMETRIST

## 2023-11-02 NOTE — PROGRESS NOTES
HPI    Last visit was elsewhere longer than a year ago.   Old distance glasses do not work anymore for near and distance for year   gradually. Interested in new glasses. No flashes or floaters.  Eye meds: None  Last edited by Rupali Umana on 11/2/2023  2:52 PM.            Assessment /Plan     For exam results, see Encounter Report.    Eye exam, routine  -Eyemed vision exam today  -Monitor GLC suspect, Cataracts OU    Regular astigmatism of both eyes with presbyopia  Eyeglass Final Rx       Eyeglass Final Rx         Sphere Cylinder Axis Add    Right +1.00 Sphere  +1.50    Left +0.50 +1.25 150 +1.50      Type: computer/near    Expiration Date: 11/2/2024                      RTC 1 yr

## 2023-11-07 DIAGNOSIS — E55.9 VITAMIN D INSUFFICIENCY: ICD-10-CM

## 2023-11-07 RX ORDER — CHOLECALCIFEROL (VITAMIN D3) 125 MCG
5000 CAPSULE ORAL
Qty: 100 CAPSULE | Refills: 4 | Status: SHIPPED | OUTPATIENT
Start: 2023-11-07

## 2023-11-30 DIAGNOSIS — K59.00 CONSTIPATION, UNSPECIFIED CONSTIPATION TYPE: ICD-10-CM

## 2023-11-30 NOTE — TELEPHONE ENCOUNTER
Care Due:                  Date            Visit Type   Department     Provider  --------------------------------------------------------------------------------                                ESTABLISHED                              PATIENT -    [facility] FAM[facility]  Last Visit: 09-      VIRTUAL      MEDICINE       Shanta Perez                               -                              PRIMARY      [facility]  Next Visit: 03-      CARE ([facility])   MEDICINE       Shanta Perez                                                            Last  Test          Frequency    Reason                     Performed    Due Date  --------------------------------------------------------------------------------    HBA1C.......  6 months...  metFORMIN................  08- 02-    [facility] Embedded Care Due Messages. Reference number: [id number].   11/30/2023 4:55:31 PM CST

## 2023-12-01 RX ORDER — LANOLIN ALCOHOL/MO/W.PET/CERES
400 CREAM (GRAM) TOPICAL DAILY
Qty: 120 TABLET | Refills: 4 | Status: SHIPPED | OUTPATIENT
Start: 2023-12-01

## 2023-12-26 ENCOUNTER — TELEPHONE (OUTPATIENT)
Dept: OPTOMETRY | Facility: CLINIC | Age: 63
End: 2023-12-26
Payer: COMMERCIAL

## 2023-12-26 NOTE — TELEPHONE ENCOUNTER
Updated full Rx  Eyeglass Final Rx       Eyeglass Final Rx         Sphere Cylinder Axis Add    Right Manley Sphere  +2.50    Left -0.50 +1.25 150 +2.50      Type: PAL    Expiration Date: 12/26/2024              Eyeglass Final Rx #2         Sphere Cylinder Axis Add    Right +1.00 Sphere  +1.50    Left +0.50 +1.25 150 +1.50      Type: computer/near    Expiration Date: 12/26/2024

## 2024-01-04 DIAGNOSIS — E78.5 HYPERLIPIDEMIA, UNSPECIFIED HYPERLIPIDEMIA TYPE: ICD-10-CM

## 2024-01-04 RX ORDER — SIMVASTATIN 20 MG/1
20 TABLET, FILM COATED ORAL DAILY
Qty: 90 TABLET | Refills: 4 | Status: SHIPPED | OUTPATIENT
Start: 2024-01-04

## 2024-01-04 NOTE — TELEPHONE ENCOUNTER
No care due was identified.  [facility] Embedded Care Due Messages. Reference number: [id number].   1/04/2024 1:57:58 PM CST

## 2024-01-05 NOTE — TELEPHONE ENCOUNTER
Refill Routing Note   Medication(s) are not appropriate for processing by [facility] for the following reason(s):        Other  Last dispensed on  3/10/23 for a 90 day supply.    [facility] action(s):  Defer               Appointments  past 12m or future 3m with PCP    Date Provider   Last Visit   9/5/2023 Shanta Perez MD   Next Visit   3/11/2024 Shanta Perez MD   ED visits in past 90 days: 0        Note composed:6:46 PM 01/04/2024

## 2024-01-29 ENCOUNTER — HOSPITAL ENCOUNTER (OUTPATIENT)
Dept: RADIOLOGY | Facility: HOSPITAL | Age: 64
Discharge: HOME | End: 2024-01-29
Attending: FAMILY MEDICINE
Payer: COMMERCIAL

## 2024-01-29 DIAGNOSIS — Z12.31 SCREENING MAMMOGRAM FOR BREAST CANCER: ICD-10-CM

## 2024-01-29 PROCEDURE — 77067 SCR MAMMO BI INCL CAD: CPT | Mod: TC

## 2024-01-29 PROCEDURE — 77063 BREAST TOMOSYNTHESIS BI: CPT | Mod: 26,,, | Performed by: RADIOLOGY

## 2024-01-29 PROCEDURE — 77067 SCR MAMMO BI INCL CAD: CPT | Mod: 26,,, | Performed by: RADIOLOGY

## 2024-03-01 ENCOUNTER — LAB VISIT (OUTPATIENT)
Dept: LAB | Facility: HOSPITAL | Age: 64
End: 2024-03-01
Attending: FAMILY MEDICINE
Payer: COMMERCIAL

## 2024-03-01 DIAGNOSIS — E78.5 HYPERLIPIDEMIA, UNSPECIFIED HYPERLIPIDEMIA TYPE: ICD-10-CM

## 2024-03-01 DIAGNOSIS — E55.9 VITAMIN D INSUFFICIENCY: ICD-10-CM

## 2024-03-01 DIAGNOSIS — Z79.899 MEDICATION MANAGEMENT: ICD-10-CM

## 2024-03-01 DIAGNOSIS — I10 BENIGN ESSENTIAL HYPERTENSION: ICD-10-CM

## 2024-03-01 DIAGNOSIS — R73.03 PREDIABETES: ICD-10-CM

## 2024-03-01 LAB
25(OH)D3+25(OH)D2 SERPL-MCNC: 74 NG/ML (ref 30–96)
ALBUMIN SERPL BCP-MCNC: 3.9 G/DL (ref 3.5–5.2)
ALP SERPL-CCNC: 67 U/L (ref 55–135)
ALT SERPL W/O P-5'-P-CCNC: 22 U/L (ref 10–44)
ANION GAP SERPL CALC-SCNC: 13 MMOL/L (ref 8–16)
AST SERPL-CCNC: 21 U/L (ref 10–40)
BASOPHILS # BLD AUTO: 0.07 K/UL (ref 0–0.2)
BASOPHILS NFR BLD: 0.8 % (ref 0–1.9)
BILIRUB SERPL-MCNC: 0.3 MG/DL (ref 0.1–1)
BUN SERPL-MCNC: 16 MG/DL (ref 8–23)
CALCIUM SERPL-MCNC: 9.3 MG/DL (ref 8.7–10.5)
CHLORIDE SERPL-SCNC: 104 MMOL/L (ref 95–110)
CHOLEST SERPL-MCNC: 163 MG/DL (ref 120–199)
CHOLEST/HDLC SERPL: 3 {RATIO} (ref 2–5)
CO2 SERPL-SCNC: 24 MMOL/L (ref 23–29)
CREAT SERPL-MCNC: 1.1 MG/DL (ref 0.5–1.4)
DIFFERENTIAL METHOD BLD: NORMAL
EOSINOPHIL # BLD AUTO: 0.2 K/UL (ref 0–0.5)
EOSINOPHIL NFR BLD: 2.4 % (ref 0–8)
ERYTHROCYTE [DISTWIDTH] IN BLOOD BY AUTOMATED COUNT: 13 % (ref 11.5–14.5)
EST. GFR  (NO RACE VARIABLE): 56 ML/MIN/1.73 M^2
ESTIMATED AVG GLUCOSE: 117 MG/DL (ref 68–131)
GLUCOSE SERPL-MCNC: 128 MG/DL (ref 70–110)
HBA1C MFR BLD: 5.7 % (ref 4–5.6)
HCT VFR BLD AUTO: 38.8 % (ref 37–48.5)
HDLC SERPL-MCNC: 54 MG/DL (ref 40–75)
HDLC SERPL: 33.1 % (ref 20–50)
HGB BLD-MCNC: 13.1 G/DL (ref 12–16)
IMM GRANULOCYTES # BLD AUTO: 0.02 K/UL (ref 0–0.04)
IMM GRANULOCYTES NFR BLD AUTO: 0.2 % (ref 0–0.5)
LDLC SERPL CALC-MCNC: 84.6 MG/DL (ref 63–159)
LYMPHOCYTES # BLD AUTO: 2.8 K/UL (ref 1–4.8)
LYMPHOCYTES NFR BLD: 29.8 % (ref 18–48)
MAGNESIUM SERPL-MCNC: 1.8 MG/DL (ref 1.6–2.6)
MCH RBC QN AUTO: 29.7 PG (ref 27–31)
MCHC RBC AUTO-ENTMCNC: 33.8 G/DL (ref 32–36)
MCV RBC AUTO: 88 FL (ref 82–98)
MONOCYTES # BLD AUTO: 0.4 K/UL (ref 0.3–1)
MONOCYTES NFR BLD: 4.2 % (ref 4–15)
NEUTROPHILS # BLD AUTO: 5.8 K/UL (ref 1.8–7.7)
NEUTROPHILS NFR BLD: 62.6 % (ref 38–73)
NONHDLC SERPL-MCNC: 109 MG/DL
NRBC BLD-RTO: 0 /100 WBC
PLATELET # BLD AUTO: 347 K/UL (ref 150–450)
PMV BLD AUTO: 10.3 FL (ref 9.2–12.9)
POTASSIUM SERPL-SCNC: 3.6 MMOL/L (ref 3.5–5.1)
PROT SERPL-MCNC: 7.3 G/DL (ref 6–8.4)
RBC # BLD AUTO: 4.41 M/UL (ref 4–5.4)
SODIUM SERPL-SCNC: 141 MMOL/L (ref 136–145)
TRIGL SERPL-MCNC: 122 MG/DL (ref 30–150)
TSH SERPL DL<=0.005 MIU/L-ACNC: 1.8 UIU/ML (ref 0.4–4)
VIT B12 SERPL-MCNC: 236 PG/ML (ref 210–950)
WBC # BLD AUTO: 9.26 K/UL (ref 3.9–12.7)

## 2024-03-01 PROCEDURE — 80061 LIPID PANEL: CPT | Performed by: FAMILY MEDICINE

## 2024-03-01 PROCEDURE — 82306 VITAMIN D 25 HYDROXY: CPT | Performed by: FAMILY MEDICINE

## 2024-03-01 PROCEDURE — 36415 COLL VENOUS BLD VENIPUNCTURE: CPT | Performed by: FAMILY MEDICINE

## 2024-03-01 PROCEDURE — 82607 VITAMIN B-12: CPT | Performed by: FAMILY MEDICINE

## 2024-03-01 PROCEDURE — 83036 HEMOGLOBIN GLYCOSYLATED A1C: CPT | Performed by: FAMILY MEDICINE

## 2024-03-01 PROCEDURE — 84443 ASSAY THYROID STIM HORMONE: CPT | Performed by: FAMILY MEDICINE

## 2024-03-01 PROCEDURE — 85025 COMPLETE CBC W/AUTO DIFF WBC: CPT | Performed by: FAMILY MEDICINE

## 2024-03-01 PROCEDURE — 83735 ASSAY OF MAGNESIUM: CPT | Performed by: FAMILY MEDICINE

## 2024-03-01 PROCEDURE — 80053 COMPREHEN METABOLIC PANEL: CPT | Performed by: FAMILY MEDICINE

## 2024-03-04 ENCOUNTER — PATIENT MESSAGE (OUTPATIENT)
Dept: FAMILY MEDICINE | Facility: CLINIC | Age: 64
End: 2024-03-04
Payer: COMMERCIAL

## 2024-03-05 NOTE — TELEPHONE ENCOUNTER
Please have her come in at 8 and NP student Samantha will be with me full day that day and can get started with her history / exam so that we will be sure she is out in time for her oral surgery appointment. thanks

## 2024-03-05 NOTE — TELEPHONE ENCOUNTER
Called pt and talked with her. She is ok with the appointment but would need to leave at exactly 9:15 to make it to her appointment with the oral surgeon. Pt is worried that she wont have enough time to make it. I explained to pt that Dr. Perez currently has a student with her and I will ask if she thinks the 8 am would be able to see the student and have pt come in at 8 am and see Dr. Perez so pt may leave early. Told pt that I would run the idea by Dr. Perez but if the first pt of the day isnt a good candidate for the student then I would recommend rescheduling.

## 2024-03-10 PROBLEM — E53.8 LOW SERUM VITAMIN B12: Status: ACTIVE | Noted: 2024-03-10

## 2024-03-10 PROBLEM — N18.31 CHRONIC KIDNEY DISEASE, STAGE 3A: Status: ACTIVE | Noted: 2024-03-10

## 2024-03-10 NOTE — PROGRESS NOTES
Office Visit    Patient Name: Esther Adams    : 1960  MRN: [id number]    Subjective:  Esther is a 63 y.o. female who presents today for:    Annual Exam    Physical 3/31/23  Most Recent OV w/ me 23  ENT 23 Asymmetric Hearing Loss, MRI Imaging Unremarkable  She has a fractured tooth root that she needs to have addressed by an oral surgeon-- she has lots of dental issues and an infection over the last year.     Esther Adams presents today for ANNUAL PHYSICAL with lab review and monitoring of chronic conditions.      Most recent labs 3/1/24 with A1c STABLE at 5.7, normal CMP except for glucose 128 & EGFR stable at 56, LDL 84, normal CBC/TSH 1.7, normal Vit D (74), Borderline LOW B12 of 236.     TAKING METFORMIN 1000XR DAILY-- feeling a bit lea faster.   GI issues have leveled out-- normal BMs, no n/v.   No BP concerns.     Chronic health conditions including obesity, hypertension, hyperlipidemia, history of colitis s/p GI evaluation and colonoscopy 2018 +23, chronic migraines.     She is the Director of Educational and Professional Development, [employer], for [employer].   This is a challenging position with significant stress-- she has multiple responsibilities, has to fill multiple roles, and often fatigued by the end of the day  Struggles to give lifestyle changes adequate attention and open to medication to help with weight loss/treatment of prediabetes.       General lifestyle habits are as follows:    Diet: FAIR, trying to meal plan more-- bring lunch to work, hydrate with water  Exercise: fair: doing some more walking but hasn't gone back to the gym and trying to use more home elliptical or swimming-- will do this when weather is warmer  Sleep: FAIR-- Elavil helps with sleep and migraines, but her sleep is still fairly interrupted-- Melatonin will help PRN  Weight:  DOWN ABOUT 5 LB IN THE LAST 6 MONTHS WITH IMPROVEMENT IN BMI 31.9, total weight loss of about 10 lb from her recent  peak weight and she is back to her baseline weight     Immunizations: TDaP 9/7/2016, FLU SHOT UTD 10/13/23,  SHINGRIX ADVISED, COVID 19 completed 1/6/21 w/ Pfizer BOOSTER 1/27/22 & SEASONAL UPDATED COVID VACCINE ADVISED, PREVNAR 20 @ 65.      Screening Tests: COLONOSCOPY 8/2/23- Repeat 5 years (8/2028), mammogram 1/29/24- Repeat 1 year(h/o previously abnormal L mammo), DEXA 10/2/19-- NORMAL AND REPEAT AT AGE 64, no longer needs paps-- EUGENIE/BSO secondary to endometriosis, Hep C Ab + 9/11/17 but s/p 2 negative viral loads (most recently 9/21/2018)      EYE EXAM: 11/2/23 Dr Hernandez-- Follow up 1 year Dr Hernandez, Dental-- Following up with Oral surgeon Today    PAST MEDICAL HISTORY, SURGICAL/SOCIAL/FAMILY HISTORY REVIEWED AS PER CHART, WITH PERTINENT FINDINGS INCLUDED IN HISTORY SECTION OF NOTE.     Current Medications    Medication List with Changes/Refills   New Medications    CYANOCOBALAMIN, VITAMIN B-12, 1,000 MCG SUBL    Place 1 lozenge under the tongue 3 (three) times a week.   Current Medications    AMITRIPTYLINE (ELAVIL) 50 MG TABLET    Take 1-2 tablets by mouth nightly for sleep and migraine prevention    AMOXICILLIN (AMOXIL) 500 MG CAPSULE    take 1 capsule by mouth every 6 hours    ASPIRIN (ECOTRIN) 81 MG EC TABLET    Take 1 tablet (81 mg total) by mouth once daily. 1 Tablet, Delayed Release (E.C.) Oral Every day    BUTALBITAL-ACETAMINOPHEN-CAFFEINE -40 MG (FIORICET, ESGIC) -40 MG PER TABLET    Take 1 tablet by mouth every 4 (four) hours as needed for Pain or Headaches.    CETIRIZINE (ZYRTEC) 10 MG TABLET    Take 1 tablet (10 mg total) by mouth once daily.    CHOLECALCIFEROL, VITAMIN D3, 125 MCG (5,000 UNIT) CAPSULE    Take 1 capsule (5,000 Units total) by mouth daily with breakfast.    ESTRADIOL (ESTRACE) 0.01 % (0.1 MG/GRAM) VAGINAL CREAM    Place 1 g vaginally twice a week. Use cream nightly for very 1st 2 weeks    FLUTICASONE PROPIONATE (FLONASE) 50 MCG/ACTUATION NASAL SPRAY    2 sprays (100 mcg  "total) by Each Nostril route once daily.    HYDROCHLOROTHIAZIDE (HYDRODIURIL) 25 MG TABLET    Take 1 tablet (25 mg total) by mouth once daily.    LISINOPRIL (PRINIVIL,ZESTRIL) 20 MG TABLET    Take 1 tablet (20 mg total) by mouth once daily.    MAGNESIUM OXIDE (MAG-OX) 400 MG (241.3 MG MAGNESIUM) TABLET    Take 1 tablet (400 mg total) by mouth once daily.    METFORMIN (GLUCOPHAGE-XR) 500 MG ER 24HR TABLET    Take 2 tablets (1,000 mg total) by mouth daily with dinner or evening meal.    METHYLPREDNISOLONE (MEDROL DOSEPACK) 4 MG TABLET    take as directed    POTASSIUM CHLORIDE SA (KLOR-CON M20) 20 MEQ TABLET    Take 1 tablet (20 mEq total) by mouth once daily.    SIMVASTATIN (ZOCOR) 20 MG TABLET    Take 1 tablet (20 mg total) by mouth once daily.       Allergies   Review of patient's allergies indicates:   Allergen Reactions    Morphine      Other reaction(s): Itching         Review of Systems (Pertinent positives)  Review of Systems   Constitutional:  Negative for unexpected weight change.   HENT:  Positive for dental problem.    Eyes:  Negative for visual disturbance.   Respiratory:  Negative for shortness of breath.    Cardiovascular:  Negative for chest pain.   Gastrointestinal:  Positive for constipation (overall stable).   Genitourinary:  Positive for frequency.   Allergic/Immunologic: Positive for environmental allergies.   Neurological:  Negative for dizziness and light-headedness.   Psychiatric/Behavioral:  Positive for sleep disturbance.        /64 (BP Location: Right arm, Patient Position: Sitting, BP Method: Large (Manual))   Pulse 88   Temp 98.1 °F (36.7 °C)   Ht 5' 2" (1.575 m)   Wt 79.1 kg (174 lb 6.1 oz)   LMP 01/01/2002 (Approximate) Comment: 15-18 yrs ago  SpO2 98%   BMI 31.90 kg/m²     Physical Exam  Vitals reviewed.   Constitutional:       General: She is not in acute distress.     Appearance: Normal appearance. She is well-developed.   HENT:      Head: Normocephalic and atraumatic.     "  Right Ear: Tympanic membrane and ear canal normal. Tympanic membrane is not erythematous or bulging.      Left Ear: Tympanic membrane and ear canal normal. Tympanic membrane is not erythematous or bulging.      Nose: Nose normal.      Mouth/Throat:      Mouth: Mucous membranes are moist.      Pharynx: No oropharyngeal exudate or posterior oropharyngeal erythema.   Eyes:      Extraocular Movements: Extraocular movements intact.      Conjunctiva/sclera: Conjunctivae normal.   Neck:      Thyroid: No thyroid mass or thyromegaly.      Vascular: No carotid bruit.   Cardiovascular:      Rate and Rhythm: Normal rate and regular rhythm.      Pulses:           Dorsalis pedis pulses are 2+ on the right side and 2+ on the left side.      Heart sounds: Normal heart sounds. No murmur heard.  Pulmonary:      Effort: Pulmonary effort is normal. No respiratory distress.      Breath sounds: Normal breath sounds.   Abdominal:      General: Bowel sounds are normal. There is no distension.      Palpations: Abdomen is soft. There is no mass.      Tenderness: There is no abdominal tenderness.   Musculoskeletal:         General: Normal range of motion.      Right lower leg: No edema.      Left lower leg: No edema.   Lymphadenopathy:      Cervical: No cervical adenopathy.   Skin:     General: Skin is warm and dry.      Findings: No rash.   Neurological:      General: No focal deficit present.      Mental Status: She is alert and oriented to person, place, and time.   Psychiatric:         Mood and Affect: Mood normal.         Behavior: Behavior normal.           Assessment/Plan:  Esther Adams is a 63 y.o. female who presents today for :        ICD-10-CM ICD-9-CM    1. Routine general medical examination at a health care facility  Z00.00 V70.0       2. Class 1 obesity due to excess calories with serious comorbidity and body mass index (BMI) of 33.0 to 33.9 in adult  E66.09 278.00     Z68.33 V85.33       3. Benign essential hypertension   I10 401.1 Comprehensive Metabolic Panel      Lipid Panel      CBC Auto Differential      TSH      4. Chronic kidney disease, stage 3a  N18.31 585.3 Comprehensive Metabolic Panel      CBC Auto Differential      5. Prediabetes  R73.03 790.29 Hemoglobin A1C      Comprehensive Metabolic Panel      Lipid Panel      6. Hyperlipidemia, unspecified hyperlipidemia type  E78.5 272.4 Comprehensive Metabolic Panel      Lipid Panel      7. Non-seasonal allergic rhinitis, unspecified trigger  J30.89 477.8       8. Vitamin D insufficiency  E55.9 268.9       9. Venous stasis  I87.8 459.81       10. Migraine syndrome  G43.909 346.00       11. Long-term use of aspirin therapy  Z79.82 V58.66       12. Medication management  Z79.899 V58.69 Hemoglobin A1C      Comprehensive Metabolic Panel      Lipid Panel      CBC Auto Differential      TSH      Vitamin B12      13. Low serum vitamin B12  E53.8 266.2 Vitamin B12      cyanocobalamin, vitamin B-12, 1,000 mcg Subl      14. Need for shingles vaccine  Z23 V04.89         ADVISED ON DIET/EXERCISE/SLEEP, ROUTINE EYE/DENTAL EXAMS, AND THE IMPORTANCE OF KEEPING UP WITH APPROPRIATE SCREENING TESTS BASED ON AGE AND RISK FACTORS.  COLONOSCOPY 8/2/23- Repeat 5 years (8/2028)  Mammogram 1/29/24- Repeat 1 year(h/o previously abnormal L mammo)  DEXA 10/2/19-- NORMAL AND REPEAT AT AGE 64  IMMUNIZATIONS REVIEWED: SHINGRIX 1/2 TODAY then 2nd dose in 6 month & SEASONAL COVID VACCINE ADVISED & OTW UTD INCLUDING FLU SHOT      PREDIABETES ASSOCIATED WITH OBESITY: Increased Metformin to 1000XR DAILY WITH MEAL FOR TREATMENT OF PREDIABETES AND HELP WITH WEIGHT LOSS.   A1C IMPROVED TO 5.7. CONTINUE CURRENT REGIMEN WITH RECHECK LABS AND A1C IN 6 MONTHS  DISCUSSED IMPORTANCE OF ONGOING ATTENTION TO HEALTHY DIET AND EXERCISE-- Previously advised HIIT STYLE WORKOUTS w/ strength training to help build muscle and improve metabolic rate.    Repeat Labs and F/U OV in 6 months    HTN: BP CONTROLLED--DIGITAL MED, continue  LISINOPRIL 20 +HCTZ 25     HEADACHES: Improved Mg Oxide 400 + Amitriptyline 50, plus melatonin prn    HLD: LDL <100 on Zocor 20-- continue Current Regimen and Repeat labs in 6 months    LOW B12: ADD SL 1000 MC LOZENGES AND Recheck in 6 months        There are no Patient Instructions on file for this visit.      Follow up in about 6 months (around 9/11/2024) for to follow up on lab results, return as needed for new concerns.

## 2024-03-11 ENCOUNTER — OFFICE VISIT (OUTPATIENT)
Dept: FAMILY MEDICINE | Facility: CLINIC | Age: 64
End: 2024-03-11
Payer: COMMERCIAL

## 2024-03-11 VITALS
SYSTOLIC BLOOD PRESSURE: 112 MMHG | WEIGHT: 174.38 LBS | OXYGEN SATURATION: 98 % | BODY MASS INDEX: 32.09 KG/M2 | DIASTOLIC BLOOD PRESSURE: 64 MMHG | HEIGHT: 62 IN | TEMPERATURE: 98 F | HEART RATE: 88 BPM

## 2024-03-11 DIAGNOSIS — R73.03 PREDIABETES: ICD-10-CM

## 2024-03-11 DIAGNOSIS — Z79.82 LONG-TERM USE OF ASPIRIN THERAPY: ICD-10-CM

## 2024-03-11 DIAGNOSIS — E55.9 VITAMIN D INSUFFICIENCY: ICD-10-CM

## 2024-03-11 DIAGNOSIS — E66.09 CLASS 1 OBESITY DUE TO EXCESS CALORIES WITH SERIOUS COMORBIDITY AND BODY MASS INDEX (BMI) OF 33.0 TO 33.9 IN ADULT: ICD-10-CM

## 2024-03-11 DIAGNOSIS — I87.8 VENOUS STASIS: ICD-10-CM

## 2024-03-11 DIAGNOSIS — J30.89 NON-SEASONAL ALLERGIC RHINITIS, UNSPECIFIED TRIGGER: ICD-10-CM

## 2024-03-11 DIAGNOSIS — I10 BENIGN ESSENTIAL HYPERTENSION: ICD-10-CM

## 2024-03-11 DIAGNOSIS — Z00.00 ROUTINE GENERAL MEDICAL EXAMINATION AT A HEALTH CARE FACILITY: Primary | ICD-10-CM

## 2024-03-11 DIAGNOSIS — E53.8 LOW SERUM VITAMIN B12: ICD-10-CM

## 2024-03-11 DIAGNOSIS — Z23 NEED FOR SHINGLES VACCINE: ICD-10-CM

## 2024-03-11 DIAGNOSIS — G43.909 MIGRAINE SYNDROME: ICD-10-CM

## 2024-03-11 DIAGNOSIS — Z79.899 MEDICATION MANAGEMENT: ICD-10-CM

## 2024-03-11 DIAGNOSIS — N18.31 CHRONIC KIDNEY DISEASE, STAGE 3A: ICD-10-CM

## 2024-03-11 DIAGNOSIS — E78.5 HYPERLIPIDEMIA, UNSPECIFIED HYPERLIPIDEMIA TYPE: ICD-10-CM

## 2024-03-11 PROCEDURE — 3074F SYST BP LT 130 MM HG: CPT | Mod: CPTII,S$GLB,, | Performed by: FAMILY MEDICINE

## 2024-03-11 PROCEDURE — 1160F RVW MEDS BY RX/DR IN RCRD: CPT | Mod: CPTII,S$GLB,, | Performed by: FAMILY MEDICINE

## 2024-03-11 PROCEDURE — 1159F MED LIST DOCD IN RCRD: CPT | Mod: CPTII,S$GLB,, | Performed by: FAMILY MEDICINE

## 2024-03-11 PROCEDURE — 4010F ACE/ARB THERAPY RXD/TAKEN: CPT | Mod: CPTII,S$GLB,, | Performed by: FAMILY MEDICINE

## 2024-03-11 PROCEDURE — 90471 IMMUNIZATION ADMIN: CPT | Mod: S$GLB,,, | Performed by: FAMILY MEDICINE

## 2024-03-11 PROCEDURE — 3078F DIAST BP <80 MM HG: CPT | Mod: CPTII,S$GLB,, | Performed by: FAMILY MEDICINE

## 2024-03-11 PROCEDURE — 99999 PR PBB SHADOW E&M-EST. PATIENT-LVL IV: CPT | Mod: PBBFAC,,, | Performed by: FAMILY MEDICINE

## 2024-03-11 PROCEDURE — 3008F BODY MASS INDEX DOCD: CPT | Mod: CPTII,S$GLB,, | Performed by: FAMILY MEDICINE

## 2024-03-11 PROCEDURE — 99396 PREV VISIT EST AGE 40-64: CPT | Mod: 25,S$GLB,, | Performed by: FAMILY MEDICINE

## 2024-03-11 PROCEDURE — 3044F HG A1C LEVEL LT 7.0%: CPT | Mod: CPTII,S$GLB,, | Performed by: FAMILY MEDICINE

## 2024-03-11 PROCEDURE — 90750 HZV VACC RECOMBINANT IM: CPT | Mod: S$GLB,,, | Performed by: FAMILY MEDICINE

## 2024-03-11 RX ORDER — MAGNESIUM 200 MG
1 TABLET ORAL
Qty: 30 TABLET | Refills: 11 | Status: SHIPPED | OUTPATIENT
Start: 2024-03-11

## 2024-04-25 DIAGNOSIS — J30.9 ALLERGIC RHINITIS WITH POSTNASAL DRIP: ICD-10-CM

## 2024-04-25 DIAGNOSIS — R09.82 ALLERGIC RHINITIS WITH POSTNASAL DRIP: ICD-10-CM

## 2024-04-25 RX ORDER — CETIRIZINE HYDROCHLORIDE 10 MG/1
10 TABLET ORAL DAILY
Qty: 90 TABLET | Refills: 3 | Status: SHIPPED | OUTPATIENT
Start: 2024-04-25

## 2024-04-25 NOTE — TELEPHONE ENCOUNTER
No care due was identified.  Health [facility] Embedded Care Due Messages. Reference number: [id number].   4/25/2024 8:34:25 AM CDT

## 2024-04-25 NOTE — TELEPHONE ENCOUNTER
Refill Decision Note   Esther Adams  is requesting a refill authorization.  Brief Assessment and Rationale for Refill:  Approve     Medication Therapy Plan:         Comments:     Note composed:6:41 PM 04/25/2024

## 2024-05-22 DIAGNOSIS — Z78.0 MENOPAUSE: ICD-10-CM

## 2024-05-22 DIAGNOSIS — E87.6 DRUG-INDUCED HYPOKALEMIA: ICD-10-CM

## 2024-05-22 DIAGNOSIS — T50.905A DRUG-INDUCED HYPOKALEMIA: ICD-10-CM

## 2024-05-23 RX ORDER — POTASSIUM CHLORIDE 20 MEQ/1
20 TABLET, EXTENDED RELEASE ORAL DAILY
Qty: 90 TABLET | Refills: 3 | Status: SHIPPED | OUTPATIENT
Start: 2024-05-23

## 2024-05-23 NOTE — TELEPHONE ENCOUNTER
No care due was identified.  [facility] Embedded Care Due Messages. Reference number: [id number].   5/22/2024 9:53:32 PM CDT

## 2024-05-23 NOTE — TELEPHONE ENCOUNTER
Refill Decision Note   Esther Adams  is requesting a refill authorization.  Brief Assessment and Rationale for Refill:  Approve     Medication Therapy Plan:         Comments:     Note composed:6:02 AM 05/23/2024

## 2024-06-26 DIAGNOSIS — I10 BENIGN ESSENTIAL HYPERTENSION: ICD-10-CM

## 2024-06-26 DIAGNOSIS — G43.909 MIGRAINE SYNDROME: ICD-10-CM

## 2024-06-26 RX ORDER — LISINOPRIL 20 MG/1
20 TABLET ORAL DAILY
Qty: 90 TABLET | Refills: 3 | Status: SHIPPED | OUTPATIENT
Start: 2024-06-26

## 2024-06-26 RX ORDER — AMITRIPTYLINE HYDROCHLORIDE 50 MG/1
TABLET, FILM COATED ORAL
Qty: 180 TABLET | Refills: 3 | Status: SHIPPED | OUTPATIENT
Start: 2024-06-26

## 2024-06-26 NOTE — TELEPHONE ENCOUNTER
Care Due:                  Date            Visit Type   Department     Provider  --------------------------------------------------------------------------------                                EP -                              PRIMARY      [facility]  Last Visit: 03-      CARE ([facility])   MEDICINE       Shanta Perez  Next Visit: None Scheduled  None         None Found                                                            Last  Test          Frequency    Reason                     Performed    Due Date  --------------------------------------------------------------------------------    HBA1C.......  6 months...  metFORMIN................  03- 08-    [facility] Embedded Care Due Messages. Reference number: [id number].   6/26/2024 7:53:59 AM CDT

## 2024-06-26 NOTE — TELEPHONE ENCOUNTER
Refill Decision Note   Esther Adams  is requesting a refill authorization.  Brief Assessment and Rationale for Refill:  Approve     Medication Therapy Plan:  FLOS      Comments:     Note composed:9:01 AM 06/26/2024

## 2024-07-27 DIAGNOSIS — R73.03 PREDIABETES: ICD-10-CM

## 2024-07-27 NOTE — TELEPHONE ENCOUNTER
No care due was identified.  [facility] Embedded Care Due Messages. Reference number: [id number].   7/27/2024 12:30:04 PM CDT

## 2024-07-29 RX ORDER — METFORMIN HYDROCHLORIDE 500 MG/1
1000 TABLET, EXTENDED RELEASE ORAL
Qty: 180 TABLET | Refills: 0 | Status: SHIPPED | OUTPATIENT
Start: 2024-07-29

## 2024-07-29 NOTE — TELEPHONE ENCOUNTER
Refill Decision Note   Esther Adams  is requesting a refill authorization.  Brief Assessment and Rationale for Refill:  Approve     Medication Therapy Plan:         Comments:     Note composed:11:02 AM 07/29/2024

## 2024-08-01 ENCOUNTER — HOSPITAL ENCOUNTER (OUTPATIENT)
Dept: RADIOLOGY | Facility: HOSPITAL | Age: 64
Discharge: HOME | End: 2024-08-01
Attending: FAMILY MEDICINE
Payer: COMMERCIAL

## 2024-08-01 DIAGNOSIS — Z78.0 MENOPAUSE: ICD-10-CM

## 2024-08-01 PROCEDURE — 77080 DXA BONE DENSITY AXIAL: CPT | Mod: 26,,, | Performed by: RADIOLOGY

## 2024-08-01 PROCEDURE — 77080 DXA BONE DENSITY AXIAL: CPT | Mod: TC

## 2024-09-06 ENCOUNTER — LAB VISIT (OUTPATIENT)
Dept: LAB | Facility: HOSPITAL | Age: 64
End: 2024-09-06
Attending: FAMILY MEDICINE
Payer: COMMERCIAL

## 2024-09-06 DIAGNOSIS — R73.03 PREDIABETES: ICD-10-CM

## 2024-09-06 DIAGNOSIS — I10 BENIGN ESSENTIAL HYPERTENSION: ICD-10-CM

## 2024-09-06 DIAGNOSIS — Z79.899 MEDICATION MANAGEMENT: ICD-10-CM

## 2024-09-06 DIAGNOSIS — N18.31 CHRONIC KIDNEY DISEASE, STAGE 3A: ICD-10-CM

## 2024-09-06 DIAGNOSIS — E53.8 LOW SERUM VITAMIN B12: ICD-10-CM

## 2024-09-06 DIAGNOSIS — E78.5 HYPERLIPIDEMIA, UNSPECIFIED HYPERLIPIDEMIA TYPE: ICD-10-CM

## 2024-09-06 LAB
ALBUMIN SERPL BCP-MCNC: 4 G/DL (ref 3.5–5.2)
ALP SERPL-CCNC: 69 U/L (ref 55–135)
ALT SERPL W/O P-5'-P-CCNC: 18 U/L (ref 10–44)
ANION GAP SERPL CALC-SCNC: 14 MMOL/L (ref 8–16)
AST SERPL-CCNC: 19 U/L (ref 10–40)
BASOPHILS # BLD AUTO: 0.07 K/UL (ref 0–0.2)
BASOPHILS NFR BLD: 0.9 % (ref 0–1.9)
BILIRUB SERPL-MCNC: 0.4 MG/DL (ref 0.1–1)
BUN SERPL-MCNC: 18 MG/DL (ref 8–23)
CALCIUM SERPL-MCNC: 9.7 MG/DL (ref 8.7–10.5)
CHLORIDE SERPL-SCNC: 103 MMOL/L (ref 95–110)
CHOLEST SERPL-MCNC: 162 MG/DL (ref 120–199)
CHOLEST/HDLC SERPL: 3.2 {RATIO} (ref 2–5)
CO2 SERPL-SCNC: 24 MMOL/L (ref 23–29)
CREAT SERPL-MCNC: 1.1 MG/DL (ref 0.5–1.4)
DIFFERENTIAL METHOD BLD: NORMAL
EOSINOPHIL # BLD AUTO: 0.2 K/UL (ref 0–0.5)
EOSINOPHIL NFR BLD: 3 % (ref 0–8)
ERYTHROCYTE [DISTWIDTH] IN BLOOD BY AUTOMATED COUNT: 13.5 % (ref 11.5–14.5)
EST. GFR  (NO RACE VARIABLE): 56 ML/MIN/1.73 M^2
ESTIMATED AVG GLUCOSE: 114 MG/DL (ref 68–131)
GLUCOSE SERPL-MCNC: 112 MG/DL (ref 70–110)
HBA1C MFR BLD: 5.6 % (ref 4–5.6)
HCT VFR BLD AUTO: 38.4 % (ref 37–48.5)
HDLC SERPL-MCNC: 51 MG/DL (ref 40–75)
HDLC SERPL: 31.5 % (ref 20–50)
HGB BLD-MCNC: 12.7 G/DL (ref 12–16)
IMM GRANULOCYTES # BLD AUTO: 0.01 K/UL (ref 0–0.04)
IMM GRANULOCYTES NFR BLD AUTO: 0.1 % (ref 0–0.5)
LDLC SERPL CALC-MCNC: 83.2 MG/DL (ref 63–159)
LYMPHOCYTES # BLD AUTO: 3 K/UL (ref 1–4.8)
LYMPHOCYTES NFR BLD: 40.6 % (ref 18–48)
MCH RBC QN AUTO: 29.8 PG (ref 27–31)
MCHC RBC AUTO-ENTMCNC: 33.1 G/DL (ref 32–36)
MCV RBC AUTO: 90 FL (ref 82–98)
MONOCYTES # BLD AUTO: 0.4 K/UL (ref 0.3–1)
MONOCYTES NFR BLD: 4.9 % (ref 4–15)
NEUTROPHILS # BLD AUTO: 3.7 K/UL (ref 1.8–7.7)
NEUTROPHILS NFR BLD: 50.5 % (ref 38–73)
NONHDLC SERPL-MCNC: 111 MG/DL
NRBC BLD-RTO: 0 /100 WBC
PLATELET # BLD AUTO: 323 K/UL (ref 150–450)
PMV BLD AUTO: 9.4 FL (ref 9.2–12.9)
POTASSIUM SERPL-SCNC: 4 MMOL/L (ref 3.5–5.1)
PROT SERPL-MCNC: 7.3 G/DL (ref 6–8.4)
RBC # BLD AUTO: 4.26 M/UL (ref 4–5.4)
SODIUM SERPL-SCNC: 141 MMOL/L (ref 136–145)
TRIGL SERPL-MCNC: 139 MG/DL (ref 30–150)
TSH SERPL DL<=0.005 MIU/L-ACNC: 1.64 UIU/ML (ref 0.4–4)
VIT B12 SERPL-MCNC: 366 PG/ML (ref 210–950)
WBC # BLD AUTO: 7.39 K/UL (ref 3.9–12.7)

## 2024-09-06 PROCEDURE — 85025 COMPLETE CBC W/AUTO DIFF WBC: CPT | Performed by: FAMILY MEDICINE

## 2024-09-06 PROCEDURE — 36415 COLL VENOUS BLD VENIPUNCTURE: CPT | Performed by: FAMILY MEDICINE

## 2024-09-06 PROCEDURE — 83036 HEMOGLOBIN GLYCOSYLATED A1C: CPT | Performed by: FAMILY MEDICINE

## 2024-09-06 PROCEDURE — 82607 VITAMIN B-12: CPT | Performed by: FAMILY MEDICINE

## 2024-09-06 PROCEDURE — 84443 ASSAY THYROID STIM HORMONE: CPT | Performed by: FAMILY MEDICINE

## 2024-09-06 PROCEDURE — 80061 LIPID PANEL: CPT | Performed by: FAMILY MEDICINE

## 2024-09-06 PROCEDURE — 80053 COMPREHEN METABOLIC PANEL: CPT | Performed by: FAMILY MEDICINE

## 2024-09-17 ENCOUNTER — OFFICE VISIT (OUTPATIENT)
Dept: FAMILY MEDICINE | Facility: CLINIC | Age: 64
End: 2024-09-17
Payer: COMMERCIAL

## 2024-09-17 DIAGNOSIS — E78.5 HYPERLIPIDEMIA, UNSPECIFIED HYPERLIPIDEMIA TYPE: ICD-10-CM

## 2024-09-17 DIAGNOSIS — G43.909 MIGRAINE SYNDROME: ICD-10-CM

## 2024-09-17 DIAGNOSIS — Z79.899 MEDICATION MANAGEMENT: ICD-10-CM

## 2024-09-17 DIAGNOSIS — E53.8 LOW SERUM VITAMIN B12: ICD-10-CM

## 2024-09-17 DIAGNOSIS — E66.09 CLASS 1 OBESITY DUE TO EXCESS CALORIES WITH SERIOUS COMORBIDITY AND BODY MASS INDEX (BMI) OF 33.0 TO 33.9 IN ADULT: ICD-10-CM

## 2024-09-17 DIAGNOSIS — I10 BENIGN ESSENTIAL HYPERTENSION: ICD-10-CM

## 2024-09-17 DIAGNOSIS — R73.03 PREDIABETES: Primary | ICD-10-CM

## 2024-09-17 DIAGNOSIS — E55.9 VITAMIN D INSUFFICIENCY: ICD-10-CM

## 2024-09-17 DIAGNOSIS — N18.31 CHRONIC KIDNEY DISEASE, STAGE 3A: ICD-10-CM

## 2024-09-17 PROCEDURE — 3044F HG A1C LEVEL LT 7.0%: CPT | Mod: CPTII,95,, | Performed by: FAMILY MEDICINE

## 2024-09-17 PROCEDURE — 1160F RVW MEDS BY RX/DR IN RCRD: CPT | Mod: CPTII,95,, | Performed by: FAMILY MEDICINE

## 2024-09-17 PROCEDURE — 99214 OFFICE O/P EST MOD 30 MIN: CPT | Mod: 95,,, | Performed by: FAMILY MEDICINE

## 2024-09-17 PROCEDURE — 4010F ACE/ARB THERAPY RXD/TAKEN: CPT | Mod: CPTII,95,, | Performed by: FAMILY MEDICINE

## 2024-09-17 PROCEDURE — 1159F MED LIST DOCD IN RCRD: CPT | Mod: CPTII,95,, | Performed by: FAMILY MEDICINE

## 2024-09-17 NOTE — PROGRESS NOTES
Office Visit    Patient Name: Esther Adams    : 1960  MRN: [id number]    Subjective:  Esther is a 64 y.o. female who presents today for:    No chief complaint on file.    The patient location is: IN HER CAR  The chief complaint leading to consultation is: FOLLOW UP     Visit type: audiovisual    Face to Face time with patient: START 1140 END 1158  30 minutes of total time spent on the encounter, which includes face to face time and non-face to face time preparing to see the patient (eg, review of tests), Obtaining and/or reviewing separately obtained history, Documenting clinical information in the electronic or other health record, Independently interpreting results (not separately reported) and communicating results to the patient/family/caregiver, or Care coordination (not separately reported).     Each patient to whom he or she provides medical services by telemedicine is:  (1) informed of the relationship between the physician and patient and the respective role of any other health care provider with respect to management of the patient; and (2) notified that he or she may decline to receive medical services by telemedicine and may withdraw from such care at any time.    Notes:     Most Recent OV w/ me Physical 3/11/24  ENT 23 Asymmetric Hearing Loss, MRI Imaging Unremarkable  She had a fractured tooth root addressed by an oral surgeon-- she has lots of dental issues and an infection over the last year-- dental issues are ongoing unfortunately-- needs to go back for an implant following recent bone graft.      Esther Adams presents today for lab review and 6 month follow up of chronic conditions.      Most recent labs 24 with improved A1c to 5.6, EGFR 56 and LDL 83, B12 366, TSH 1.6.     TAKING METFORMIN 1000XR DAILY-- feeling a bit lea faster.   GI issues have leveled out-- normal BMs, no n/v.   No BP concerns.  Taking B12 lozenges off and on with some improvement in B12-- no notable  difference in energy but not overly consistent with this.      Chronic health conditions including obesity, hypertension, hyperlipidemia, history of colitis s/p GI evaluation and colonoscopy 2/2018 +8/2/23, chronic migraines.     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   This is a challenging position with significant stress-- she has multiple responsibilities, has to fill multiple roles, and often fatigued by the end of the day    No acute complaints. Sleep and migraines have been a bit better recently and she is eating a little healthier.      General lifestyle habits are as follows:    Diet: FAIR, some improvement with meal planning, bringing lunch to work, hydrates with water. Healthier dinner at night-- her  is on board  Exercise: fair: doing some walking but needs to do more and has been swimming some over the summer. New dog- chihuahua that she is walking.   Sleep: FAIR-- Elavil  helps with sleep and migraines, enjoys sleeping with her new dog-- it's a comfort though hard on the allergies. Only waking 1-2 times nightly recently Melatonin will help PRN  Weight:  Previously DOWN ABOUT 5 LB WITH IMPROVEMENT IN BMI 31.9, total weight loss of about 10 lb from her recent peak weight and she is maintaining her prior baseline weight     Immunizations: TDaP 9/7/2016, FLU SHOT 10/13/23,  SHINGRIX 1/2-- 2/2 advised, COVID 19 completed 1/6/21 w/ Pfizer BOOSTER 1/27/22 & SEASONAL UPDATED COVID VACCINE ADVISED, PREVNAR 20 @ 65--ADVISED     Screening Tests: COLONOSCOPY 8/2/23- Repeat 5 years (8/2028), mammogram 1/29/24- Repeat 1 year(h/o previously abnormal L mammo) & ORDERED, DEXA 8/1/24-- mild osteopenia of r hip & repeat 3 years, no longer needs paps-- EUGENIE/BSO secondary to endometriosis, Hep C Ab + 9/11/17 but s/p 2 negative viral loads (most recently 9/21/2018)      EYE EXAM: 11/2/23 Dr Hernandez-- Follow up 1 year Dr Hernandez,  Dental-- Following up with Oral surgeon     PAST MEDICAL HISTORY, SURGICAL/SOCIAL/FAMILY HISTORY REVIEWED AS PER CHART, WITH PERTINENT FINDINGS INCLUDED IN HISTORY SECTION OF NOTE.     Current Medications    Medication List with Changes/Refills   Current Medications    AMITRIPTYLINE (ELAVIL) 50 MG TABLET    Take 1-2 tablets by mouth nightly for sleep and migraine prevention    AMOXICILLIN (AMOXIL) 500 MG CAPSULE    Take 1 capsule (500 mg total) by mouth every 6 (six) hours until gone. Start day before surgery.    AMOXICILLIN (AMOXIL) 500 MG CAPSULE    Take 1 capsule by mouth every 6 hous until gone. Start day before surgery    ASPIRIN (ECOTRIN) 81 MG EC TABLET    Take 1 tablet (81 mg total) by mouth once daily. 1 Tablet, Delayed Release (E.C.) Oral Every day    BUTALBITAL-ACETAMINOPHEN-CAFFEINE -40 MG (FIORICET, ESGIC) -40 MG PER TABLET    Take 1 tablet by mouth every 4 (four) hours as needed for Pain or Headaches.    CETIRIZINE (ZYRTEC) 10 MG TABLET    Take 1 tablet (10 mg total) by mouth once daily.    CHOLECALCIFEROL, VITAMIN D3, 125 MCG (5,000 UNIT) CAPSULE    Take 1 capsule (5,000 Units total) by mouth daily with breakfast.    CYANOCOBALAMIN, VITAMIN B-12, 1,000 MCG SUBL    Place 1 lozenge under the tongue 3 (three) times a week.    ESTRADIOL (ESTRACE) 0.01 % (0.1 MG/GRAM) VAGINAL CREAM    Place 1 g vaginally twice a week. Use cream nightly for very 1st 2 weeks    FLUTICASONE PROPIONATE (FLONASE) 50 MCG/ACTUATION NASAL SPRAY    2 sprays (100 mcg total) by Each Nostril route once daily.    HYDROCHLOROTHIAZIDE (HYDRODIURIL) 25 MG TABLET    Take 1 tablet (25 mg total) by mouth once daily.    LISINOPRIL (PRINIVIL,ZESTRIL) 20 MG TABLET    Take 1 tablet (20 mg total) by mouth once daily.    MAGNESIUM OXIDE (MAG-OX) 400 MG (241.3 MG MAGNESIUM) TABLET    Take 1 tablet (400 mg total) by mouth once daily.    METFORMIN (GLUCOPHAGE-XR) 500 MG ER 24HR TABLET    Take 2 tablets (1,000 mg total) by mouth daily with  dinner or evening meal.    METHYLPREDNISOLONE (MEDROL DOSEPACK) 4 MG TABLET    take as directed    OXYCODONE-ACETAMINOPHEN (PERCOCET) 7.5-325 MG PER TABLET    Take 1 tablet by mouth every 6 (six) hours as needed for pain.    OXYCODONE-ACETAMINOPHEN (PERCOCET) 7.5-325 MG PER TABLET    Take 1 tablet by mouth every 6 hours as needed for pain    POTASSIUM CHLORIDE SA (KLOR-CON M20) 20 MEQ TABLET    Take 1 tablet (20 mEq total) by mouth once daily.    SIMVASTATIN (ZOCOR) 20 MG TABLET    Take 1 tablet (20 mg total) by mouth once daily.       Allergies   Review of patient's allergies indicates:   Allergen Reactions    Morphine      Other reaction(s): Itching         Review of Systems (Pertinent positives)  Review of Systems   Constitutional:  Negative for activity change and unexpected weight change.   HENT:  Negative for hearing loss, rhinorrhea and trouble swallowing.    Eyes:  Negative for discharge and visual disturbance.   Respiratory:  Negative for chest tightness and wheezing.    Cardiovascular:  Negative for chest pain and palpitations.   Gastrointestinal:  Negative for blood in stool, constipation, diarrhea and vomiting.   Endocrine: Negative for polydipsia and polyuria.   Genitourinary:  Negative for difficulty urinating, dysuria, hematuria and menstrual problem.   Musculoskeletal:  Negative for arthralgias, joint swelling and neck pain.   Neurological:  Positive for headaches (stable with NSIAD and prn fioricet). Negative for weakness.   Psychiatric/Behavioral:  Negative for confusion and dysphoric mood.        [facility] 01/01/2002 (Approximate) Comment: 15-18 yrs ago    Physical Exam  Vitals reviewed.   Constitutional:       General: She is not in acute distress.     Appearance: Normal appearance. She is well-developed.   HENT:      Head: Normocephalic and atraumatic.   Eyes:      Conjunctiva/sclera: Conjunctivae normal.   Pulmonary:      Effort: Pulmonary effort is normal.   Neurological:      Mental Status: She is  alert and oriented to person, place, and time.   Psychiatric:         Mood and Affect: Mood normal.         Behavior: Behavior normal.           Assessment/Plan:  Esther Adams is a 64 y.o. female who presents today for :        ICD-10-CM ICD-9-CM    1. Prediabetes  R73.03 790.29       2. Benign essential hypertension  I10 401.1       3. Hyperlipidemia, unspecified hyperlipidemia type  E78.5 272.4       4. Vitamin D insufficiency  E55.9 268.9       5. Migraine syndrome  G43.909 346.00       6. Chronic kidney disease, stage 3a  N18.31 585.3       7. Low serum vitamin B12  E53.8 266.2       8. Class 1 obesity due to excess calories with serious comorbidity and body mass index (BMI) of 33.0 to 33.9 in adult  E66.09 278.00     Z68.33 V85.33       9. Medication management  Z79.899 V58.69         DEXA  UPDATED 8/1/24-- mild osteopenia of r hip & repeat 3 years  IMMUNIZATIONS REVIEWED: SHINGRIX 1/2 GIVEN and 2/2 ADVISED. Also advised Prevnar 20 and  SEASONAL COVID & FLU VACCINE  Colonoscopy due 2028      PREDIABETES ASSOCIATED WITH OBESITY: Continue Metformin 1000XR DAILY WITH MEAL FOR TREATMENT OF PREDIABETES AND HELP WITH WEIGHT LOSS.   A1C IMPROVED TO 5.6. CONTINUE CURRENT REGIMEN WITH RECHECK LABS AND A1C IN 6 MONTHS  DISCUSSED IMPORTANCE OF ONGOING ATTENTION TO HEALTHY DIET AND EXERCISE-- Previously advised HIIT STYLE WORKOUTS w/ strength training to help build muscle and improve metabolic rate.      STAGE 3A CKD: EGFR stable at 56. Continue focus on hydration and control of BP/sugar, recheck in 6 months     HTN: BP CONTROLLED--DIGITAL MED, continue LISINOPRIL 20 +HCTZ 25     HEADACHES: Improved Mg Oxide 400 + Amitriptyline 50, plus melatonin prn for sleep/HA prevention, responds to NSAIDs/Fioricet prn     HLD: LDL <100 on Zocor 20-- continue Current Regimen and Repeat labs in 6 months     LOW B12: Some improvement on SL 1000 MC LOZENGES-- increase to daily and Recheck in 6 months       There are no Patient  Instructions on file for this visit.      Follow up in about 6 months (around 3/17/2025) for to follow up on lab results, return as needed for new concerns.

## 2024-09-18 ENCOUNTER — TELEPHONE (OUTPATIENT)
Dept: FAMILY MEDICINE | Facility: CLINIC | Age: 64
End: 2024-09-18
Payer: COMMERCIAL

## 2024-09-18 NOTE — TELEPHONE ENCOUNTER
----- Message from Shanta Perez MD sent at 9/17/2024 12:06 PM CDT -----  Regarding: VV follow up  Please schedule for fasting labs in 6 months with in person annual physical to follow thanks

## 2024-10-25 DIAGNOSIS — R73.03 PREDIABETES: ICD-10-CM

## 2024-10-25 DIAGNOSIS — I10 BENIGN ESSENTIAL HYPERTENSION: ICD-10-CM

## 2024-10-25 NOTE — TELEPHONE ENCOUNTER
No care due was identified.  Health [facility] Embedded Care Due Messages. Reference number: [id number].   10/25/2024 9:12:41 AM CDT

## 2024-10-26 RX ORDER — METFORMIN HYDROCHLORIDE 500 MG/1
1000 TABLET, EXTENDED RELEASE ORAL
Qty: 180 TABLET | Refills: 1 | Status: SHIPPED | OUTPATIENT
Start: 2024-10-26

## 2024-10-26 RX ORDER — HYDROCHLOROTHIAZIDE 25 MG/1
25 TABLET ORAL DAILY
Qty: 90 TABLET | Refills: 1 | Status: SHIPPED | OUTPATIENT
Start: 2024-10-26

## 2024-10-26 NOTE — TELEPHONE ENCOUNTER
Refill Authorization Note     Refill Decision Note   Esther Adams  is requesting a refill authorization.  Brief Assessment and Rationale for Refill:  Approve     Medication Therapy Plan:       Medication Reconciliation Completed: No   Comments:     No Care Gaps recommended.     Note composed:11:50 AM 10/26/2024

## 2024-12-15 DIAGNOSIS — G43.909 MIGRAINE SYNDROME: ICD-10-CM

## 2024-12-15 DIAGNOSIS — E55.9 VITAMIN D INSUFFICIENCY: ICD-10-CM

## 2024-12-15 DIAGNOSIS — K59.00 CONSTIPATION, UNSPECIFIED CONSTIPATION TYPE: ICD-10-CM

## 2024-12-15 NOTE — TELEPHONE ENCOUNTER
Care Due:                  Date            Visit Type   Department     Provider  --------------------------------------------------------------------------------                                ESTABLISHED                              PATIENT -    [facility] FAMILY  Last Visit: 09-      VIRTUAL      MEDICINE       Shanta Perez                               -                              [facility] INTERNAL  Next Visit: 03-      CARE ([facility])   MEDICINE       Shanta Perez                                                            Last  Test          Frequency    Reason                     Performed    Due Date  --------------------------------------------------------------------------------    HBA1C.......  6 months...  metFORMIN................  09- 03-    Mg Level....  12 months..  magnesium................  03- 02-    Vitamin D...  12 months..  cholecalciferol,.........  03- 02-    [facility] Embedded Care Due Messages. Reference number: [id number].   12/15/2024 12:49:54 PM CST

## 2024-12-15 NOTE — TELEPHONE ENCOUNTER
No care due was identified.  [facility] Embedded Care Due Messages. Reference number: [id number].   12/15/2024 12:50:26 PM CST

## 2024-12-16 RX ORDER — BUTALBITAL, ACETAMINOPHEN AND CAFFEINE 50; 325; 40 MG/1; MG/1; MG/1
1 TABLET ORAL EVERY 4 HOURS PRN
Qty: 60 TABLET | Refills: 3 | Status: SHIPPED | OUTPATIENT
Start: 2024-12-16

## 2024-12-16 RX ORDER — LANOLIN ALCOHOL/MO/W.PET/CERES
400 CREAM (GRAM) TOPICAL DAILY
Qty: 120 TABLET | Refills: 4 | Status: SHIPPED | OUTPATIENT
Start: 2024-12-16

## 2024-12-16 RX ORDER — VIT C/E/ZN/COPPR/LUTEIN/ZEAXAN 250MG-90MG
5000 CAPSULE ORAL
Qty: 100 CAPSULE | Refills: 4 | Status: SHIPPED | OUTPATIENT
Start: 2024-12-16

## 2024-12-16 NOTE — TELEPHONE ENCOUNTER
Refill Routing Note   Medication(s) are not appropriate for processing by [facility] for the following reason(s):        Outside of protocol    [facility] action(s):  Route               Appointments  past 12m or future 3m with PCP    Date Provider   Last Visit   9/17/2024 Shanta Perez MD   Next Visit   3/19/2025 Shanta Perez MD   ED visits in past 90 days: 0        Note composed:6:53 AM 12/16/2024

## 2024-12-30 ENCOUNTER — OFFICE VISIT (OUTPATIENT)
Dept: FAMILY MEDICINE | Facility: CLINIC | Age: 64
End: 2024-12-30
Payer: COMMERCIAL

## 2024-12-30 DIAGNOSIS — R73.03 PREDIABETES: ICD-10-CM

## 2024-12-30 DIAGNOSIS — B96.89 ACUTE BACTERIAL SINUSITIS: ICD-10-CM

## 2024-12-30 DIAGNOSIS — N18.31 CHRONIC KIDNEY DISEASE, STAGE 3A: ICD-10-CM

## 2024-12-30 DIAGNOSIS — J01.90 ACUTE BACTERIAL SINUSITIS: ICD-10-CM

## 2024-12-30 DIAGNOSIS — B34.9 ACUTE VIRAL SYNDROME: Primary | ICD-10-CM

## 2024-12-30 DIAGNOSIS — J30.89 NON-SEASONAL ALLERGIC RHINITIS, UNSPECIFIED TRIGGER: ICD-10-CM

## 2024-12-30 DIAGNOSIS — I10 BENIGN ESSENTIAL HYPERTENSION: ICD-10-CM

## 2024-12-30 PROCEDURE — 1160F RVW MEDS BY RX/DR IN RCRD: CPT | Mod: CPTII,95,, | Performed by: FAMILY MEDICINE

## 2024-12-30 PROCEDURE — 1159F MED LIST DOCD IN RCRD: CPT | Mod: CPTII,95,, | Performed by: FAMILY MEDICINE

## 2024-12-30 PROCEDURE — 99213 OFFICE O/P EST LOW 20 MIN: CPT | Mod: 95,,, | Performed by: FAMILY MEDICINE

## 2024-12-30 PROCEDURE — 4010F ACE/ARB THERAPY RXD/TAKEN: CPT | Mod: CPTII,95,, | Performed by: FAMILY MEDICINE

## 2024-12-30 PROCEDURE — 3044F HG A1C LEVEL LT 7.0%: CPT | Mod: CPTII,95,, | Performed by: FAMILY MEDICINE

## 2024-12-30 RX ORDER — METHYLPREDNISOLONE 4 MG/1
TABLET ORAL
Qty: 21 EACH | Refills: 0 | Status: SHIPPED | OUTPATIENT
Start: 2024-12-30 | End: 2025-01-20

## 2024-12-30 RX ORDER — DOXYCYCLINE 100 MG/1
100 CAPSULE ORAL EVERY 12 HOURS
Qty: 14 CAPSULE | Refills: 0 | Status: SHIPPED | OUTPATIENT
Start: 2024-12-30 | End: 2025-01-06

## 2024-12-30 NOTE — PROGRESS NOTES
Office Visit    Patient Name: Esther Adams    : 1960  MRN: [id number]    Subjective:  Esther is a 64 y.o. female who presents today for:    No chief complaint on file.    The patient location is: HER HOME  The chief complaint leading to consultation is: URI/ SINUS INFECTION CONCERN    Visit type: audiovisual    Face to Face time with patient: START 104   15  minutes of total time spent on the encounter, which includes face to face time and non-face to face time preparing to see the patient (eg, review of tests), Obtaining and/or reviewing separately obtained history, Documenting clinical information in the electronic or other health record, Independently interpreting results (not separately reported) and communicating results to the patient/family/caregiver, or Care coordination (not separately reported).     Each patient to whom he or she provides medical services by telemedicine is:  (1) informed of the relationship between the physician and patient and the respective role of any other health care provider with respect to management of the patient; and (2) notified that he or she may decline to receive medical services by telemedicine and may withdraw from such care at any time.    Notes:     Most recently seen by me for routine follow-up 2024  Labs 24 with improved A1c to 5.6, EGFR 56 and LDL 83, B12 366, TSH 1.6.      Chronic health conditions including obesity, hypertension, hyperlipidemia, seasonal allergies treated with antihistamine and occasional Flonase (too much gives her nosebleeds), history of colitis s/p GI evaluation and colonoscopy 2018 +23, chronic migraines and prediabetes treated with metformin.    Today she c/o symptoms for 5 days    Starting pastor day she started having sore throat, congestion, ear pressure and cough productive of thick mucus.  She has left sided pain and pressure.   She is undergoing oral surgery on the right side but there are no current  oral infection concerns.    Has tried Airborne and mucinex with only mild short term relief.     No fevers or body aches, does feel fatigued from the sinus symptoms but overall not systemically ill.    No close contacts with any viral illnesses such as flu or COVID.       PAST MEDICAL HISTORY, SURGICAL/SOCIAL/FAMILY HISTORY REVIEWED AS PER CHART, WITH PERTINENT FINDINGS INCLUDED IN HISTORY SECTION OF NOTE.     Current Medications    Medication List with Changes/Refills   New Medications    DOXYCYCLINE (VIBRAMYCIN) 100 MG CAP    Take 1 capsule (100 mg total) by mouth every 12 (twelve) hours. for 7 days    METHYLPREDNISOLONE (MEDROL DOSEPACK) 4 MG TABLET    use as directed   Current Medications    AMITRIPTYLINE (ELAVIL) 50 MG TABLET    Take 1-2 tablets by mouth nightly for sleep and migraine prevention    AMOXICILLIN (AMOXIL) 500 MG CAPSULE    Take 1 capsule (500 mg total) by mouth every 6 (six) hours until gone. Start day before surgery.    ASPIRIN (ECOTRIN) 81 MG EC TABLET    Take 1 tablet (81 mg total) by mouth once daily. 1 Tablet, Delayed Release (E.C.) Oral Every day    BUTALBITAL-ACETAMINOPHEN-CAFFEINE -40 MG (FIORICET, ESGIC) -40 MG PER TABLET    Take 1 tablet by mouth every 4 (four) hours as needed for Pain or Headaches.    CETIRIZINE (ZYRTEC) 10 MG TABLET    Take 1 tablet (10 mg total) by mouth once daily.    CHOLECALCIFEROL, VITAMIN D3, 125 MCG (5,000 UNIT) CAPSULE    Take 1 capsule (5,000 Units total) by mouth daily with breakfast.    CYANOCOBALAMIN, VITAMIN B-12, 1,000 MCG SUBL    Place 1 lozenge under the tongue 3 (three) times a week.    ESTRADIOL (ESTRACE) 0.01 % (0.1 MG/GRAM) VAGINAL CREAM    Place 1 g vaginally twice a week. Use cream nightly for very 1st 2 weeks    FLUTICASONE PROPIONATE (FLONASE) 50 MCG/ACTUATION NASAL SPRAY    2 sprays (100 mcg total) by Each Nostril route once daily.    HYDROCHLOROTHIAZIDE (HYDRODIURIL) 25 MG TABLET    Take 1 tablet (25 mg total) by mouth once daily.     LISINOPRIL (PRINIVIL,ZESTRIL) 20 MG TABLET    Take 1 tablet (20 mg total) by mouth once daily.    MAGNESIUM OXIDE (MAG-OX) 400 MG (241.3 MG MAGNESIUM) TABLET    Take 1 tablet (400 mg total) by mouth once daily.    METFORMIN (GLUCOPHAGE-XR) 500 MG ER 24HR TABLET    Take 2 tablets (1,000 mg total) by mouth daily with dinner or evening meal.    POTASSIUM CHLORIDE SA (KLOR-CON M20) 20 MEQ TABLET    Take 1 tablet (20 mEq total) by mouth once daily.    SIMVASTATIN (ZOCOR) 20 MG TABLET    Take 1 tablet (20 mg total) by mouth once daily.   Discontinued Medications    AMOXICILLIN (AMOXIL) 500 MG CAPSULE    Take 1 capsule by mouth every 6 hous until gone. Start day before surgery    METHYLPREDNISOLONE (MEDROL DOSEPACK) 4 MG TABLET    take as directed    OXYCODONE-ACETAMINOPHEN (PERCOCET) 7.5-325 MG PER TABLET    Take 1 tablet by mouth every 6 (six) hours as needed for pain.    OXYCODONE-ACETAMINOPHEN (PERCOCET) 7.5-325 MG PER TABLET    Take 1 tablet by mouth every 6 hours as needed for pain       Allergies   Review of patient's allergies indicates:   Allergen Reactions    Morphine      Other reaction(s): Itching         Review of Systems (Pertinent positives)  Review of Systems   HENT:  Positive for congestion, sinus pressure, sinus pain and sore throat. Negative for drooling, ear discharge, ear pain and trouble swallowing.    Respiratory:  Positive for cough. Negative for shortness of breath and stridor.    Gastrointestinal:  Negative for abdominal pain, diarrhea and vomiting.   Musculoskeletal:  Negative for neck pain.   Neurological:  Positive for headaches.       LMP 01/01/2002 (Approximate) Comment: 15-18 yrs ago    Physical Exam  Vitals reviewed.   Constitutional:       General: She is not in acute distress.     Appearance: Normal appearance. She is well-developed.   HENT:      Head: Normocephalic and atraumatic.      Nose:      Left Sinus: Maxillary sinus tenderness present.   Eyes:      Conjunctiva/sclera:  Conjunctivae normal.   Pulmonary:      Effort: Pulmonary effort is normal.   Neurological:      Mental Status: She is alert and oriented to person, place, and time.   Psychiatric:         Mood and Affect: Mood normal.         Behavior: Behavior normal.           Assessment/Plan:  Esther Adams is a 64 y.o. female who presents today for :        ICD-10-CM ICD-9-CM    1. Acute viral syndrome  B34.9 079.99       2. Acute bacterial sinusitis  J01.90 461.9 methylPREDNISolone (MEDROL DOSEPACK) 4 mg tablet    B96.89  doxycycline (VIBRAMYCIN) 100 MG Cap      3. Chronic kidney disease, stage 3a  N18.31 585.3       4. Benign essential hypertension  I10 401.1       5. Prediabetes  R73.03 790.29       6. Non-seasonal allergic rhinitis, unspecified trigger  J30.89 477.8         64-year-old female patient of mine with initial onset of viral URI symptoms now localizing to left maxillary and frontal regions of the face-has thick mucus and sinus pain, swelling worsening after a few days of general URI symptoms.    Suspect resolving viral URI now with superimposed bacterial sinusitis of left maxillary region.    Treat with Medrol Dosepak given severity of pain/pressure/swelling, course of doxycycline antibiotic.  Advised to continue Mucinex or Mucinex DM twice daily with plenty of water.  She declines codeine guaifenesin cough syrup.      Also advised on utilization of premixed isotonic saline nasal rinses.      Continue antihistamine, p.r.n. Flonase that she takes for allergies.      Prediabetes, hypertension, mild CKD, have been well controlled and she has upcoming appointments for labs and her annual physical already scheduled.    There are no Patient Instructions on file for this visit.      Follow up for Follow-up if no improvement or symptoms worsen.

## 2025-01-29 ENCOUNTER — HOSPITAL ENCOUNTER (OUTPATIENT)
Dept: RADIOLOGY | Facility: HOSPITAL | Age: 65
Discharge: HOME | End: 2025-01-29
Attending: FAMILY MEDICINE
Payer: COMMERCIAL

## 2025-01-29 DIAGNOSIS — Z12.31 ENCOUNTER FOR SCREENING MAMMOGRAM FOR BREAST CANCER: ICD-10-CM

## 2025-01-29 PROCEDURE — 77067 SCR MAMMO BI INCL CAD: CPT | Mod: 26,,, | Performed by: RADIOLOGY

## 2025-01-29 PROCEDURE — 77063 BREAST TOMOSYNTHESIS BI: CPT | Mod: TC

## 2025-01-29 PROCEDURE — 77063 BREAST TOMOSYNTHESIS BI: CPT | Mod: 26,,, | Performed by: RADIOLOGY

## 2025-03-03 ENCOUNTER — OFFICE VISIT (OUTPATIENT)
Dept: FAMILY MEDICINE | Facility: CLINIC | Age: 65
End: 2025-03-03
Attending: FAMILY MEDICINE
Payer: COMMERCIAL

## 2025-03-03 ENCOUNTER — HOSPITAL ENCOUNTER (OUTPATIENT)
Dept: RADIOLOGY | Facility: HOSPITAL | Age: 65
Discharge: HOME | End: 2025-03-03
Attending: FAMILY MEDICINE
Payer: COMMERCIAL

## 2025-03-03 VITALS
DIASTOLIC BLOOD PRESSURE: 66 MMHG | WEIGHT: 172.81 LBS | BODY MASS INDEX: 31.8 KG/M2 | SYSTOLIC BLOOD PRESSURE: 114 MMHG | OXYGEN SATURATION: 96 % | TEMPERATURE: 98 F | HEIGHT: 62 IN | HEART RATE: 98 BPM

## 2025-03-03 DIAGNOSIS — L03.031 CELLULITIS OF RIGHT TOE: ICD-10-CM

## 2025-03-03 DIAGNOSIS — S99.921A INJURY OF TOE ON RIGHT FOOT, INITIAL ENCOUNTER: Primary | ICD-10-CM

## 2025-03-03 DIAGNOSIS — S99.921A INJURY OF TOE ON RIGHT FOOT, INITIAL ENCOUNTER: ICD-10-CM

## 2025-03-03 PROCEDURE — 99214 OFFICE O/P EST MOD 30 MIN: CPT | Mod: S$GLB,,, | Performed by: FAMILY MEDICINE

## 2025-03-03 PROCEDURE — 1125F AMNT PAIN NOTED PAIN PRSNT: CPT | Mod: CPTII,S$GLB,, | Performed by: FAMILY MEDICINE

## 2025-03-03 PROCEDURE — 1160F RVW MEDS BY RX/DR IN RCRD: CPT | Mod: CPTII,S$GLB,, | Performed by: FAMILY MEDICINE

## 2025-03-03 PROCEDURE — 99999 PR PBB SHADOW E&M-EST. PATIENT-LVL V: CPT | Mod: PBBFAC,,, | Performed by: FAMILY MEDICINE

## 2025-03-03 PROCEDURE — 3078F DIAST BP <80 MM HG: CPT | Mod: CPTII,S$GLB,, | Performed by: FAMILY MEDICINE

## 2025-03-03 PROCEDURE — 3008F BODY MASS INDEX DOCD: CPT | Mod: CPTII,S$GLB,, | Performed by: FAMILY MEDICINE

## 2025-03-03 PROCEDURE — 1101F PT FALLS ASSESS-DOCD LE1/YR: CPT | Mod: CPTII,S$GLB,, | Performed by: FAMILY MEDICINE

## 2025-03-03 PROCEDURE — 4010F ACE/ARB THERAPY RXD/TAKEN: CPT | Mod: CPTII,S$GLB,, | Performed by: FAMILY MEDICINE

## 2025-03-03 PROCEDURE — 3288F FALL RISK ASSESSMENT DOCD: CPT | Mod: CPTII,S$GLB,, | Performed by: FAMILY MEDICINE

## 2025-03-03 PROCEDURE — 73630 X-RAY EXAM OF FOOT: CPT | Mod: TC,FY,RT

## 2025-03-03 PROCEDURE — 3074F SYST BP LT 130 MM HG: CPT | Mod: CPTII,S$GLB,, | Performed by: FAMILY MEDICINE

## 2025-03-03 PROCEDURE — 1159F MED LIST DOCD IN RCRD: CPT | Mod: CPTII,S$GLB,, | Performed by: FAMILY MEDICINE

## 2025-03-03 PROCEDURE — 73630 X-RAY EXAM OF FOOT: CPT | Mod: 26,RT,, | Performed by: RADIOLOGY

## 2025-03-03 RX ORDER — DOXYCYCLINE 100 MG/1
100 CAPSULE ORAL EVERY 12 HOURS
Qty: 20 CAPSULE | Refills: 0 | Status: SHIPPED | OUTPATIENT
Start: 2025-03-03

## 2025-03-03 NOTE — PROGRESS NOTES
Subjective     Patient ID: Esther Adams is a 64 y.o. female.    Chief Complaint: Toe Pain (Swollen, red)    64 yr old pleasant female with prediabetes, HTN, HLD, CKD, presents today for urgent care evaluation of right 3rd toe swelling and pain.it started a week ago when she bumped and skid on something at home. She felt swelling and pain which improved in few days and then started back again. Painful to put weight and walk. Warm to touch. Details as follows -    Toe Pain   The incident occurred at home. The injury mechanism was a direct blow. The pain is present in the right toes. The quality of the pain is described as aching. The pain is at a severity of 6/10. The pain is moderate. The pain has been Constant since onset. The symptoms are aggravated by movement, palpation and weight bearing. She has tried NSAIDs for the symptoms. The treatment provided mild relief.     Review of Systems   Constitutional: Negative.  Negative for activity change, diaphoresis and unexpected weight change.   HENT: Negative.  Negative for nasal congestion, ear discharge, hearing loss, rhinorrhea, sore throat and voice change.    Eyes: Negative.  Negative for pain, discharge and visual disturbance.   Respiratory: Negative.  Negative for chest tightness, shortness of breath and wheezing.    Cardiovascular: Negative.  Negative for chest pain.   Gastrointestinal: Negative.  Negative for abdominal distention, anal bleeding, constipation and nausea.   Endocrine: Negative.  Negative for cold intolerance, polydipsia and polyuria.   Genitourinary: Negative.  Negative for decreased urine volume, difficulty urinating, dysuria, frequency, menstrual problem and vaginal pain.   Musculoskeletal:  Positive for arthralgias, leg pain and myalgias. Negative for gait problem.   Integumentary:  Positive for color change. Negative for pallor and wound.   Allergic/Immunologic: Negative.  Negative for environmental allergies and immunocompromised state.  "  Neurological: Negative.  Negative for dizziness, tremors, seizures, speech difficulty and headaches.   Hematological: Negative.  Negative for adenopathy. Does not bruise/bleed easily.   Psychiatric/Behavioral: Negative.  Negative for agitation, confusion, decreased concentration, hallucinations, self-injury and suicidal ideas. The patient is not nervous/anxious.      Past Medical History:   Diagnosis Date    Allergic rhinitis 2016    trial of daily allegra or any antihistamine- avoid "D" products due to high blood pressure    Benign essential hypertension 2012    Family history of stroke 2016    Hyperlipidemia 2016    Migraine syndrome     Obesity (BMI 30.0-34.9) 2016    Positive hepatitis C antibody test 2017    Vitamin D insufficiency 2017       Past Surgical History:   Procedure Laterality Date    BILATERAL OOPHORECTOMY      BREAST BIOPSY Left      SECTION      COLONOSCOPY N/A 2018    Procedure: COLONOSCOPY Golytely;  Surgeon: Jo-Ann Simon MD;  Location: [facility] ENDO;  Service: Endoscopy;  Laterality: N/A;    COLONOSCOPY N/A 2023    Procedure: COLONOSCOPY;  Surgeon: Jo-Ann Simon MD;  Location: [facility] ENDO;  Service: Endoscopy;  Laterality: N/A;    HYSTERECTOMY      endometriosis    OOPHORECTOMY         Family History   Problem Relation Name Age of Onset    Breast cancer Mother Liyah Thorpe 60    Heart disease Mother Liyah Thorpe     Stroke Mother Liyah Thorpe     Hypertension Mother Liyah Thorpe     Glaucoma Mother Liyah Thorpe     Thyroid disease Mother Liyah Thorpe     Osteoporosis Mother Liyah Thorpe     Cancer Mother Liyah Thorpe     Stroke Father Laith Maco     Schizophrenia Father Laith Maco     Pancreatitis Father Laith Maco         viral    Migraines Father Laith Maco     Migraines Sister      Breast cancer Sister  60    Ovarian cancer Maternal Grandmother  65    Ovarian cancer " Other Mat Great Aunt 61       Social History     Socioeconomic History    Marital status:    Tobacco Use    Smoking status: Never    Smokeless tobacco: Never   Substance and Sexual Activity    Alcohol use: Yes     Alcohol/week: 5.0 standard drinks of alcohol     Types: 5 Glasses of wine per week     Comment: couple of glasses per week    Drug use: No    Sexual activity: Yes     Partners: Male     Comment:      Social Drivers of Health     Financial Resource Strain: Low Risk  (9/17/2024)    Overall Financial Resource Strain (CARDIA)     Difficulty of Paying Living Expenses: Not hard at all   Food Insecurity: No Food Insecurity (9/17/2024)    Hunger Vital Sign     Worried About Running Out of Food in the Last Year: Never true     Ran Out of Food in the Last Year: Never true   Physical Activity: Insufficiently Active (9/17/2024)    Exercise Vital Sign     Days of Exercise per Week: 1 day     Minutes of Exercise per Session: 20 min   Stress: Stress Concern Present (9/17/2024)    [employer] - Occupational Stress Questionnaire     Feeling of Stress : Very much   Housing Stability: Unknown (9/17/2024)    Housing Stability Vital Sign     Unable to Pay for Housing in the Last Year: No       Current Medications[1]    Review of patient's allergies indicates:   Allergen Reactions    Morphine      Other reaction(s): Itching          Objective   Vitals:    03/03/25 1451   BP: 114/66   Pulse: 98   Temp: 98.1 °F (36.7 °C)       Physical Exam  Constitutional:       General: She is not in acute distress.     Appearance: She is well-developed. She is not diaphoretic.   HENT:      Head: Normocephalic and atraumatic.      Right Ear: External ear normal.      Left Ear: External ear normal.      Nose: Nose normal.      Mouth/Throat:      Pharynx: No oropharyngeal exudate.   Eyes:      General: No scleral icterus.        Right eye: No discharge.         Left eye: No discharge.       Conjunctiva/sclera: Conjunctivae normal.      Pupils: Pupils are equal, round, and reactive to light.   Neck:      Thyroid: No thyromegaly.      Vascular: No JVD.      Trachea: No tracheal deviation.   Cardiovascular:      Rate and Rhythm: Normal rate and regular rhythm.      Heart sounds: Normal heart sounds. No murmur heard.     No friction rub. No gallop.   Pulmonary:      Effort: Pulmonary effort is normal.      Breath sounds: Normal breath sounds. No stridor. No wheezing or rales.   Chest:      Chest wall: No tenderness.   Abdominal:      General: Bowel sounds are normal. There is no distension.      Palpations: Abdomen is soft. There is no mass.      Tenderness: There is no abdominal tenderness. There is no guarding or rebound.      Hernia: No hernia is present.   Musculoskeletal:         General: Tenderness (ttp right 3rd toe with cellulitis) present. Normal range of motion.      Cervical back: Normal range of motion and neck supple.   Lymphadenopathy:      Cervical: No cervical adenopathy.   Skin:     General: Skin is warm and dry.      Coloration: Skin is not pale.      Findings: No erythema or rash.   Neurological:      Mental Status: She is alert and oriented to person, place, and time.      Cranial Nerves: No cranial nerve deficit.      Motor: No abnormal muscle tone.      Coordination: Coordination normal.      Deep Tendon Reflexes: Reflexes are normal and symmetric. Reflexes normal.   Psychiatric:         Behavior: Behavior normal.         Thought Content: Thought content normal.         Judgment: Judgment normal.            Assessment and Plan     1. Injury of toe on right foot, initial encounter  -     X-Ray Foot Complete Right; Future; Expected date: 03/03/2025    2. Cellulitis and abscess of toe, left  -     doxycycline (VIBRAMYCIN) 100 MG Cap; Take 1 capsule (100 mg total) by mouth every 12 (twelve) hours.  Dispense: 20 capsule; Refill: 0        Injury right toe w/cellulitis  -icing  -x ray  -doxy  x 10 days.Side effects of medications have been discussed and patient agreed to proceed with treatment and understands the risks and benefits.  -ortho precautions given    Spent adequate time in obtaining history and explaining differentials    30 minutes spent during this visit of which greater than 50% devoted to face-face counseling and coordination of care regarding diagnosis and management plan           No follow-ups on file.         [1]   Current Outpatient Medications   Medication Sig Dispense Refill    amitriptyline (ELAVIL) 50 MG tablet Take 1-2 tablets by mouth nightly for sleep and migraine prevention 180 tablet 3    aspirin (ECOTRIN) 81 MG EC tablet Take 1 tablet (81 mg total) by mouth once daily. 1 Tablet, Delayed Release (E.C.) Oral Every day 90 tablet 0    butalbital-acetaminophen-caffeine -40 mg (FIORICET, ESGIC) -40 mg per tablet Take 1 tablet by mouth every 4 (four) hours as needed for Pain or Headaches. 60 tablet 3    cetirizine (ZYRTEC) 10 MG tablet Take 1 tablet (10 mg total) by mouth once daily. 90 tablet 3    cholecalciferol, vitamin D3, 125 mcg (5,000 unit) capsule Take 1 capsule (5,000 Units total) by mouth daily with breakfast. 100 capsule 4    cyanocobalamin, vitamin B-12, 1,000 mcg Subl Place 1 lozenge under the tongue 3 (three) times a week. 30 tablet 11    estradioL (ESTRACE) 0.01 % (0.1 mg/gram) vaginal cream Place 1 g vaginally twice a week. Use cream nightly for very 1st 2 weeks 42.5 g 11    fluticasone propionate (FLONASE) 50 mcg/actuation nasal spray 2 sprays (100 mcg total) by Each Nostril route once daily. 16 g 11    hydroCHLOROthiazide (HYDRODIURIL) 25 MG tablet Take 1 tablet (25 mg total) by mouth once daily. 90 tablet 1    lisinopriL (PRINIVIL,ZESTRIL) 20 MG tablet Take 1 tablet (20 mg total) by mouth once daily. 90 tablet 3    magnesium oxide (MAG-OX) 400 mg (241.3 mg magnesium) tablet Take 1 tablet (400 mg total) by mouth once daily. 120 tablet 4    metFORMIN  (GLUCOPHAGE-XR) 500 MG ER 24hr tablet Take 2 tablets (1,000 mg total) by mouth daily with dinner or evening meal. 180 tablet 1    potassium chloride SA (KLOR-CON M20) 20 MEQ tablet Take 1 tablet (20 mEq total) by mouth once daily. 90 tablet 3    simvastatin (ZOCOR) 20 MG tablet Take 1 tablet (20 mg total) by mouth once daily. 90 tablet 4    amoxicillin (AMOXIL) 500 MG capsule Take 1 capsule (500 mg total) by mouth every 6 (six) hours until gone. Start day before surgery. 28 capsule 0    doxycycline (VIBRAMYCIN) 100 MG Cap Take 1 capsule (100 mg total) by mouth every 12 (twelve) hours. 20 capsule 0     No current facility-administered medications for this visit.

## 2025-03-12 ENCOUNTER — LAB VISIT (OUTPATIENT)
Dept: LAB | Facility: HOSPITAL | Age: 65
End: 2025-03-12
Attending: FAMILY MEDICINE
Payer: COMMERCIAL

## 2025-03-12 DIAGNOSIS — E53.8 LOW SERUM VITAMIN B12: ICD-10-CM

## 2025-03-12 DIAGNOSIS — I10 BENIGN ESSENTIAL HYPERTENSION: ICD-10-CM

## 2025-03-12 DIAGNOSIS — N18.31 CHRONIC KIDNEY DISEASE, STAGE 3A: ICD-10-CM

## 2025-03-12 DIAGNOSIS — Z79.899 MEDICATION MANAGEMENT: ICD-10-CM

## 2025-03-12 DIAGNOSIS — E55.9 VITAMIN D INSUFFICIENCY: ICD-10-CM

## 2025-03-12 DIAGNOSIS — R73.03 PREDIABETES: ICD-10-CM

## 2025-03-12 DIAGNOSIS — E78.5 HYPERLIPIDEMIA, UNSPECIFIED HYPERLIPIDEMIA TYPE: ICD-10-CM

## 2025-03-12 LAB
25(OH)D3+25(OH)D2 SERPL-MCNC: 91 NG/ML (ref 30–96)
ALBUMIN SERPL BCP-MCNC: 4.1 G/DL (ref 3.5–5.2)
ALBUMIN/CREAT UR: 4.5 UG/MG (ref 0–30)
ALP SERPL-CCNC: 73 U/L (ref 40–150)
ALT SERPL W/O P-5'-P-CCNC: 20 U/L (ref 10–44)
ANION GAP SERPL CALC-SCNC: 11 MMOL/L (ref 8–16)
AST SERPL-CCNC: 20 U/L (ref 10–40)
BASOPHILS # BLD AUTO: 0.08 K/UL (ref 0–0.2)
BASOPHILS NFR BLD: 1 % (ref 0–1.9)
BILIRUB SERPL-MCNC: 0.5 MG/DL (ref 0.1–1)
BUN SERPL-MCNC: 18 MG/DL (ref 8–23)
CALCIUM SERPL-MCNC: 10 MG/DL (ref 8.7–10.5)
CHLORIDE SERPL-SCNC: 103 MMOL/L (ref 95–110)
CHOLEST SERPL-MCNC: 158 MG/DL (ref 120–199)
CHOLEST/HDLC SERPL: 2.8 {RATIO} (ref 2–5)
CO2 SERPL-SCNC: 27 MMOL/L (ref 23–29)
CREAT SERPL-MCNC: 1 MG/DL (ref 0.5–1.4)
CREAT UR-MCNC: 198 MG/DL (ref 15–325)
DIFFERENTIAL METHOD BLD: NORMAL
EOSINOPHIL # BLD AUTO: 0.2 K/UL (ref 0–0.5)
EOSINOPHIL NFR BLD: 3 % (ref 0–8)
ERYTHROCYTE [DISTWIDTH] IN BLOOD BY AUTOMATED COUNT: 12.9 % (ref 11.5–14.5)
EST. GFR  (NO RACE VARIABLE): >60 ML/MIN/1.73 M^2
ESTIMATED AVG GLUCOSE: 123 MG/DL (ref 68–131)
GLUCOSE SERPL-MCNC: 101 MG/DL (ref 70–110)
HBA1C MFR BLD: 5.9 % (ref 4–5.6)
HCT VFR BLD AUTO: 38.7 % (ref 37–48.5)
HDLC SERPL-MCNC: 57 MG/DL (ref 40–75)
HDLC SERPL: 36.1 % (ref 20–50)
HGB BLD-MCNC: 12.7 G/DL (ref 12–16)
IMM GRANULOCYTES # BLD AUTO: 0.02 K/UL (ref 0–0.04)
IMM GRANULOCYTES NFR BLD AUTO: 0.3 % (ref 0–0.5)
LDLC SERPL CALC-MCNC: 75 MG/DL (ref 63–159)
LYMPHOCYTES # BLD AUTO: 3 K/UL (ref 1–4.8)
LYMPHOCYTES NFR BLD: 39.5 % (ref 18–48)
MAGNESIUM SERPL-MCNC: 1.8 MG/DL (ref 1.6–2.6)
MCH RBC QN AUTO: 29.6 PG (ref 27–31)
MCHC RBC AUTO-ENTMCNC: 32.8 G/DL (ref 32–36)
MCV RBC AUTO: 90 FL (ref 82–98)
MICROALBUMIN UR DL<=1MG/L-MCNC: 9 UG/ML
MONOCYTES # BLD AUTO: 0.4 K/UL (ref 0.3–1)
MONOCYTES NFR BLD: 4.7 % (ref 4–15)
NEUTROPHILS # BLD AUTO: 3.9 K/UL (ref 1.8–7.7)
NEUTROPHILS NFR BLD: 51.5 % (ref 38–73)
NONHDLC SERPL-MCNC: 101 MG/DL
NRBC BLD-RTO: 0 /100 WBC
PLATELET # BLD AUTO: 342 K/UL (ref 150–450)
PMV BLD AUTO: 9.6 FL (ref 9.2–12.9)
POTASSIUM SERPL-SCNC: 4 MMOL/L (ref 3.5–5.1)
PROT SERPL-MCNC: 7.8 G/DL (ref 6–8.4)
RBC # BLD AUTO: 4.29 M/UL (ref 4–5.4)
SODIUM SERPL-SCNC: 141 MMOL/L (ref 136–145)
TRIGL SERPL-MCNC: 130 MG/DL (ref 30–150)
TSH SERPL DL<=0.005 MIU/L-ACNC: 2.25 UIU/ML (ref 0.4–4)
VIT B12 SERPL-MCNC: 858 PG/ML (ref 210–950)
WBC # BLD AUTO: 7.62 K/UL (ref 3.9–12.7)

## 2025-03-12 PROCEDURE — 82607 VITAMIN B-12: CPT | Performed by: FAMILY MEDICINE

## 2025-03-12 PROCEDURE — 36415 COLL VENOUS BLD VENIPUNCTURE: CPT | Performed by: FAMILY MEDICINE

## 2025-03-12 PROCEDURE — 82306 VITAMIN D 25 HYDROXY: CPT | Performed by: FAMILY MEDICINE

## 2025-03-12 PROCEDURE — 80053 COMPREHEN METABOLIC PANEL: CPT | Performed by: FAMILY MEDICINE

## 2025-03-12 PROCEDURE — 82043 UR ALBUMIN QUANTITATIVE: CPT | Performed by: FAMILY MEDICINE

## 2025-03-12 PROCEDURE — 85025 COMPLETE CBC W/AUTO DIFF WBC: CPT | Performed by: FAMILY MEDICINE

## 2025-03-12 PROCEDURE — 83036 HEMOGLOBIN GLYCOSYLATED A1C: CPT | Performed by: FAMILY MEDICINE

## 2025-03-12 PROCEDURE — 80061 LIPID PANEL: CPT | Performed by: FAMILY MEDICINE

## 2025-03-12 PROCEDURE — 83735 ASSAY OF MAGNESIUM: CPT | Performed by: FAMILY MEDICINE

## 2025-03-12 PROCEDURE — 84443 ASSAY THYROID STIM HORMONE: CPT | Performed by: FAMILY MEDICINE

## 2025-03-13 ENCOUNTER — RESULTS FOLLOW-UP (OUTPATIENT)
Dept: INTERNAL MEDICINE | Facility: CLINIC | Age: 65
End: 2025-03-13

## 2025-04-02 ENCOUNTER — OFFICE VISIT (OUTPATIENT)
Dept: FAMILY MEDICINE | Facility: CLINIC | Age: 65
End: 2025-04-02
Payer: COMMERCIAL

## 2025-04-02 DIAGNOSIS — N18.31 CHRONIC KIDNEY DISEASE, STAGE 3A: ICD-10-CM

## 2025-04-02 DIAGNOSIS — B96.89 ACUTE BACTERIAL SINUSITIS: Primary | ICD-10-CM

## 2025-04-02 DIAGNOSIS — J01.90 ACUTE BACTERIAL SINUSITIS: Primary | ICD-10-CM

## 2025-04-02 RX ORDER — AMOXICILLIN AND CLAVULANATE POTASSIUM 875; 125 MG/1; MG/1
1 TABLET, FILM COATED ORAL EVERY 12 HOURS
Qty: 20 TABLET | Refills: 0 | Status: SHIPPED | OUTPATIENT
Start: 2025-04-02 | End: 2025-04-12

## 2025-04-02 NOTE — PROGRESS NOTES
"Subjective     Patient ID: Esther Adams is a 64 y.o. female.    Chief Complaint: Sinus Problem    Patient is a 64 year old white female with HTN, HLD, allergies, migraines that presents via virtual visit today for acute c/o sinus problem. Reports worsening sinus congestion for over a week. Endorses facial pain, headaches, runny nose, congestion, cough, sore throat. Denies fever but states she feels warm. Denies any chest pain or SOB. Takes zyrtec and Flonase. Has history of sinus infections.       Review of Systems   Constitutional:  Negative for activity change, fever and unexpected weight change.   HENT:  Positive for nasal congestion, ear pain, rhinorrhea, sinus pressure/congestion and sore throat. Negative for hearing loss and trouble swallowing.    Eyes:  Negative for discharge and visual disturbance.   Respiratory:  Positive for cough. Negative for chest tightness, shortness of breath and wheezing.    Cardiovascular:  Negative for chest pain and palpitations.   Gastrointestinal:  Negative for blood in stool, constipation, diarrhea and vomiting.   Endocrine: Negative for polydipsia and polyuria.   Genitourinary:  Negative for difficulty urinating, dysuria, hematuria and menstrual problem.   Musculoskeletal:  Negative for arthralgias, joint swelling and neck pain.   Integumentary:  Negative for rash.   Neurological:  Positive for headaches. Negative for weakness.   Psychiatric/Behavioral:  Negative for confusion and dysphoric mood.      Past Medical History:   Diagnosis Date    Allergic rhinitis 9/7/2016    trial of daily allegra or any antihistamine- avoid "D" products due to high blood pressure    Benign essential hypertension 12/14/2012    Family history of stroke 9/7/2016    Hyperlipidemia 9/7/2016    Migraine syndrome     Obesity (BMI 30.0-34.9) 9/7/2016    Positive hepatitis C antibody test 9/23/2017    Vitamin D insufficiency 9/23/2017       Past Surgical History:   Procedure Laterality Date    " BILATERAL OOPHORECTOMY      BREAST BIOPSY Left      SECTION      COLONOSCOPY N/A 2018    Procedure: COLONOSCOPY Golytely;  Surgeon: Jo-Ann Simon MD;  Location: [facility] ENDO;  Service: Endoscopy;  Laterality: N/A;    COLONOSCOPY N/A 2023    Procedure: COLONOSCOPY;  Surgeon: Jo-Ann Simon MD;  Location: [facility] ENDO;  Service: Endoscopy;  Laterality: N/A;    HYSTERECTOMY      endometriosis    OOPHORECTOMY         Family History   Problem Relation Name Age of Onset    Breast cancer Mother Liyah Thorpe 60    Heart disease Mother Liyah Thorpe     Stroke Mother Liyah Thorpe     Hypertension Mother Liyah Thorpe     Glaucoma Mother Liyah Thorpe     Thyroid disease Mother Liyah Thorpe     Osteoporosis Mother Liyah Thorpe     Cancer Mother Liyah Thorpe     Stroke Father Laith Maco     Schizophrenia Father Laith Maco     Pancreatitis Father Laith Maco         viral    Migraines Father Laith Maco     Migraines Sister      Breast cancer Sister  60    Ovarian cancer Maternal Grandmother  65    Ovarian cancer Other Mat Great Aunt 61       Social History     Socioeconomic History    Marital status:    Tobacco Use    Smoking status: Never    Smokeless tobacco: Never   Substance and Sexual Activity    Alcohol use: Yes     Alcohol/week: 5.0 standard drinks of alcohol     Types: 5 Glasses of wine per week     Comment: couple of glasses per week    Drug use: No    Sexual activity: Yes     Partners: Male     Comment:      Social Drivers of Health     Financial Resource Strain: Low Risk  (2024)    Overall Financial Resource Strain (CARDIA)     Difficulty of Paying Living Expenses: Not hard at all   Food Insecurity: No Food Insecurity (2024)    Hunger Vital Sign     Worried About Running Out of Food in the Last Year: Never true     Ran Out of Food in the Last Year: Never true   Physical Activity: Insufficiently Active  (9/17/2024)    Exercise Vital Sign     Days of Exercise per Week: 1 day     Minutes of Exercise per Session: 20 min   Stress: Stress Concern Present (9/17/2024)    [employer] - Occupational Stress Questionnaire     Feeling of Stress : Very much   Housing Stability: Unknown (9/17/2024)    Housing Stability Vital Sign     Unable to Pay for Housing in the Last Year: No       Current Medications[1]    Review of patient's allergies indicates:   Allergen Reactions    Morphine      Other reaction(s): Itching         Objective     There were no vitals filed for this visit.   Physical Exam  Nursing note reviewed. Vitals reviewed: no VS available.  Constitutional:       General: She is not in acute distress.     Appearance: Normal appearance. She is not ill-appearing.   HENT:      Head: Normocephalic and atraumatic.      Nose: Congestion present.   Eyes:      General: No scleral icterus.        Right eye: No discharge.         Left eye: No discharge.      Extraocular Movements: Extraocular movements intact.      Conjunctiva/sclera: Conjunctivae normal.   Pulmonary:      Effort: Pulmonary effort is normal. No respiratory distress.   Musculoskeletal:      Cervical back: Normal range of motion.   Neurological:      Mental Status: She is alert and oriented to person, place, and time.      GCS: GCS eye subscore is 4. GCS verbal subscore is 5. GCS motor subscore is 6.   Psychiatric:         Mood and Affect: Mood normal.         Speech: Speech normal.         Behavior: Behavior normal.         Cognition and Memory: Cognition normal.       Physical exam limited due to virtual visit.         Assessment and Plan     1. Acute bacterial sinusitis  - New problem; sinus congestion for over a week that has progressively worsened; +facial pain and pressure, +headaches, +congestion, +rhinorrhea. Denies fever. Take oral antibiotics as prescribed. Take tylenol or ibuprofen for pain/fever. Continue zyrtec and  Flonase. RTC for in person follow up in 1 week if s/s worsen or fail to improve. Stay hydrated.   -     amoxicillin-clavulanate 875-125mg (AUGMENTIN) 875-125 mg per tablet; Take 1 tablet by mouth every 12 (twelve) hours. for 10 days  Dispense: 20 tablet; Refill: 0    2. Chronic kidney disease, stage 3a   Chronic; stable- monitoring; follow up with PCP.  GFR was >60 on 3/12/25.      ER/Urgent Care precautions discussed with patient. Go to ER for new or worsening symptoms.           Follow up in about 1 week (around 4/9/2025), or if symptoms worsen or fail to improve.    The patient location is: [place]- at work   The chief complaint leading to consultation is: sinuses  Total time spent with patient: 10 minutes  Visit type: Virtual visit with synchronous audio and video  Each patient to whom he or she provides medical services by telemedicine is: (1) informed of the relationship between the physician and patient and the respective role of any other health care provider with respect to management of the patient; and (2) notified that he or she may decline to receive medical services by telemedicine and may withdraw from such care at any time.       Mary Carmen Cam, MSN, APRN, FNP-C  Family Medicine  Office #[phone]          [1]   Current Outpatient Medications   Medication Sig Dispense Refill    amitriptyline (ELAVIL) 50 MG tablet Take 1-2 tablets by mouth nightly for sleep and migraine prevention 180 tablet 3    amoxicillin-clavulanate 875-125mg (AUGMENTIN) 875-125 mg per tablet Take 1 tablet by mouth every 12 (twelve) hours. for 10 days 20 tablet 0    aspirin (ECOTRIN) 81 MG EC tablet Take 1 tablet (81 mg total) by mouth once daily. 1 Tablet, Delayed Release (E.C.) Oral Every day 90 tablet 0    butalbital-acetaminophen-caffeine -40 mg (FIORICET, ESGIC) -40 mg per tablet Take 1 tablet by mouth every 4 (four) hours as needed for Pain or Headaches. 60 tablet 3    cetirizine (ZYRTEC) 10 MG tablet Take 1  tablet (10 mg total) by mouth once daily. 90 tablet 3    cholecalciferol, vitamin D3, 125 mcg (5,000 unit) capsule Take 1 capsule (5,000 Units total) by mouth daily with breakfast. 100 capsule 4    cyanocobalamin, vitamin B-12, 1,000 mcg Subl Place 1 lozenge under the tongue 3 (three) times a week. 30 tablet 11    doxycycline (VIBRAMYCIN) 100 MG Cap Take 1 capsule (100 mg total) by mouth every 12 (twelve) hours. 20 capsule 0    estradioL (ESTRACE) 0.01 % (0.1 mg/gram) vaginal cream Place 1 g vaginally twice a week. Use cream nightly for very 1st 2 weeks 42.5 g 11    fluticasone propionate (FLONASE) 50 mcg/actuation nasal spray 2 sprays (100 mcg total) by Each Nostril route once daily. 16 g 11    hydroCHLOROthiazide (HYDRODIURIL) 25 MG tablet Take 1 tablet (25 mg total) by mouth once daily. 90 tablet 1    lisinopriL (PRINIVIL,ZESTRIL) 20 MG tablet Take 1 tablet (20 mg total) by mouth once daily. 90 tablet 3    magnesium oxide (MAG-OX) 400 mg (241.3 mg magnesium) tablet Take 1 tablet (400 mg total) by mouth once daily. 120 tablet 4    metFORMIN (GLUCOPHAGE-XR) 500 MG ER 24hr tablet Take 2 tablets (1,000 mg total) by mouth daily with dinner or evening meal. 180 tablet 1    potassium chloride SA (KLOR-CON M20) 20 MEQ tablet Take 1 tablet (20 mEq total) by mouth once daily. 90 tablet 3    simvastatin (ZOCOR) 20 MG tablet Take 1 tablet (20 mg total) by mouth once daily. 90 tablet 4     No current facility-administered medications for this visit.

## 2025-04-06 DIAGNOSIS — E53.8 LOW SERUM VITAMIN B12: ICD-10-CM

## 2025-04-07 RX ORDER — MAGNESIUM 200 MG
1 TABLET ORAL
Qty: 30 TABLET | Refills: 11 | Status: SHIPPED | OUTPATIENT
Start: 2025-04-07

## 2025-05-12 DIAGNOSIS — E78.5 HYPERLIPIDEMIA, UNSPECIFIED HYPERLIPIDEMIA TYPE: ICD-10-CM

## 2025-05-12 DIAGNOSIS — I10 BENIGN ESSENTIAL HYPERTENSION: ICD-10-CM

## 2025-05-12 NOTE — TELEPHONE ENCOUNTER
No care due was identified.  [facility] Embedded Care Due Messages. Reference number: [id number].   5/12/2025 5:43:31 PM CDT

## 2025-05-13 RX ORDER — HYDROCHLOROTHIAZIDE 25 MG/1
25 TABLET ORAL DAILY
Qty: 90 TABLET | Refills: 2 | Status: SHIPPED | OUTPATIENT
Start: 2025-05-13

## 2025-05-13 RX ORDER — SIMVASTATIN 20 MG/1
20 TABLET, FILM COATED ORAL DAILY
Qty: 90 TABLET | Refills: 2 | Status: SHIPPED | OUTPATIENT
Start: 2025-05-13

## 2025-05-13 NOTE — TELEPHONE ENCOUNTER
Refill Decision Note   Esther Adams  is requesting a refill authorization.  Brief Assessment and Rationale for Refill:  Approve     Medication Therapy Plan:         Comments:     Note composed:5:09 AM 05/13/2025

## 2025-05-24 DIAGNOSIS — R73.03 PREDIABETES: ICD-10-CM

## 2025-05-24 NOTE — TELEPHONE ENCOUNTER
No care due was identified.  [facility] Embedded Care Due Messages. Reference number: [id number].   5/24/2025 11:33:24 AM CDT   (0) independent

## 2025-05-25 RX ORDER — METFORMIN HYDROCHLORIDE 500 MG/1
1000 TABLET, EXTENDED RELEASE ORAL
Qty: 180 TABLET | Refills: 1 | Status: SHIPPED | OUTPATIENT
Start: 2025-05-25

## 2025-05-26 NOTE — TELEPHONE ENCOUNTER
Refill Decision Note   Esther Adams  is requesting a refill authorization.  Brief Assessment and Rationale for Refill:  Approve     Medication Therapy Plan:         Comments:     Note composed:9:02 PM 05/25/2025

## 2025-06-04 ENCOUNTER — OFFICE VISIT (OUTPATIENT)
Dept: INTERNAL MEDICINE | Facility: CLINIC | Age: 65
End: 2025-06-04
Payer: COMMERCIAL

## 2025-06-04 VITALS
WEIGHT: 176.56 LBS | SYSTOLIC BLOOD PRESSURE: 116 MMHG | BODY MASS INDEX: 32.49 KG/M2 | OXYGEN SATURATION: 98 % | TEMPERATURE: 98 F | DIASTOLIC BLOOD PRESSURE: 72 MMHG | HEART RATE: 101 BPM | HEIGHT: 62 IN

## 2025-06-04 DIAGNOSIS — Z00.00 ROUTINE GENERAL MEDICAL EXAMINATION AT A HEALTH CARE FACILITY: Primary | ICD-10-CM

## 2025-06-04 DIAGNOSIS — Z23 NEED FOR SHINGLES VACCINE: ICD-10-CM

## 2025-06-04 DIAGNOSIS — G43.909 MIGRAINE SYNDROME: ICD-10-CM

## 2025-06-04 DIAGNOSIS — E87.6 DRUG-INDUCED HYPOKALEMIA: ICD-10-CM

## 2025-06-04 DIAGNOSIS — E66.09 CLASS 1 OBESITY DUE TO EXCESS CALORIES WITH SERIOUS COMORBIDITY AND BODY MASS INDEX (BMI) OF 32.0 TO 32.9 IN ADULT: ICD-10-CM

## 2025-06-04 DIAGNOSIS — E78.5 HYPERLIPIDEMIA, UNSPECIFIED HYPERLIPIDEMIA TYPE: ICD-10-CM

## 2025-06-04 DIAGNOSIS — E55.9 VITAMIN D INSUFFICIENCY: ICD-10-CM

## 2025-06-04 DIAGNOSIS — Z01.00 ROUTINE EYE EXAM: ICD-10-CM

## 2025-06-04 DIAGNOSIS — Z23 NEED FOR VACCINATION WITH 20-POLYVALENT PNEUMOCOCCAL CONJUGATE VACCINE: ICD-10-CM

## 2025-06-04 DIAGNOSIS — R73.03 PREDIABETES: ICD-10-CM

## 2025-06-04 DIAGNOSIS — Z79.899 MEDICATION MANAGEMENT: ICD-10-CM

## 2025-06-04 DIAGNOSIS — E53.8 LOW SERUM VITAMIN B12: ICD-10-CM

## 2025-06-04 DIAGNOSIS — T50.905A DRUG-INDUCED HYPOKALEMIA: ICD-10-CM

## 2025-06-04 DIAGNOSIS — E66.811 CLASS 1 OBESITY DUE TO EXCESS CALORIES WITH SERIOUS COMORBIDITY AND BODY MASS INDEX (BMI) OF 32.0 TO 32.9 IN ADULT: ICD-10-CM

## 2025-06-04 DIAGNOSIS — I10 BENIGN ESSENTIAL HYPERTENSION: ICD-10-CM

## 2025-06-04 PROBLEM — N18.31 CHRONIC KIDNEY DISEASE, STAGE 3A: Status: RESOLVED | Noted: 2024-03-10 | Resolved: 2025-06-04

## 2025-06-04 PROCEDURE — 4010F ACE/ARB THERAPY RXD/TAKEN: CPT | Mod: CPTII,S$GLB,, | Performed by: FAMILY MEDICINE

## 2025-06-04 PROCEDURE — 1101F PT FALLS ASSESS-DOCD LE1/YR: CPT | Mod: CPTII,S$GLB,, | Performed by: FAMILY MEDICINE

## 2025-06-04 PROCEDURE — 3044F HG A1C LEVEL LT 7.0%: CPT | Mod: CPTII,S$GLB,, | Performed by: FAMILY MEDICINE

## 2025-06-04 PROCEDURE — 3066F NEPHROPATHY DOC TX: CPT | Mod: CPTII,S$GLB,, | Performed by: FAMILY MEDICINE

## 2025-06-04 PROCEDURE — 90471 IMMUNIZATION ADMIN: CPT | Mod: S$GLB,,, | Performed by: FAMILY MEDICINE

## 2025-06-04 PROCEDURE — 3288F FALL RISK ASSESSMENT DOCD: CPT | Mod: CPTII,S$GLB,, | Performed by: FAMILY MEDICINE

## 2025-06-04 PROCEDURE — 3074F SYST BP LT 130 MM HG: CPT | Mod: CPTII,S$GLB,, | Performed by: FAMILY MEDICINE

## 2025-06-04 PROCEDURE — 90677 PCV20 VACCINE IM: CPT | Mod: S$GLB,,, | Performed by: FAMILY MEDICINE

## 2025-06-04 PROCEDURE — 1159F MED LIST DOCD IN RCRD: CPT | Mod: CPTII,S$GLB,, | Performed by: FAMILY MEDICINE

## 2025-06-04 PROCEDURE — 3078F DIAST BP <80 MM HG: CPT | Mod: CPTII,S$GLB,, | Performed by: FAMILY MEDICINE

## 2025-06-04 PROCEDURE — 99999 PR PBB SHADOW E&M-EST. PATIENT-LVL IV: CPT | Mod: PBBFAC,,, | Performed by: FAMILY MEDICINE

## 2025-06-04 PROCEDURE — 1126F AMNT PAIN NOTED NONE PRSNT: CPT | Mod: CPTII,S$GLB,, | Performed by: FAMILY MEDICINE

## 2025-06-04 PROCEDURE — 1160F RVW MEDS BY RX/DR IN RCRD: CPT | Mod: CPTII,S$GLB,, | Performed by: FAMILY MEDICINE

## 2025-06-04 PROCEDURE — 3061F NEG MICROALBUMINURIA REV: CPT | Mod: CPTII,S$GLB,, | Performed by: FAMILY MEDICINE

## 2025-06-04 PROCEDURE — 3008F BODY MASS INDEX DOCD: CPT | Mod: CPTII,S$GLB,, | Performed by: FAMILY MEDICINE

## 2025-06-04 PROCEDURE — 99397 PER PM REEVAL EST PAT 65+ YR: CPT | Mod: 25,S$GLB,, | Performed by: FAMILY MEDICINE

## 2025-06-04 RX ORDER — AMITRIPTYLINE HYDROCHLORIDE 50 MG/1
TABLET, FILM COATED ORAL
Qty: 180 TABLET | Refills: 3 | Status: SHIPPED | OUTPATIENT
Start: 2025-06-04

## 2025-06-04 RX ORDER — POTASSIUM CHLORIDE 20 MEQ/1
20 TABLET, EXTENDED RELEASE ORAL DAILY
Qty: 90 TABLET | Refills: 3 | Status: SHIPPED | OUTPATIENT
Start: 2025-06-04

## 2025-06-04 RX ORDER — LISINOPRIL 20 MG/1
20 TABLET ORAL DAILY
Qty: 90 TABLET | Refills: 3 | Status: SHIPPED | OUTPATIENT
Start: 2025-06-04

## 2025-07-30 DIAGNOSIS — R09.82 ALLERGIC RHINITIS WITH POSTNASAL DRIP: ICD-10-CM

## 2025-07-30 DIAGNOSIS — J30.9 ALLERGIC RHINITIS WITH POSTNASAL DRIP: ICD-10-CM

## 2025-07-30 RX ORDER — CETIRIZINE HYDROCHLORIDE 10 MG/1
10 TABLET ORAL DAILY
Qty: 90 TABLET | Refills: 3 | Status: SHIPPED | OUTPATIENT
Start: 2025-07-30

## 2025-07-30 NOTE — TELEPHONE ENCOUNTER
Refill Decision Note   Esther Adams  is requesting a refill authorization.  Brief Assessment and Rationale for Refill:  Approve     Medication Therapy Plan:         Comments:     Note composed:1:25 PM 07/30/2025

## 2025-07-30 NOTE — TELEPHONE ENCOUNTER
Care Due:                  Date            Visit Type   Department     Provider  --------------------------------------------------------------------------------                                EP -                              PRIMARY      [facility] INTERNAL  Last Visit: 06-      CARE ([facility])   MEDICINE       Shanta Perez                              ESTABLISHED                              PATIENT -    [facility] INTERNAL  Next Visit: 09-      [facility]      MEDICINE       Shanta Perez                                                            Last  Test          Frequency    Reason                     Performed    Due Date  --------------------------------------------------------------------------------    HBA1C.......  6 months...  metFORMIN................  03- 09-    [facility] Embedded Care Due Messages. Reference number: [id number].   7/30/2025 8:32:34 AM CDT

## 2025-08-27 ENCOUNTER — LAB VISIT (OUTPATIENT)
Dept: LAB | Facility: HOSPITAL | Age: 65
End: 2025-08-27
Attending: FAMILY MEDICINE
Payer: COMMERCIAL

## 2025-08-27 DIAGNOSIS — E78.5 HYPERLIPIDEMIA, UNSPECIFIED HYPERLIPIDEMIA TYPE: ICD-10-CM

## 2025-08-27 DIAGNOSIS — R73.03 PREDIABETES: ICD-10-CM

## 2025-08-27 LAB
ALBUMIN SERPL BCP-MCNC: 4.1 G/DL (ref 3.5–5.2)
ALP SERPL-CCNC: 76 UNIT/L (ref 40–150)
ALT SERPL W/O P-5'-P-CCNC: 16 UNIT/L (ref 10–44)
ANION GAP (OHS): 10 MMOL/L (ref 8–16)
AST SERPL-CCNC: 24 UNIT/L (ref 11–45)
BILIRUB SERPL-MCNC: 0.3 MG/DL (ref 0.1–1)
BUN SERPL-MCNC: 15 MG/DL (ref 8–23)
CALCIUM SERPL-MCNC: 9.7 MG/DL (ref 8.7–10.5)
CHLORIDE SERPL-SCNC: 104 MMOL/L (ref 95–110)
CO2 SERPL-SCNC: 28 MMOL/L (ref 23–29)
CREAT SERPL-MCNC: 1.1 MG/DL (ref 0.5–1.4)
EAG (OHS): 123 MG/DL (ref 68–131)
GFR SERPLBLD CREATININE-BSD FMLA CKD-EPI: 56 ML/MIN/1.73/M2
GLUCOSE SERPL-MCNC: 122 MG/DL (ref 70–110)
HBA1C MFR BLD: 5.9 % (ref 4–5.6)
POTASSIUM SERPL-SCNC: 4.6 MMOL/L (ref 3.5–5.1)
PROT SERPL-MCNC: 7.4 GM/DL (ref 6–8.4)
SODIUM SERPL-SCNC: 142 MMOL/L (ref 136–145)

## 2025-08-27 PROCEDURE — 83036 HEMOGLOBIN GLYCOSYLATED A1C: CPT

## 2025-08-27 PROCEDURE — 36415 COLL VENOUS BLD VENIPUNCTURE: CPT

## 2025-08-27 PROCEDURE — 80053 COMPREHEN METABOLIC PANEL: CPT

## 2025-09-05 ENCOUNTER — TELEPHONE (OUTPATIENT)
Dept: INTERNAL MEDICINE | Facility: CLINIC | Age: 65
End: 2025-09-05

## 2025-09-05 ENCOUNTER — OFFICE VISIT (OUTPATIENT)
Dept: INTERNAL MEDICINE | Facility: CLINIC | Age: 65
End: 2025-09-05
Payer: COMMERCIAL

## 2025-09-05 DIAGNOSIS — Z12.31 SCREENING MAMMOGRAM FOR BREAST CANCER: ICD-10-CM

## 2025-09-05 DIAGNOSIS — R79.9 ABNORMAL BLOOD CHEMISTRY: ICD-10-CM

## 2025-09-05 DIAGNOSIS — E66.09 CLASS 1 OBESITY DUE TO EXCESS CALORIES WITH SERIOUS COMORBIDITY IN ADULT, UNSPECIFIED BMI: ICD-10-CM

## 2025-09-05 DIAGNOSIS — N28.9 RENAL INSUFFICIENCY: ICD-10-CM

## 2025-09-05 DIAGNOSIS — E78.2 MIXED HYPERLIPIDEMIA: ICD-10-CM

## 2025-09-05 DIAGNOSIS — G47.19 EXCESSIVE DAYTIME SLEEPINESS: ICD-10-CM

## 2025-09-05 DIAGNOSIS — I10 BENIGN ESSENTIAL HYPERTENSION: ICD-10-CM

## 2025-09-05 DIAGNOSIS — E53.8 LOW SERUM VITAMIN B12: ICD-10-CM

## 2025-09-05 DIAGNOSIS — E66.811 CLASS 1 OBESITY DUE TO EXCESS CALORIES WITH SERIOUS COMORBIDITY IN ADULT, UNSPECIFIED BMI: ICD-10-CM

## 2025-09-05 DIAGNOSIS — E55.9 VITAMIN D INSUFFICIENCY: ICD-10-CM

## 2025-09-05 DIAGNOSIS — R73.03 PREDIABETES: Primary | ICD-10-CM
